# Patient Record
Sex: FEMALE | Race: WHITE | URBAN - METROPOLITAN AREA
[De-identification: names, ages, dates, MRNs, and addresses within clinical notes are randomized per-mention and may not be internally consistent; named-entity substitution may affect disease eponyms.]

---

## 2018-01-17 ENCOUNTER — OUTPATIENT (OUTPATIENT)
Dept: OUTPATIENT SERVICES | Facility: HOSPITAL | Age: 58
LOS: 1 days | Discharge: HOME | End: 2018-01-17

## 2018-01-17 DIAGNOSIS — Z12.31 ENCOUNTER FOR SCREENING MAMMOGRAM FOR MALIGNANT NEOPLASM OF BREAST: ICD-10-CM

## 2018-01-23 ENCOUNTER — OUTPATIENT (OUTPATIENT)
Dept: OUTPATIENT SERVICES | Facility: HOSPITAL | Age: 58
LOS: 1 days | Discharge: HOME | End: 2018-01-23

## 2018-01-23 DIAGNOSIS — R92.8 OTHER ABNORMAL AND INCONCLUSIVE FINDINGS ON DIAGNOSTIC IMAGING OF BREAST: ICD-10-CM

## 2019-01-22 ENCOUNTER — OUTPATIENT (OUTPATIENT)
Dept: OUTPATIENT SERVICES | Facility: HOSPITAL | Age: 59
LOS: 1 days | Discharge: HOME | End: 2019-01-22

## 2019-01-22 DIAGNOSIS — Z12.31 ENCOUNTER FOR SCREENING MAMMOGRAM FOR MALIGNANT NEOPLASM OF BREAST: ICD-10-CM

## 2019-01-23 DIAGNOSIS — Z13.820 ENCOUNTER FOR SCREENING FOR OSTEOPOROSIS: ICD-10-CM

## 2019-01-23 DIAGNOSIS — Z78.0 ASYMPTOMATIC MENOPAUSAL STATE: ICD-10-CM

## 2019-01-23 DIAGNOSIS — K51.90 ULCERATIVE COLITIS, UNSPECIFIED, WITHOUT COMPLICATIONS: ICD-10-CM

## 2019-01-23 DIAGNOSIS — M89.9 DISORDER OF BONE, UNSPECIFIED: ICD-10-CM

## 2020-01-24 ENCOUNTER — OUTPATIENT (OUTPATIENT)
Dept: OUTPATIENT SERVICES | Facility: HOSPITAL | Age: 60
LOS: 1 days | Discharge: HOME | End: 2020-01-24
Payer: MEDICARE

## 2020-01-24 DIAGNOSIS — Z12.31 ENCOUNTER FOR SCREENING MAMMOGRAM FOR MALIGNANT NEOPLASM OF BREAST: ICD-10-CM

## 2020-01-24 PROCEDURE — 77063 BREAST TOMOSYNTHESIS BI: CPT | Mod: 26

## 2020-01-24 PROCEDURE — 77067 SCR MAMMO BI INCL CAD: CPT | Mod: 26

## 2020-02-13 ENCOUNTER — OUTPATIENT (OUTPATIENT)
Dept: OUTPATIENT SERVICES | Facility: HOSPITAL | Age: 60
LOS: 1 days | Discharge: HOME | End: 2020-02-13
Payer: MEDICARE

## 2020-02-13 DIAGNOSIS — R92.8 OTHER ABNORMAL AND INCONCLUSIVE FINDINGS ON DIAGNOSTIC IMAGING OF BREAST: ICD-10-CM

## 2020-02-13 PROCEDURE — G0279: CPT | Mod: 26,RT

## 2020-02-13 PROCEDURE — 77065 DX MAMMO INCL CAD UNI: CPT | Mod: 26,RT

## 2020-02-13 PROCEDURE — 76642 ULTRASOUND BREAST LIMITED: CPT | Mod: 26,RT

## 2020-03-06 ENCOUNTER — OUTPATIENT (OUTPATIENT)
Dept: OUTPATIENT SERVICES | Facility: HOSPITAL | Age: 60
LOS: 1 days | Discharge: HOME | End: 2020-03-06
Payer: MEDICARE

## 2020-03-06 DIAGNOSIS — R92.8 OTHER ABNORMAL AND INCONCLUSIVE FINDINGS ON DIAGNOSTIC IMAGING OF BREAST: ICD-10-CM

## 2020-03-06 PROCEDURE — 76642 ULTRASOUND BREAST LIMITED: CPT | Mod: 26,RT

## 2020-04-20 PROBLEM — Z00.00 ENCOUNTER FOR PREVENTIVE HEALTH EXAMINATION: Status: ACTIVE | Noted: 2020-04-20

## 2020-09-18 ENCOUNTER — OUTPATIENT (OUTPATIENT)
Dept: OUTPATIENT SERVICES | Facility: HOSPITAL | Age: 60
LOS: 1 days | Discharge: HOME | End: 2020-09-18
Payer: MEDICARE

## 2020-09-18 DIAGNOSIS — R92.8 OTHER ABNORMAL AND INCONCLUSIVE FINDINGS ON DIAGNOSTIC IMAGING OF BREAST: ICD-10-CM

## 2020-09-18 PROCEDURE — 76642 ULTRASOUND BREAST LIMITED: CPT | Mod: 26,RT

## 2020-09-18 PROCEDURE — G0279: CPT | Mod: 26

## 2020-09-18 PROCEDURE — 77065 DX MAMMO INCL CAD UNI: CPT | Mod: 26,RT

## 2020-10-01 ENCOUNTER — RESULT REVIEW (OUTPATIENT)
Age: 60
End: 2020-10-01

## 2020-10-01 ENCOUNTER — OUTPATIENT (OUTPATIENT)
Dept: OUTPATIENT SERVICES | Facility: HOSPITAL | Age: 60
LOS: 1 days | Discharge: HOME | End: 2020-10-01
Payer: MEDICARE

## 2020-10-01 PROCEDURE — 19081 BX BREAST 1ST LESION STRTCTC: CPT | Mod: RT

## 2020-10-01 PROCEDURE — 88305 TISSUE EXAM BY PATHOLOGIST: CPT | Mod: 26

## 2020-10-02 LAB — SURGICAL PATHOLOGY STUDY: SIGNIFICANT CHANGE UP

## 2020-10-07 DIAGNOSIS — D24.1 BENIGN NEOPLASM OF RIGHT BREAST: ICD-10-CM

## 2020-10-07 DIAGNOSIS — R92.1 MAMMOGRAPHIC CALCIFICATION FOUND ON DIAGNOSTIC IMAGING OF BREAST: ICD-10-CM

## 2020-11-16 ENCOUNTER — APPOINTMENT (OUTPATIENT)
Dept: BREAST CENTER | Facility: CLINIC | Age: 60
End: 2020-11-16
Payer: MEDICARE

## 2020-11-16 VITALS
DIASTOLIC BLOOD PRESSURE: 82 MMHG | SYSTOLIC BLOOD PRESSURE: 124 MMHG | HEIGHT: 60 IN | WEIGHT: 162 LBS | TEMPERATURE: 97.3 F | BODY MASS INDEX: 31.8 KG/M2

## 2020-11-16 DIAGNOSIS — Z87.09 PERSONAL HISTORY OF OTHER DISEASES OF THE RESPIRATORY SYSTEM: ICD-10-CM

## 2020-11-16 PROCEDURE — 99072 ADDL SUPL MATRL&STAF TM PHE: CPT

## 2020-11-16 PROCEDURE — 99203 OFFICE O/P NEW LOW 30 MIN: CPT

## 2020-11-17 PROBLEM — Z87.09 HISTORY OF ASTHMA: Status: RESOLVED | Noted: 2020-11-17 | Resolved: 2020-11-17

## 2020-11-17 NOTE — HISTORY OF PRESENT ILLNESS
[FreeTextEntry1] : Imtiaz is a 60 F with a Right breast intraductal papilloma. \par \par Her work up was as follows: \par 2020 -- b/l screening mammogram \par -heterogenously dense breasts\par -calcifications in retroareolar R breast \par -no suspicious mass, microcalcifications or architectural distortion inL breast \par BIRADS 0 \par \par 2020 -- R dx mammogram \par -heterogenously dense breasts\par -oval circumscribed mass with calcifications in retroareolar R breast, measures 0.4 cm \par R US \par -@9N3, round circumscribed mass, measuring 3 x 2 x 2 mm\par BIRADS 4 \par \par 3/6/2020 -- repeat R dx mammogram and US \par -@9N3, fluctuating cyst, slightly decreased in size measuring 2 x 2 x 2 mm \par BIRADS 3 \par \par 2020 -- R dx mammogram and US \par -heterogenously dense breasts\par -change in calcifications in retroareolar R breast --> BIOPSY \par R US \par -@9N3, previously visualized mass is no longer seen \par BIRADS 4\par \par 10/1/2020 -- R stereotactic guided biopsy (tophat)\par -intraductal papilloma \par \par She has no breast related complaints at this time.  She denies any breast pain, has not palpated any new palpable masses in either breast and denies any nipple discharge or retraction.  \par \par HISTORICAL RISK FACTORS: \par -no prior breast biopsies or surgeries \par -no family history of breast or ovarian cancer \par -, age at first live birth was 28 \par -prior OCP use \par -no gyn surgeries\par

## 2020-11-17 NOTE — PAST MEDICAL HISTORY
[Menarche Age ____] : age at menarche was [unfilled] [Menopause Age____] : age at menopause was [unfilled] [Total Preg ___] : G[unfilled] [Live Births ___] : P[unfilled]  [Age At Live Birth ___] : Age at live birth: [unfilled] [History of Hormone Replacement Treatment] : has no history of hormone replacement treatment [FreeTextEntry5] : denies [FreeTextEntry6] : denies [FreeTextEntry7] : yes in past x 1 year  [FreeTextEntry8] : denies

## 2020-11-17 NOTE — PHYSICAL EXAM
[Normocephalic] : normocephalic [Atraumatic] : atraumatic [EOMI] : extra ocular movement intact [No Supraclavicular Adenopathy] : no supraclavicular adenopathy [No Cervical Adenopathy] : no cervical adenopathy [No dominant masses] : no dominant masses in right breast  [No dominant masses] : no dominant masses left breast [No Nipple Retraction] : no left nipple retraction [No Nipple Discharge] : no left nipple discharge [No Axillary Lymphadenopathy] : no left axillary lymphadenopathy [Soft] : abdomen soft [Not Tender] : non-tender [No Edema] : no edema [No Rashes] : no rashes [No Ulceration] : no ulceration [de-identified] : likely hematoma from her recent biopsy, otherwise no other suspicious abnormalities palpated  [de-identified] : no suspicious abnormalities palpated

## 2020-11-17 NOTE — DATA REVIEWED
[FreeTextEntry1] : EXAM: MG MAMMO SCREEN W FRANSISCO BI#\par \par \par PROCEDURE DATE: 01/24/2020\par \par \par \par INTERPRETATION: HISTORY:\par Bilateral MG MAMMO SCREEN W FRANSISCO BI# was performed. Patient is 59 years old\par and is seen for screening. The patient has no personal history of cancer.\par The patient has no family history of breast cancer.\par \par RISK ASSESSMENT:\par NCI Lifetime Risk: 7.6\par Tyrer-Cuzick Lifetime Risk: 11.6\par \par CLINICAL BREAST EXAM:\par The patient reports her last clinical breast exam was performed within the\par past month.\par \par COMPARISON STUDIES:\par The present examination has been compared to prior imaging studies performed\par at  on 01/17/2018, 01/23/2018 and\par 01/22/2019.\par \par MAMMOGRAM FINDINGS:\par Mammography was performed including the following views: bilateral\par craniocaudal with tomosynthesis, bilateral mediolateral oblique with\par tomosynthesis. The examination includes digital synthetic 2D and digital\par tomosynthesis 3D images. Additional imaging analysis was performed using CAD\par (computer-aided detection) software.\par \par The breasts are heterogeneously dense, which may obscure small masses.\par \par There are calcifications seen in the retroareolar of the right breast.\par \par No suspicious mass, grouping of calcifications, or other abnormality is\par identified in the left breast.\par \par IMPRESSION:\par Calcifications in the right breast require additional evaluation.\par \par RECOMMENDATION:\par Patient will be recalled for additional views.\par \par ASSESSMENT:\par BI-RADS Category 0: Incomplete: Needs Additional Imaging Evaluation\par \par The patient will be notified of these results by telephone, and will also be\par mailed a written summary in layman's terms.\par \par \par \par \par \par \par \par \par MICHAEL COOPER M.D., ATTENDING RADIOLOGIST\par This document has been electronically signed. Jan 25 2020 11:21AM\par \par EXAM: MG MAMMO DIAG W FRANSISCO RT#\par \par \par PROCEDURE DATE: 02/13/2020\par \par \par \par INTERPRETATION: Clinical History / Reason for exam: Mass with calcification\par in the retroareolar region of the right breast.\par \par The patient reports her last clinical breast examination was performed one\par month ago.\par \par Family history: No family history of breast cancer.\par \par Comparisons: Mammograms dating back to 2016.\par \par Views obtained: right full field digital 2D and digital tomosynthesis images.\par \par Computer-aided detection was utilized in the interpretation of this\par examination.\par \par Breast composition: The breasts are heterogeneously dense, which may obscure\par small masses.\par \par Findings:\par \par Mammogram:\par There is a an oval circumscribed mass with calcifications noted in the\par retroareolar region of the right breast measuring 0.4 cm.\par \par Ultrasound:\par \par Targeted unilateral right breast ultrasound was performed.\par \par At the area of mammographic concern in the retroareolar region of the right\par breast 9:00 N3 there is a round circumscribed mass with calcifications\par measuring 0.3 x 0.2 x 0.2 cm. Ultrasound-guided biopsy recommended.\par \par Impression:\par 1. A round circumscribed mass with calcifications in retroareolar region of\par the right breast 9:00. Ultrasound-guided biopsy recommended.\par \par Recommendation: Ultrasound guided biopsy.\par \par BI-RADS Category 4: Suspicious\par \par The above findings and recommendations were discussed with the patient at\par the time of the examination.\par \par The above findings and recommendations were discussed with the doctor's\par office ([place]) on 2/14/2020 at 12:49 PM by Dr. Cooper via telephone.\par \par \par \par \par \par \par MICHAEL COOPER M.D., ATTENDING RADIOLOGIST\par This document has been electronically signed. Feb 14 2020 3:45PM\par EXAM: US BREAST LIMITED RT\par \par \par PROCEDURE DATE: 03/06/2020\par \par \par \par INTERPRETATION: Clinical History / Reason for exam: Right breast mass,\par initially scheduled for ultrasound-guided biopsy.\par \par The patient reports her last clinical breast examination was performed\par uncertain.\par \par Family history: No personal history of breast cancer.\par \par Breast composition: The breasts are heterogeneously dense, which may obscure\par small masses.\par \par Comparisons: Breast ultrasound dating back to 2/13/2020.\par \par Findings:\par \par Ultrasound:\par \par Targeted unilateral right breast ultrasound was performed.\par \par The right breast mass initially scheduled to be biopsied has minimally\par reduced in size, measuring 2.0 x 2.0 x 2.0 cm likely on the basis of\par fluctuating cyst, for which patient elected Short interval follow-up in 6\par months.\par \par Impression:\par Minimal interval decrease in the size of oval circumscribed mass right\par breast likely fluctuating cyst, short interval follow-up with ultrasound\par recommended.\par \par Recommendation: Follow-up breast ultrasound in 6 months.\par \par BI-RADS category 3: Probably Benign\par \par \par \par \par The above findings and recommendations were discussed with the patient at\par the time of the examination.\par \par \par \par \par \par \par MICHAEL COOPER M.D., ATTENDING RADIOLOGIST\par This document has been electronically signed. Mar 9 2020 12:06PM\par \par EXAM: MG US BREAST LIMITED RT\par EXAM: MG MAMMO DIAG W FRANSISCO RT#\par \par \par PROCEDURE DATE: 09/18/2020\par \par \par \par INTERPRETATION: Clinical History / Reason for exam: Follow-up right breast mass .\par \par The patient reports her last clinical breast examination was performed about twelve months ago.\par \par Family history: There is no family history of breast cancer.\par \par Comparisons: Mammograms dating back 2018. Breast ultrasound dating back to 2020.\par \par Views obtained: right full field digital 2D and digital tomosynthesis images as well as spot views.\par \par Computer-aided detection was utilized in the interpretation of this examination.\par \par Breast composition: The breasts are heterogeneously dense, which may obscure small masses.\par \par Findings:\par \par Mammogram:\par \par There is morphological change in the calcifications noted in the retroareolar region of the right breast. Stereotactic biopsy recommended. No suspicious mass or architectural distortion seen in the right breast.\par \par Ultrasound:\par \par Targeted unilateral right breast ultrasound was performed.\par \par The prior noted oval circumscribed mass at 9:00 N3 could not visualized in current study likely on the basis of fluctuating cyst. However, there are grouped calcifications noted in the mammogram as described above for which stereotactic biopsy is recommended.\par \par Impression: Morphological change in the calcifications noted in the retroareolar region of the right breast. Stereotactic biopsy recommended.\par \par Recommendation: Stereotactic guided biopsy.\par \par BI-RADS Category 4: Suspicious\par \par The above findings and recommendations were discussed with the patient on 9/21/2020 at 9:[address] Dr[address] Cooper via telephone with read back.\par \par The above findings and recommendations were discussed with Dr. Coates on 9/21/2020 at 11:40 AM by Dr. Cooper via telephone\par \par \par \par \par \par MICHAEL COOPER M.D., ATTENDING RADIOLOGIST\par This document has been electronically signed. Sep 22 2020 10:59AM\par \par EXAM: MG STEREO BX 1ST RT SISC\par \par *** ADDENDUM 10/02/2020 ***\par \par FINAL DIAGNOSIS\par BREAST, RIGHT RETROAREOLAR CALCIFICATIONS, STEREOTACTIC GUIDED\par VACUUM ASSISTED NEEDLE CORE BIOPSIES:\par - PARTIALLY SCLEROSING INTRADUCTAL PAPILLOMA ASSOCIATED WITH\par STROMAL CALCIFICATION.\par - RADIOGRAPHIC/PATHOLOGIC CORRELATION WITH THE SPECIMEN'S\par DIGITAL IMAGE VIEWED ON PACS IS PERFORMED.\par \par VERIFIED BY: JIMMIE CAMPOS M.D.\par \par There is radiologic pathologic correlation. Surgical management is suggested.\par \par The patient is notified of the result and recommendations on 10/2/2020 at 4:50 PM. She is instructed to follow up with her physician and see a breast surgeon.\par \par \par Dr Nile Coates was also notified of the results and recommendations on 10/5/2020 at 9:30 AM.\par \par *** END OF ADDENDUM 10/02/2020 ***\par \par \par \par PROCEDURE DATE: 10/01/2020\par \par \par \par INTERPRETATION: Clinical History / Reason for exam: Patient for right breast stereotactic guided biopsy, suggested on 9/18/2020.\par \par Previous films and reports were reviewed. The procedure, its risks, benefits, and alternatives were explained to the patient, who expressed understanding and gave informed written and verbal consent. A time-out, which included the patient's full name, date of birth, description of the expected procedure and procedure site, was performed immediately before the procedure.\par \par A lateral approach was selected for the procedure. Using the usual sterile technique, 1% lidocaine as local anesthetic and deeper anesthetic containing epinephrine and stereotactic guidance, the previously described calcifications in the right retroareolar region were biopsied using a 9 gauge vacuum-assisted device. A single pass was made and 4 samples were collected. Specimen radiography demonstrated the calcifications to be contained within the specimen, confirming accurate targeting. A (cartmi top-hat) localizing clip was then placed into the biopsy cavity. Hemostasis was obtained and a sterile dressing was applied. The specimens were sorted. Those containing calcifications were placed in a separate container. All were submitted for pathology.\par \par A unilateral right mammogram performed in the CC and lateral projections. The top hat metallic marker is displaced inferior medial to the biopsy site, however, residual microcalcifications are present which are less in number compared to the recent prebiopsy study.\par \par There were no immediate complications. The patient tolerated the procedure well and left the department in good condition. Written discharge instructions were discussed and provided to the patient.\par \par IMPRESSION:\par \par Successful stereotactic guided biopsy of the right breast retroareolar calcifications. Some residual microcalcifications are present at the biopsy site. The metallic marker is displaced inferior medial to the expected location.\par \par \par Pathology: Pending, to be reported as an addendum.\par ***Please see the addendum at the top of this report. It may contain additional important information or changes.****\par \par \par \par \par HANNAH FERNANDEZ M.D., ATTENDING RADIOLOGIST\par This document has been electronically signed. Oct 1 2020 2:54PM\par Addend: HANNAH FERNANDEZ M.D., ATTENDING RADIOLOGIST\julissa This addendum was electronically signed on: Oct 5 2020 10:59AM.\par

## 2020-11-17 NOTE — ASSESSMENT
[FreeTextEntry1] : Imtiaz is a 60 F with a screen detected right breast intraductal papilloma. \par \par ON exam, I was not able to palpate any suspicious abnormalities within either breast.  She did have a likely hematoma from her recent biopsy in her right retroareolar area. \par \par Her most recent imaging was a R dx mammogram and US on 9/18/2020 which revealed a change in her previously visualized calcifications in her retroareolar area, which was her biopsy proven intraductal papilloma.  Her previously visualized mass @9N3 is no longer seen. \par \par Intraductal papillomas without atypia are considered fibroproliferative lesions without atypia.  Patients with these lesions were found to have a slightly increased relative risk of breast cancer compared to the reference population.  However the lesions themselves do not have any malignant potential. \par \par Although newer studies regarding the upgrade rate (to cancer) ranges from 0-14% on surgical excision, with pathologic/radiologic concordance, older studies found a higher upgrade rate.  I have recommended surgical excision for this reason.  Because it is not readily palpable, I will have her undergo a preoperative radiofrequency tag localization.  \par \par The risks and benefits of the procedure were explained to the patient, including bleeding, infection, seroma/hematoma formation, and possible re-operation if the surgical excision yields an upgrade to cancer with positive margins. Informed consent was obtained today.\par \par We discussed dense breasts.  Increasing breast density has been found to increase ones risk of breast cancer, but at this time, there is no clear indication for additional imaging in this setting, as both US and MRI have not been found to improve survival.  One can consider bilateral screening US.  However, out of 1000 women screened, the use of routine US will only identify an additional 3-5 cancers.  The use of US was found to increase the likelihood of undergoing more imaging and more biopsies.  She does have dense breasts.  We have decided to proceed with screening bilateral breast US at this time.  This will be scheduled with her next screening mammogram.\par \par She is otherwise at an average risk for breast cancer and should continue with annual screening mammograms/US, next screening mammogram will be due on 1/24/2021. \par \par All of her questions were answered.  She knows to call with any further questions or concerns. \par \par PLAN: \par -OR: RIGHT BREAST WIDE LOCAL EXCISION WITH RF LOCALIZATION \par -DIAGNOSIS: RIGHT BREAST INTRADUCTAL PAPILLOMA \par -if surgery is scheduled for after 1/24/2021, she will need a b/l dx mammogram on 1/24/2021, prior to her surgery\par -f/up after

## 2020-11-17 NOTE — REVIEW OF SYSTEMS
[As Noted in HPI] : as noted in HPI [Negative] : Heme/Lymph [Fever] : no fever [Chills] : no chills [Abn Vaginal Bleeding] : no unexplained vaginal bleeding [Skin Lesions] : no skin lesions [Skin Wound] : no skin wound [Breast Pain] : no breast pain [Breast Lump] : no breast lump [Hot Flashes] : no hot flashes

## 2020-11-23 ENCOUNTER — NON-APPOINTMENT (OUTPATIENT)
Age: 60
End: 2020-11-23

## 2021-01-04 ENCOUNTER — APPOINTMENT (OUTPATIENT)
Dept: NEUROLOGY | Facility: CLINIC | Age: 61
End: 2021-01-04
Payer: MEDICARE

## 2021-01-04 VITALS
OXYGEN SATURATION: 97 % | DIASTOLIC BLOOD PRESSURE: 81 MMHG | HEART RATE: 88 BPM | SYSTOLIC BLOOD PRESSURE: 127 MMHG | WEIGHT: 163 LBS | HEIGHT: 60 IN | BODY MASS INDEX: 32 KG/M2 | TEMPERATURE: 97.4 F

## 2021-01-04 DIAGNOSIS — G89.29 LOW BACK PAIN: ICD-10-CM

## 2021-01-04 DIAGNOSIS — E11.9 TYPE 2 DIABETES MELLITUS W/OUT COMPLICATIONS: ICD-10-CM

## 2021-01-04 DIAGNOSIS — E78.5 HYPERLIPIDEMIA, UNSPECIFIED: ICD-10-CM

## 2021-01-04 DIAGNOSIS — R06.89 OTHER ABNORMALITIES OF BREATHING: ICD-10-CM

## 2021-01-04 DIAGNOSIS — K51.90 ULCERATIVE COLITIS, UNSPECIFIED, W/OUT COMPLICATIONS: ICD-10-CM

## 2021-01-04 DIAGNOSIS — M54.5 LOW BACK PAIN: ICD-10-CM

## 2021-01-04 DIAGNOSIS — R20.2 PARESTHESIA OF SKIN: ICD-10-CM

## 2021-01-04 PROCEDURE — 99203 OFFICE O/P NEW LOW 30 MIN: CPT

## 2021-01-04 PROCEDURE — 99072 ADDL SUPL MATRL&STAF TM PHE: CPT

## 2021-01-04 RX ORDER — ZOLPIDEM TARTRATE 10 MG/1
10 TABLET ORAL
Qty: 30 | Refills: 0 | Status: ACTIVE | COMMUNITY
Start: 2020-11-05

## 2021-01-04 RX ORDER — METFORMIN ER 500 MG 500 MG/1
500 TABLET ORAL TWICE DAILY
Refills: 0 | Status: ACTIVE | COMMUNITY
Start: 2020-11-03

## 2021-01-04 RX ORDER — LOSARTAN POTASSIUM 25 MG/1
25 TABLET, FILM COATED ORAL
Qty: 90 | Refills: 0 | Status: ACTIVE | COMMUNITY
Start: 2020-11-21

## 2021-01-04 RX ORDER — LEVOTHYROXINE SODIUM 0.05 MG/1
50 TABLET ORAL
Qty: 90 | Refills: 0 | Status: ACTIVE | COMMUNITY
Start: 2020-10-20

## 2021-01-04 RX ORDER — FAMOTIDINE 40 MG/1
40 TABLET, FILM COATED ORAL
Qty: 180 | Refills: 0 | Status: ACTIVE | COMMUNITY
Start: 2020-12-09

## 2021-01-04 RX ORDER — GABAPENTIN 300 MG/1
300 CAPSULE ORAL
Qty: 30 | Refills: 0 | Status: COMPLETED | COMMUNITY
Start: 2020-05-12

## 2021-01-04 RX ORDER — ATORVASTATIN CALCIUM 40 MG/1
40 TABLET, FILM COATED ORAL
Qty: 90 | Refills: 0 | Status: ACTIVE | COMMUNITY
Start: 2020-11-04

## 2021-01-04 NOTE — ASSESSMENT
[FreeTextEntry1] : 1.  Patient may be developing very mild polyneuropathy though w/o significant findings on exam. She should continue tight regulation of blood sugar.  \par 2.  LBP chronic but improving.\par \par Plan:\par 1.  Outpatient physical therapy.  I also provided patient with handouts for LBP.\par 2.  Continue tight regulation of blood sugars an dtreatment of hypothyroidism.\par 3.  Option for alpha-lipoic acid if paresthesias worsen.\par 4.  Consider MRI of Lumbar spine if LBP worsens.

## 2021-01-04 NOTE — HISTORY OF PRESENT ILLNESS
[FreeTextEntry1] : Pt is 61 yo RH female here today for 2 issues:  First in April went to reach for something with right hand and experienced excrutiating pain in lower back when bent only slightly to get something off of table.  Called PCP 2 days later saying pain was unbearable and developed at that time tingling down both thighs side and front pins and needles and down legs into feet.  Mostly on sides of LE's.  Told it sounds like a pinched nerve and prescribed gabapentin 300 mg qhs.  She took that until August.  States she told PCP she felt good and came off it.  After she stopped it she still feels the tingling in LE's.  No b/b issues.  Feels a tiny bit of pain in lower back, tiny hint of pain in lower back.  Initially wasn't able to walk, sit or stand.  Took about 2-4 weeks to settle down.  She took gabapentin for 4 months.  States her balance not affected and symptoms don't keep her awake at night.  No trouble getting out of chair or climbing stairs.\par \par Second concern is left hand shakes, started in between all of this described above.  Her metformin was increased.  Has had DM, steroid induced.  She was hospitalized for trouble breathing.  States she has in 9-11 and in hospital for 10 days. 10 1/2 years ago.  \par No family history of neurologic problems.\par States doesn't know if shakey hand if a result of her back or from metformin being increased or something else. EMS/Mother

## 2021-01-04 NOTE — REVIEW OF SYSTEMS
[Numbness] : numbness [Tingling] : tingling [Sleep Disturbances] : sleep disturbances [Loss Of Hearing] : hearing loss [Wheezing] : wheezing [Heartburn] : heartburn [Hot Flashes] : hot flashes [Fever] : no fever [Chills] : no chills [Feeling Poorly] : not feeling poorly [Feeling Tired] : not feeling tired [Recent Weight Gain (___ Lbs)] : no recent weight gain [Recent Weight Loss (___ Lbs)] : no recent weight loss [Confused or Disoriented] : no confusion [Memory Lapses or Loss] : no memory loss [Decr. Concentrating Ability] : no decrease in concentrating ability [Facial Weakness] : no facial weakness [Arm Weakness] : no arm weakness [Hand Weakness] : no hand weakness [Leg Weakness] : no leg weakness [Poor Coordination] : good coordination [Difficulty Writing] : no difficulty writing [Difficulties in Speech] : no speech difficulties [Seizures] : no convulsions [Dizziness] : no dizziness [Fainting] : no fainting [Lightheadedness] : no lightheadedness [Vertigo] : no vertigo [Cluster Headache] : no cluster headache [Migraine Headache] : no migraine headache [Tension Headache] : no tension-type headache [Difficulty Walking] : no difficulty walking [Inability to Walk] : able to walk [Ataxia] : no ataxia [Frequent Falls] : not falling [Suicidal] : not suicidal [Anxiety] : no anxiety [Depression] : no depression [Eye Pain] : no eye pain [Eyesight Problems] : no eyesight problems [Earache] : no earache [Heart Rate Is Slow] : the heart rate was not slow [Heart Rate Is Fast] : the heart rate was not fast [Chest Pain] : no chest pain [Palpitations] : no palpitations [Shortness Of Breath] : no shortness of breath [Cough] : no cough [Abdominal Pain] : no abdominal pain [Vomiting] : no vomiting [Constipation] : no constipation [Diarrhea] : no diarrhea [Melena] : no melena [Dysuria] : no dysuria [Incontinence] : no incontinence [Joint Pain] : no joint pain [Joint Swelling] : no joint swelling [Joint Stiffness] : no joint stiffness [Skin Lesions] : no skin lesions [Skin Wound] : no skin wound [Muscle Weakness] : no muscle weakness [Easy Bleeding] : no tendency for easy bleeding [Easy Bruising] : no tendency for easy bruising [de-identified] : once in a while sleeping trouble [FreeTextEntry3] : tiny cataract in rig eye [FreeTextEntry4] : left ear, 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

## 2021-01-05 ENCOUNTER — OUTPATIENT (OUTPATIENT)
Dept: OUTPATIENT SERVICES | Facility: HOSPITAL | Age: 61
LOS: 1 days | Discharge: HOME | End: 2021-01-05
Payer: MEDICARE

## 2021-01-05 VITALS
OXYGEN SATURATION: 99 % | RESPIRATION RATE: 13 BRPM | HEART RATE: 82 BPM | HEIGHT: 60 IN | SYSTOLIC BLOOD PRESSURE: 142 MMHG | DIASTOLIC BLOOD PRESSURE: 74 MMHG | WEIGHT: 165.35 LBS | TEMPERATURE: 97 F

## 2021-01-05 DIAGNOSIS — Z98.890 OTHER SPECIFIED POSTPROCEDURAL STATES: Chronic | ICD-10-CM

## 2021-01-05 DIAGNOSIS — D24.1 BENIGN NEOPLASM OF RIGHT BREAST: ICD-10-CM

## 2021-01-05 DIAGNOSIS — Z01.818 ENCOUNTER FOR OTHER PREPROCEDURAL EXAMINATION: ICD-10-CM

## 2021-01-05 DIAGNOSIS — Z90.49 ACQUIRED ABSENCE OF OTHER SPECIFIED PARTS OF DIGESTIVE TRACT: Chronic | ICD-10-CM

## 2021-01-05 DIAGNOSIS — Z90.89 ACQUIRED ABSENCE OF OTHER ORGANS: Chronic | ICD-10-CM

## 2021-01-05 LAB
A1C WITH ESTIMATED AVERAGE GLUCOSE RESULT: 7.2 % — HIGH (ref 4–5.6)
ALBUMIN SERPL ELPH-MCNC: 4.7 G/DL — SIGNIFICANT CHANGE UP (ref 3.5–5.2)
ALP SERPL-CCNC: 69 U/L — SIGNIFICANT CHANGE UP (ref 30–115)
ALT FLD-CCNC: 20 U/L — SIGNIFICANT CHANGE UP (ref 0–41)
ANION GAP SERPL CALC-SCNC: 13 MMOL/L — SIGNIFICANT CHANGE UP (ref 7–14)
APTT BLD: 28.3 SEC — SIGNIFICANT CHANGE UP (ref 27–39.2)
AST SERPL-CCNC: 18 U/L — SIGNIFICANT CHANGE UP (ref 0–41)
BASOPHILS # BLD AUTO: 0.07 K/UL — SIGNIFICANT CHANGE UP (ref 0–0.2)
BASOPHILS NFR BLD AUTO: 0.9 % — SIGNIFICANT CHANGE UP (ref 0–1)
BILIRUB SERPL-MCNC: <0.2 MG/DL — SIGNIFICANT CHANGE UP (ref 0.2–1.2)
BUN SERPL-MCNC: 21 MG/DL — HIGH (ref 10–20)
CALCIUM SERPL-MCNC: 9.5 MG/DL — SIGNIFICANT CHANGE UP (ref 8.5–10.1)
CHLORIDE SERPL-SCNC: 101 MMOL/L — SIGNIFICANT CHANGE UP (ref 98–110)
CO2 SERPL-SCNC: 27 MMOL/L — SIGNIFICANT CHANGE UP (ref 17–32)
CREAT SERPL-MCNC: 0.7 MG/DL — SIGNIFICANT CHANGE UP (ref 0.7–1.5)
EOSINOPHIL # BLD AUTO: 0.2 K/UL — SIGNIFICANT CHANGE UP (ref 0–0.7)
EOSINOPHIL NFR BLD AUTO: 2.6 % — SIGNIFICANT CHANGE UP (ref 0–8)
ESTIMATED AVERAGE GLUCOSE: 160 MG/DL — HIGH (ref 68–114)
GLUCOSE SERPL-MCNC: 125 MG/DL — HIGH (ref 70–99)
HCT VFR BLD CALC: 38.1 % — SIGNIFICANT CHANGE UP (ref 37–47)
HGB BLD-MCNC: 12.1 G/DL — SIGNIFICANT CHANGE UP (ref 12–16)
IMM GRANULOCYTES NFR BLD AUTO: 0.1 % — SIGNIFICANT CHANGE UP (ref 0.1–0.3)
INR BLD: 1.03 RATIO — SIGNIFICANT CHANGE UP (ref 0.65–1.3)
LYMPHOCYTES # BLD AUTO: 2.61 K/UL — SIGNIFICANT CHANGE UP (ref 1.2–3.4)
LYMPHOCYTES # BLD AUTO: 34 % — SIGNIFICANT CHANGE UP (ref 20.5–51.1)
MCHC RBC-ENTMCNC: 28.1 PG — SIGNIFICANT CHANGE UP (ref 27–31)
MCHC RBC-ENTMCNC: 31.8 G/DL — LOW (ref 32–37)
MCV RBC AUTO: 88.6 FL — SIGNIFICANT CHANGE UP (ref 81–99)
MONOCYTES # BLD AUTO: 0.53 K/UL — SIGNIFICANT CHANGE UP (ref 0.1–0.6)
MONOCYTES NFR BLD AUTO: 6.9 % — SIGNIFICANT CHANGE UP (ref 1.7–9.3)
NEUTROPHILS # BLD AUTO: 4.25 K/UL — SIGNIFICANT CHANGE UP (ref 1.4–6.5)
NEUTROPHILS NFR BLD AUTO: 55.5 % — SIGNIFICANT CHANGE UP (ref 42.2–75.2)
NRBC # BLD: 0 /100 WBCS — SIGNIFICANT CHANGE UP (ref 0–0)
PLATELET # BLD AUTO: 332 K/UL — SIGNIFICANT CHANGE UP (ref 130–400)
POTASSIUM SERPL-MCNC: 4.2 MMOL/L — SIGNIFICANT CHANGE UP (ref 3.5–5)
POTASSIUM SERPL-SCNC: 4.2 MMOL/L — SIGNIFICANT CHANGE UP (ref 3.5–5)
PROT SERPL-MCNC: 7.3 G/DL — SIGNIFICANT CHANGE UP (ref 6–8)
PROTHROM AB SERPL-ACNC: 11.8 SEC — SIGNIFICANT CHANGE UP (ref 9.95–12.87)
RBC # BLD: 4.3 M/UL — SIGNIFICANT CHANGE UP (ref 4.2–5.4)
RBC # FLD: 13.2 % — SIGNIFICANT CHANGE UP (ref 11.5–14.5)
SODIUM SERPL-SCNC: 141 MMOL/L — SIGNIFICANT CHANGE UP (ref 135–146)
WBC # BLD: 7.67 K/UL — SIGNIFICANT CHANGE UP (ref 4.8–10.8)
WBC # FLD AUTO: 7.67 K/UL — SIGNIFICANT CHANGE UP (ref 4.8–10.8)

## 2021-01-05 PROCEDURE — 93010 ELECTROCARDIOGRAM REPORT: CPT

## 2021-01-05 NOTE — H&P PST ADULT - HISTORY OF PRESENT ILLNESS
60y Female presents today for presurgical testing for right breast wide local excision with radiofrequency localizer. Patient states that an abnormal finding was discovered during a routine mammogram. A subsequent mammogram in 9/2020 confirmed the abnormal finding. She denies any nipple discharge, pain, or change in appearance in breast.  Patient denies any CP, palpitations, SOB, cough, or dysuria. No recent URI or UTI.  Stated exercise tolerance is FOS 2           NEY screen reviewed    Patient denies any recent personal exposure to COVID19. Denies any sick contacts. Patient denies any travel within the past 30 days. Patient was instructed to quarantine until after procedure    Encounter for other preprocedural examination  Benign neoplasm of right breast  ^D24.1, 47678                                                                  1/22/21 AMB [facility]    Anesthesia Alert  NO--Difficult Airway  NO--History of neck surgery or radiation  NO--Limited ROM of neck  NO--History of Malignant hyperthermia  NO--No personal or family history of Pseudocholinesterase deficiency.  NO--Prior Anesthesia Complication  NO--Latex Allergy  NO--Loose teeth  NO--History of Rheumatoid Arthritis  NO--NEY  NO--Other_____    Patient states that this is their complete medical history and list of medications

## 2021-01-05 NOTE — H&P PST ADULT - NSICDXPASTSURGICALHX_GEN_ALL_CORE_FT
PAST SURGICAL HISTORY:  H/O dilation and curettage     History of appendectomy     History of tonsillectomy

## 2021-01-05 NOTE — H&P PST ADULT - NSANTHOSAYNRD_GEN_A_CORE
No. NEY screening performed.  STOP BANG Legend: 0-2 = LOW Risk; 3-4 = INTERMEDIATE Risk; 5-8 = HIGH Risk

## 2021-01-05 NOTE — H&P PST ADULT - ABILITY TO HEAR (WITH HEARING AID OR HEARING APPLIANCE IF NORMALLY USED):
Deaf Left ear/Mildly to Moderately Impaired: difficulty hearing in some environments or speaker may need to increase volume or speak distinctly

## 2021-01-14 PROBLEM — K21.9 GASTRO-ESOPHAGEAL REFLUX DISEASE WITHOUT ESOPHAGITIS: Chronic | Status: ACTIVE | Noted: 2021-01-05

## 2021-01-14 PROBLEM — K51.90 ULCERATIVE COLITIS, UNSPECIFIED, WITHOUT COMPLICATIONS: Chronic | Status: ACTIVE | Noted: 2021-01-05

## 2021-01-14 PROBLEM — E11.9 TYPE 2 DIABETES MELLITUS WITHOUT COMPLICATIONS: Chronic | Status: ACTIVE | Noted: 2021-01-05

## 2021-01-14 PROBLEM — E78.00 PURE HYPERCHOLESTEROLEMIA, UNSPECIFIED: Chronic | Status: ACTIVE | Noted: 2021-01-05

## 2021-01-14 PROBLEM — E03.9 HYPOTHYROIDISM, UNSPECIFIED: Chronic | Status: ACTIVE | Noted: 2021-01-05

## 2021-01-19 ENCOUNTER — OUTPATIENT (OUTPATIENT)
Dept: OUTPATIENT SERVICES | Facility: HOSPITAL | Age: 61
LOS: 1 days | Discharge: HOME | End: 2021-01-19

## 2021-01-19 ENCOUNTER — OUTPATIENT (OUTPATIENT)
Dept: OUTPATIENT SERVICES | Facility: HOSPITAL | Age: 61
LOS: 1 days | Discharge: HOME | End: 2021-01-19
Payer: MEDICARE

## 2021-01-19 ENCOUNTER — LABORATORY RESULT (OUTPATIENT)
Age: 61
End: 2021-01-19

## 2021-01-19 ENCOUNTER — RESULT REVIEW (OUTPATIENT)
Age: 61
End: 2021-01-19

## 2021-01-19 DIAGNOSIS — Z98.890 OTHER SPECIFIED POSTPROCEDURAL STATES: Chronic | ICD-10-CM

## 2021-01-19 DIAGNOSIS — Z90.89 ACQUIRED ABSENCE OF OTHER ORGANS: Chronic | ICD-10-CM

## 2021-01-19 DIAGNOSIS — Z90.49 ACQUIRED ABSENCE OF OTHER SPECIFIED PARTS OF DIGESTIVE TRACT: Chronic | ICD-10-CM

## 2021-01-19 DIAGNOSIS — Z11.59 ENCOUNTER FOR SCREENING FOR OTHER VIRAL DISEASES: ICD-10-CM

## 2021-01-19 PROCEDURE — 19281 PERQ DEVICE BREAST 1ST IMAG: CPT | Mod: RT

## 2021-01-22 ENCOUNTER — APPOINTMENT (OUTPATIENT)
Dept: BREAST CENTER | Facility: AMBULATORY SURGERY CENTER | Age: 61
End: 2021-01-22
Payer: MEDICARE

## 2021-01-22 ENCOUNTER — RESULT REVIEW (OUTPATIENT)
Age: 61
End: 2021-01-22

## 2021-01-22 ENCOUNTER — OUTPATIENT (OUTPATIENT)
Dept: OUTPATIENT SERVICES | Facility: HOSPITAL | Age: 61
LOS: 1 days | Discharge: HOME | End: 2021-01-22
Payer: MEDICARE

## 2021-01-22 VITALS
RESPIRATION RATE: 14 BRPM | DIASTOLIC BLOOD PRESSURE: 73 MMHG | OXYGEN SATURATION: 96 % | SYSTOLIC BLOOD PRESSURE: 127 MMHG | HEART RATE: 89 BPM

## 2021-01-22 VITALS
TEMPERATURE: 99 F | HEIGHT: 60 IN | DIASTOLIC BLOOD PRESSURE: 80 MMHG | WEIGHT: 165.35 LBS | HEART RATE: 89 BPM | SYSTOLIC BLOOD PRESSURE: 144 MMHG | RESPIRATION RATE: 18 BRPM | OXYGEN SATURATION: 99 %

## 2021-01-22 DIAGNOSIS — Z90.89 ACQUIRED ABSENCE OF OTHER ORGANS: Chronic | ICD-10-CM

## 2021-01-22 DIAGNOSIS — Z98.890 OTHER SPECIFIED POSTPROCEDURAL STATES: Chronic | ICD-10-CM

## 2021-01-22 DIAGNOSIS — Z90.49 ACQUIRED ABSENCE OF OTHER SPECIFIED PARTS OF DIGESTIVE TRACT: Chronic | ICD-10-CM

## 2021-01-22 PROCEDURE — 19125 EXCISION BREAST LESION: CPT | Mod: RT

## 2021-01-22 PROCEDURE — 88341 IMHCHEM/IMCYTCHM EA ADD ANTB: CPT | Mod: 26

## 2021-01-22 PROCEDURE — 88305 TISSUE EXAM BY PATHOLOGIST: CPT | Mod: 26

## 2021-01-22 PROCEDURE — 88342 IMHCHEM/IMCYTCHM 1ST ANTB: CPT | Mod: 26

## 2021-01-22 RX ORDER — HYDROMORPHONE HYDROCHLORIDE 2 MG/ML
0.5 INJECTION INTRAMUSCULAR; INTRAVENOUS; SUBCUTANEOUS
Refills: 0 | Status: DISCONTINUED | OUTPATIENT
Start: 2021-01-22 | End: 2021-01-22

## 2021-01-22 RX ORDER — OXYCODONE AND ACETAMINOPHEN 5; 325 MG/1; MG/1
1 TABLET ORAL EVERY 4 HOURS
Refills: 0 | Status: DISCONTINUED | OUTPATIENT
Start: 2021-01-22 | End: 2021-01-22

## 2021-01-22 RX ORDER — SODIUM CHLORIDE 9 MG/ML
1000 INJECTION, SOLUTION INTRAVENOUS
Refills: 0 | Status: DISCONTINUED | OUTPATIENT
Start: 2021-01-22 | End: 2021-02-05

## 2021-01-22 RX ORDER — IBUPROFEN 200 MG
1 TABLET ORAL
Qty: 21 | Refills: 0
Start: 2021-01-22 | End: 2021-01-28

## 2021-01-22 RX ORDER — ONDANSETRON 8 MG/1
4 TABLET, FILM COATED ORAL ONCE
Refills: 0 | Status: DISCONTINUED | OUTPATIENT
Start: 2021-01-22 | End: 2021-02-05

## 2021-01-22 RX ADMIN — SODIUM CHLORIDE 100 MILLILITER(S): 9 INJECTION, SOLUTION INTRAVENOUS at 11:46

## 2021-01-22 NOTE — ASU DISCHARGE PLAN (ADULT/PEDIATRIC) - CALL YOUR DOCTOR IF YOU HAVE ANY OF THE FOLLOWING:
Problem: Wound:  Goal: Will show signs of wound healing; wound closure and no evidence of infection  Description: Will show signs of wound healing; wound closure and no evidence of infection  Outcome: Ongoing Bleeding that does not stop/Wound/Surgical Site with redness, or foul smelling discharge or pus/Numbness, tingling, color or temperature change to extremity

## 2021-01-22 NOTE — ASU DISCHARGE PLAN (ADULT/PEDIATRIC) - ASU DC SPECIAL INSTRUCTIONSFT
Wide local excision     What to expect    1. You will have dermabond covering your incision(s). This is skin glue and creates an artificial scar over the wound. You may have 1-2 incisions depending on the location of your tumor.    2. A small amount of bruising is normal after surgery.      Wound Care    1. You may shower after 24-48 hrs after surgery.    2. When showering, do not try to scrub the incisions. Do not soak the incision (baths/swimming/hot tubs) for 2 weeks.    3. The dermabond will start to come off after about 2 weeks. Do not try to pick off the edges that have loosened.    4. You should wear a sports bra or more supportive bra day and night for 1-2 weeks after surgery. This will help with your post-operative pain.    Restrictions    1. It is always beneficial to ambulate regularly after surgery    2. Avoid heavy lifting and excessive use of the affected arm (such as weight lifting) for the first two weeks    3. Avoid bras/clothing that directly put pressure on the incision    4. Most home medications can be restarted the day after surgery (24 hours post-op). Specific instructions for blood thinners/aspirin will be given to patient per the medication reconciliation form      Pain control    1. You will be given a prescription for ibuprofen 600 mg, 1 tablet every 8 hrs as needed for pain. If you still require more medications, you can alternate between Tylenol and Motrin.    2. Please take the pain medication with food to decrease GI upset    3. If requiring stronger medications, please call the office at [phone] to speak with a practitioner as this may be a sign of prolonged healing/seroma/hematoma/infection/necrosis    Diet    No Restrictions    When to call us:    1. if you develop fevers or chills    2. if the incisions become red, tender and warm to touch    3. if you notice purulent drainage from either incision    4. if you notice that your chest wall is becoming "puffy" or filled with fluid after surgery    Follow up care    1. follow up appointment is usually 1-2 weeks after surgery    2. You will receive final pathology and further instructions during the first follow up visit.

## 2021-01-22 NOTE — CHART NOTE - NSCHARTNOTEFT_GEN_A_CORE
PACU ANESTHESIA ADMISSION NOTE      Procedure: Right breast lumpectomy      Post op diagnosis:  Intraductal papilloma of right breast        ____  Intubated  TV:______       Rate: ______      FiO2: ______    __x__  Patent Airway    __x__  Full return of protective reflexes    __x__  Full recovery from anesthesia / back to baseline     Vitals:   See Anesthesia record  T- 97.5 P- 81 R- 19 B/P- 124/65 SpO2- 95% on RA, 99% on 2L NC    Mental Status:  __x__ Awake   _____ Alert   _____ Drowsy   _____ Sedated    Nausea/Vomiting:  __x__ NO  ______Yes,   See Post - Op Orders          Pain Scale (0-10):  ___0__    Treatment: ____ None    ____ See Post - Op/PCA Orders    Post - Operative Fluids:   ____ Oral   __x__ See Post - Op Orders    Plan: Discharge:   __x__Home       _____Floor     _____Critical Care    _____  Other:_________________    Comments: No anesthesia complications/issues noted. Discharge to home when criteria met.

## 2021-01-22 NOTE — ASU DISCHARGE PLAN (ADULT/PEDIATRIC) - CARE PROVIDER_API CALL
Irina Rodriguez (MD)  Surgery  [address] [facility], 2nd [place] [address]  Phone: [phone]  Fax: [phone]  Follow Up Time:

## 2021-01-28 ENCOUNTER — NON-APPOINTMENT (OUTPATIENT)
Age: 61
End: 2021-01-28

## 2021-01-28 LAB — SURGICAL PATHOLOGY STUDY: SIGNIFICANT CHANGE UP

## 2021-02-01 DIAGNOSIS — E03.9 HYPOTHYROIDISM, UNSPECIFIED: ICD-10-CM

## 2021-02-01 DIAGNOSIS — R92.0 MAMMOGRAPHIC MICROCALCIFICATION FOUND ON DIAGNOSTIC IMAGING OF BREAST: ICD-10-CM

## 2021-02-01 DIAGNOSIS — K21.9 GASTRO-ESOPHAGEAL REFLUX DISEASE WITHOUT ESOPHAGITIS: ICD-10-CM

## 2021-02-01 DIAGNOSIS — Z79.82 LONG TERM (CURRENT) USE OF ASPIRIN: ICD-10-CM

## 2021-02-01 DIAGNOSIS — Z79.84 LONG TERM (CURRENT) USE OF ORAL HYPOGLYCEMIC DRUGS: ICD-10-CM

## 2021-02-01 DIAGNOSIS — Z17.0 ESTROGEN RECEPTOR POSITIVE STATUS [ER+]: ICD-10-CM

## 2021-02-01 DIAGNOSIS — N60.91 UNSPECIFIED BENIGN MAMMARY DYSPLASIA OF RIGHT BREAST: ICD-10-CM

## 2021-02-01 DIAGNOSIS — D24.1 BENIGN NEOPLASM OF RIGHT BREAST: ICD-10-CM

## 2021-02-01 DIAGNOSIS — N63.10 UNSPECIFIED LUMP IN THE RIGHT BREAST, UNSPECIFIED QUADRANT: ICD-10-CM

## 2021-02-01 DIAGNOSIS — E11.9 TYPE 2 DIABETES MELLITUS WITHOUT COMPLICATIONS: ICD-10-CM

## 2021-02-01 DIAGNOSIS — E78.00 PURE HYPERCHOLESTEROLEMIA, UNSPECIFIED: ICD-10-CM

## 2021-02-01 DIAGNOSIS — N64.2 ATROPHY OF BREAST: ICD-10-CM

## 2021-02-08 ENCOUNTER — RESULT REVIEW (OUTPATIENT)
Age: 61
End: 2021-02-08

## 2021-02-08 ENCOUNTER — OUTPATIENT (OUTPATIENT)
Dept: OUTPATIENT SERVICES | Facility: HOSPITAL | Age: 61
LOS: 1 days | Discharge: HOME | End: 2021-02-08
Payer: MEDICARE

## 2021-02-08 ENCOUNTER — APPOINTMENT (OUTPATIENT)
Dept: BREAST CENTER | Facility: CLINIC | Age: 61
End: 2021-02-08
Payer: MEDICARE

## 2021-02-08 VITALS
TEMPERATURE: 98.2 F | BODY MASS INDEX: 32 KG/M2 | WEIGHT: 163 LBS | HEIGHT: 60 IN | DIASTOLIC BLOOD PRESSURE: 82 MMHG | SYSTOLIC BLOOD PRESSURE: 130 MMHG

## 2021-02-08 DIAGNOSIS — Z90.49 ACQUIRED ABSENCE OF OTHER SPECIFIED PARTS OF DIGESTIVE TRACT: Chronic | ICD-10-CM

## 2021-02-08 DIAGNOSIS — Z90.89 ACQUIRED ABSENCE OF OTHER ORGANS: Chronic | ICD-10-CM

## 2021-02-08 DIAGNOSIS — Z98.890 OTHER SPECIFIED POSTPROCEDURAL STATES: Chronic | ICD-10-CM

## 2021-02-08 DIAGNOSIS — Z12.31 ENCOUNTER FOR SCREENING MAMMOGRAM FOR MALIGNANT NEOPLASM OF BREAST: ICD-10-CM

## 2021-02-08 PROCEDURE — 77067 SCR MAMMO BI INCL CAD: CPT | Mod: 26,52,LT

## 2021-02-08 PROCEDURE — 99024 POSTOP FOLLOW-UP VISIT: CPT

## 2021-02-08 NOTE — HISTORY OF PRESENT ILLNESS
[FreeTextEntry1] : Imtiaz is a 60 F with a Right breast intraductal papilloma, s/p R WLE on 2021. \par \par 2021 -- R WLE \par -sclerosing papilloma \par -atypical ductal hyperplasia \par \par Her work up was as follows: \par 2020 -- b/l screening mammogram \par -heterogenously dense breasts\par -calcifications in retroareolar R breast \par -no suspicious mass, microcalcifications or architectural distortion inL breast \par BIRADS 0 \par \par 2020 -- R dx mammogram \par -heterogenously dense breasts\par -oval circumscribed mass with calcifications in retroareolar R breast, measures 0.4 cm \par R US \par -@9N3, round circumscribed mass, measuring 3 x 2 x 2 mm\par BIRADS 4 \par \par 3/6/2020 -- repeat R dx mammogram and US \par -@9N3, fluctuating cyst, slightly decreased in size measuring 2 x 2 x 2 mm \par BIRADS 3 \par \par 2020 -- R dx mammogram and US \par -heterogenously dense breasts\par -change in calcifications in retroareolar R breast --> BIOPSY \par R US \par -@9N3, previously visualized mass is no longer seen \par BIRADS 4\par \par 10/1/2020 -- R stereotactic guided biopsy (tophat)\par -intraductal papilloma \par \par She has no breast related complaints at this time.  She denies any breast pain, has not palpated any new palpable masses in either breast and denies any nipple discharge or retraction.  \par \par HISTORICAL RISK FACTORS: \par -no prior breast biopsies or surgeries \par -no family history of breast or ovarian cancer \par -, age at first live birth was 28 \par -prior OCP use \par -no gyn surgeries\par \par INTERVAL HISTORY: \par Imtiaz returns for her FIRST POST OP VISIT, s/p R WLE on 2021. \par \par Her final pathology revealed an atypical papilloma with atypical ductal hyperplasia.  \par Her risk assessment is as follows: \par RISK ASSESSMENT: \par Tanja\par 5yr -- 3.3%\par lifetime -- 16.2%\par \par TC v6 \par 5yr -- 5.3%\par lifetime -- 20.6%\par \par She is healing well from her recent surgery.  She denies any redness, hardness, drainage, fevers or chills.  She is getting occasional sharp shooting pains in her retreoaroelar area but otherwise denies any pain.

## 2021-02-08 NOTE — ASSESSMENT
[FreeTextEntry1] : Imtiaz is a 60 F with a Right breast intraductal papilloma, s/p R WLE on 1/22/2021. \par \par 1/22/2021 -- R WLE \par -sclerosing papilloma \par -atypical ductal hyperplasia \par \par ON exam, she is healing well from her recent surgery.  There was no signs of infection, seroma or hematoma formation. \par \par Her final pathology revealed a papilloma with atypical ductal hyperplasia, but no evidence of cancer.  No further surgical intervention is indicated at this time. \par \par She is overdue for a L screening mammogram.  This will be scheduled for her today. If unrevealing, she can follow up in 6 months after a R dx mammogram due on 7/22/2021.  \par \par AS REVIEW: \par Her most recent imaging was a R dx mammogram and US on 9/18/2020 which revealed a change in her previously visualized calcifications in her retroareolar area, which was her biopsy proven intraductal papilloma.  Her previously visualized mass @9N3 is no longer seen. \par \par Intraductal papillomas without atypia are considered fibroproliferative lesions without atypia.  Patients with these lesions were found to have a slightly increased relative risk of breast cancer compared to the reference population.  However the lesions themselves do not have any malignant potential. \par \par We discussed dense breasts.  Increasing breast density has been found to increase ones risk of breast cancer, but at this time, there is no clear indication for additional imaging in this setting, as both US and MRI have not been found to improve survival.  One can consider bilateral screening US.  However, out of 1000 women screened, the use of routine US will only identify an additional 3-5 cancers.  The use of US was found to increase the likelihood of undergoing more imaging and more biopsies.  She does have dense breasts.  We have decided to proceed with screening bilateral breast US at this time.  This will be scheduled with her next screening mammogram.\par \par We discussed atypical ductal hyperplasia.  These are considered benign high risk lesions.  Additionally, the presence of ADH has been associated with an increased lifetime risk of breast cancer by about 4 fold.\par \par In light of her new diagnosis of atypical ductal hyperplasia, her risk assessment is as follows: \par RISK ASSESSMENT: \par Tanja\par 5yr -- 3.3%\par lifetime -- 16.2%\par \par TC v6 \par 5yr -- 5.3%\par lifetime -- 20.6%\par \par This puts her in the high risk category for breast cancer because she has a lifetime risk of >20%.  She qualifies for annual screening MRIs which would be done in an alternating fashion with her screening mammograms such that an imaging study and clinical breast exam would be performed every 6 months.  The use of MRIs have not been shown to prolong survival, however out of 1000 women screened, an additional 14-15 cancers will be identified.  The use of MRIs, has, however, been shown to increase the number of procedures and additional imaging because although it is a very sensitive test, it is not as specific.  This was discussed with the patient and she would like to think about proceeding with screening MRIs.  \par \par In addition, because her 5yr risk exceeds 1.7%, she also qualifies for chemopreventative medications.  For premenopausal women, we can offer tamoxifen 20 mg daily for 5 years and for postmenopausal women, we can offer either tamoxifen or raloxifene x 5 years.  This medication has been found to reduce the risk of breast cancer by about 50%.  The risks associated with these medications include thromboembolic disease, uterine cancer, and cataracts, as well as some side effects including hot flashes, vasomotor symptoms, weight gain or hair thinning/loss.  This was briefly discussed with the patient, however she would like to think about this and will let me know if she would like more information regarding this medication.\par \par \par All of her questions were answered.  She knows to call with any further questions or concerns. \par \par PLAN: \par -L screening mammogram now  \par -R dx mammogram on 7/22/2021 \par -f/up after \par -will let me know about breast MRIs and chemoppx

## 2021-02-08 NOTE — PHYSICAL EXAM
[Normocephalic] : normocephalic [Atraumatic] : atraumatic [EOMI] : extra ocular movement intact [No Supraclavicular Adenopathy] : no supraclavicular adenopathy [No Cervical Adenopathy] : no cervical adenopathy [No dominant masses] : no dominant masses in right breast  [No dominant masses] : no dominant masses left breast [No Nipple Retraction] : no left nipple retraction [No Nipple Discharge] : no left nipple discharge [No Axillary Lymphadenopathy] : no left axillary lymphadenopathy [Soft] : abdomen soft [Not Tender] : non-tender [No Edema] : no edema [No Rashes] : no rashes [No Ulceration] : no ulceration [de-identified] : surgical incision is healing well with dermabond still in place, was removed in the office today; no signs of infection, seroma or hematoma formation  [de-identified] : no suspicious abnormalities palpated

## 2021-02-08 NOTE — DATA REVIEWED
[FreeTextEntry1] : Armond Accession Number : [id number]\par \par KEVON TERRELL                     2\par \par \par \par Surgical Final Report\par \par \par \par \par Final Diagnosis\par Breast, right retroareolar mass, radiofrequency seed localized\par lumpectomy:\par - Sclerosing intraductal papilloma with stromal calcification\par containing both florid and focally atypical ductal hyperplasia\par (ADH), micropapillary type (atypical papilloma).\par - The epithelial atypia is located 1.0 mm from the nearest inked\par and designated lateral surgical margin.\par - Atrophic fatty breast tissue with proliferative type\par fibrocystic changes associated with microcalcifications.\par - Healing prior biopsy site changes.\par \par Comment: The diagnosis is supported by immunostains negative for\par CK 5/6 and diffusely strongly positive for ER in the foci of\par ductal epithelial atypia (ADH).\par \par Note:  All controls show appropriate reactivity.\par \par This test was developed and its performance characteristics\par determined by [facility].  It has not been\par cleared or approved by the U.[place]  Food and Drug Administration.\par The FDA does not require this test to go through premarket FDA\par review. This test is used for clinical purposes.  It should not\par be regarded as investigational or for research. This assay has\par not been validated for decalcified tissues.  Results should be\par interpreted with caution given the likelihood of false negativity\par on decalcified specimens.  This laboratory is regulated under the\par Clinical Laboratory Improvement Amendments of 1988 (CLIA) as\par qualified to perform high complexity clinical laboratory testing.\par \par The interpretation of this test was performed at [facility], [address] [place] [address].\par \par Verified by: Merlin De Los Santos M.D.\par (Electronic Signature)\par Reported on: 01/28/21 15:53 EST, [address] [place] [address]\par Phone: [phone]   Fax: [phone]\par

## 2021-04-06 ENCOUNTER — TRANSCRIPTION ENCOUNTER (OUTPATIENT)
Age: 61
End: 2021-04-06

## 2021-07-23 ENCOUNTER — OUTPATIENT (OUTPATIENT)
Dept: OUTPATIENT SERVICES | Facility: HOSPITAL | Age: 61
LOS: 1 days | Discharge: HOME | End: 2021-07-23
Payer: MEDICARE

## 2021-07-23 ENCOUNTER — RESULT REVIEW (OUTPATIENT)
Age: 61
End: 2021-07-23

## 2021-07-23 DIAGNOSIS — Z98.890 OTHER SPECIFIED POSTPROCEDURAL STATES: Chronic | ICD-10-CM

## 2021-07-23 DIAGNOSIS — Z90.49 ACQUIRED ABSENCE OF OTHER SPECIFIED PARTS OF DIGESTIVE TRACT: Chronic | ICD-10-CM

## 2021-07-23 DIAGNOSIS — Z90.89 ACQUIRED ABSENCE OF OTHER ORGANS: Chronic | ICD-10-CM

## 2021-07-23 DIAGNOSIS — R92.8 OTHER ABNORMAL AND INCONCLUSIVE FINDINGS ON DIAGNOSTIC IMAGING OF BREAST: ICD-10-CM

## 2021-07-23 PROCEDURE — 76642 ULTRASOUND BREAST LIMITED: CPT | Mod: 26,RT

## 2021-07-23 PROCEDURE — G0279: CPT | Mod: 26

## 2021-07-23 PROCEDURE — 77065 DX MAMMO INCL CAD UNI: CPT | Mod: 26,RT

## 2021-07-26 ENCOUNTER — NON-APPOINTMENT (OUTPATIENT)
Age: 61
End: 2021-07-26

## 2021-07-28 ENCOUNTER — APPOINTMENT (OUTPATIENT)
Dept: BREAST CENTER | Facility: CLINIC | Age: 61
End: 2021-07-28
Payer: MEDICARE

## 2021-07-28 VITALS
HEIGHT: 60 IN | TEMPERATURE: 98.7 F | SYSTOLIC BLOOD PRESSURE: 126 MMHG | DIASTOLIC BLOOD PRESSURE: 80 MMHG | BODY MASS INDEX: 32.79 KG/M2 | WEIGHT: 167 LBS

## 2021-07-28 DIAGNOSIS — R92.2 INCONCLUSIVE MAMMOGRAM: ICD-10-CM

## 2021-07-28 DIAGNOSIS — N60.91 UNSPECIFIED BENIGN MAMMARY DYSPLASIA OF RIGHT BREAST: ICD-10-CM

## 2021-07-28 PROCEDURE — 99212 OFFICE O/P EST SF 10 MIN: CPT

## 2021-07-29 NOTE — ASSESSMENT
[FreeTextEntry1] : Imtiaz is a 61 F with a Right breast intraductal papilloma, s/p R WLE on 1/22/2021. \par \par 1/22/2021 -- R WLE \par -sclerosing papilloma \par -atypical ductal hyperplasia \par \par ON exam, I was not able to palpate any suspicious abnormalities within either breast. \par \par Her most recent imaging on 07/23/21 which revealed, postsurgical architectural distortion in the right breast, and on R breast US, @12:00 N5, she has an oval circumscribed mass measuring 0.4 x 0.4 x 0.3 cm favored to be simple cyst, deemed BI-RADS Category 2: Benign. \par \par She will be due for a b/l screening mammogram and US on 1/23/2022.  This will be scheduled for her today.  I will have her follow up after for a CBE. \par \par AS REVIEW: \par Her final pathology revealed a papilloma with atypical ductal hyperplasia, but no evidence of cancer.  No further surgical intervention is indicated at this time.  \par \par AS REVIEW: \par Her most recent imaging was a R dx mammogram and US on 9/18/2020 which revealed a change in her previously visualized calcifications in her retroareolar area, which was her biopsy proven intraductal papilloma.  Her previously visualized mass @9N3 is no longer seen. \par \par Intraductal papillomas without atypia are considered fibroproliferative lesions without atypia.  Patients with these lesions were found to have a slightly increased relative risk of breast cancer compared to the reference population.  However the lesions themselves do not have any malignant potential. \par \par We discussed dense breasts.  Increasing breast density has been found to increase ones risk of breast cancer, but at this time, there is no clear indication for additional imaging in this setting, as both US and MRI have not been found to improve survival.  One can consider bilateral screening US.  However, out of 1000 women screened, the use of routine US will only identify an additional 3-5 cancers.  The use of US was found to increase the likelihood of undergoing more imaging and more biopsies.  She does have dense breasts.  We have decided to proceed with screening bilateral breast US at this time.  This will be scheduled with her next screening mammogram.\par \par We discussed atypical ductal hyperplasia.  These are considered benign high risk lesions.  Additionally, the presence of ADH has been associated with an increased lifetime risk of breast cancer by about 4 fold.\par \par In light of her new diagnosis of atypical ductal hyperplasia, her risk assessment is as follows: \par RISK ASSESSMENT: \par Tanja\par 5yr -- 3.3%\par lifetime -- 16.2%\par \par TC v6 \par 5yr -- 5.3%\par lifetime -- 20.6%\par \par This puts her in the high risk category for breast cancer because she has a lifetime risk of >20%.  She qualifies for annual screening MRIs which would be done in an alternating fashion with her screening mammograms such that an imaging study and clinical breast exam would be performed every 6 months.  The use of MRIs have not been shown to prolong survival, however out of 1000 women screened, an additional 14-15 cancers will be identified.  The use of MRIs, has, however, been shown to increase the number of procedures and additional imaging because although it is a very sensitive test, it is not as specific.  This was discussed with the patient and she would like to think about proceeding with screening MRIs.  \par \par In addition, because her 5yr risk exceeds 1.7%, she also qualifies for chemopreventative medications.  For premenopausal women, we can offer tamoxifen 20 mg daily for 5 years and for postmenopausal women, we can offer either tamoxifen or raloxifene x 5 years.  This medication has been found to reduce the risk of breast cancer by about 50%.  The risks associated with these medications include thromboembolic disease, uterine cancer, and cataracts, as well as some side effects including hot flashes, vasomotor symptoms, weight gain or hair thinning/loss.  This was briefly discussed with the patient, however she would like to think about this and will let me know if she would like more information regarding this medication.\par \par \par All of her questions were answered.  She knows to call with any further questions or concerns. \par \par PLAN: \par -b/l screening mammogram and US on 1/23/2022 \par -f/up after \par -will let me know about breast MRIs and chemoppx

## 2021-07-29 NOTE — HISTORY OF PRESENT ILLNESS
[FreeTextEntry1] : Imtiaz is a 61 F with a Right breast intraductal papilloma, s/p R WLE on 2021. \par \par 2021 -- R WLE \par -sclerosing papilloma \par -atypical ductal hyperplasia \par \par Her work up was as follows: \par 2020 -- b/l screening mammogram \par -heterogenously dense breasts\par -calcifications in retroareolar R breast \par -no suspicious mass, microcalcifications or architectural distortion inL breast \par BIRADS 0 \par \par 2020 -- R dx mammogram \par -heterogenously dense breasts\par -oval circumscribed mass with calcifications in retroareolar R breast, measures 0.4 cm \par R US \par -@9N3, round circumscribed mass, measuring 3 x 2 x 2 mm\par BIRADS 4 \par \par 3/6/2020 -- repeat R dx mammogram and US \par -@9N3, fluctuating cyst, slightly decreased in size measuring 2 x 2 x 2 mm \par BIRADS 3 \par \par 2020 -- R dx mammogram and US \par -heterogenously dense breasts\par -change in calcifications in retroareolar R breast --> BIOPSY \par R US \par -@9N3, previously visualized mass is no longer seen \par BIRADS 4\par \par 10/1/2020 -- R stereotactic guided biopsy (tophat)\par -intraductal papilloma \par \par She has no breast related complaints at this time.  She denies any breast pain, has not palpated any new palpable masses in either breast and denies any nipple discharge or retraction.  \par \par HISTORICAL RISK FACTORS: \par -no prior breast biopsies or surgeries \par -no family history of breast or ovarian cancer \par -, age at first live birth was 28 \par -prior OCP use \par -no gyn surgeries\par \par INTERVAL HISTORY: \par Imtiaz returns for her follow up visit, s/p R WLE on 2021. \par \par Her final pathology revealed an atypical papilloma with atypical ductal hyperplasia.  \par Her risk assessment is as follows: \par RISK ASSESSMENT: \par Tanja\par 5yr -- 3.3%\par lifetime -- 16.2%\par \par TC v6 \par 5yr -- 5.3%\par lifetime -- 20.6%\par \par INTERVAL HISTORY:\par 2021 \par Imtiaz returns for a 6 month follow up for benign intermediate to high risk disease 2/2 R intraductal papilloma and atypical ductal hyperplasia. \par \par She has no breast related complaints at this time.  She denies any breast pain, has not palpated any new palpable masses in either breast and denies any nipple discharge or retraction.  \par \par Her most recent imaging on 21 which revealed, postsurgical architectural distortion in the right breast, and on R breast US, @12:00 N5, she has an oval circumscribed mass measuring 0.4 x 0.4 x 0.3 cm favored to be simple cyst, deemed BI-RADS Category 2: Benign. \par

## 2021-07-29 NOTE — DATA REVIEWED
[FreeTextEntry1] : EXAM:  MG US BREAST LIMITED RT\par EXAM:  MG MAMMO DIAG W FRANSISCO RT#\par 07/23/2021\par \par \par \par INTERPRETATION:  Clinical History / Reason for exam: Follow-up excisional biopsy of intraductal papilloma in the right breast.\par \par The patient reports last clinical breast examination was performed about six months ago.\par \par Family history of breast cancer: There is no family history of breast cancer.\par \par Comparisons: Mammograms dating back to 2018. Breast ultrasound dating back to 2020.\par \par Views obtained: right full field digital 2D and digital tomosynthesis images as well as spot views.\par \par Computer-aided detection was utilized in the interpretation of this examination.\par \par Breast composition: The breasts are heterogeneously dense, which may obscure small masses.\par \par Findings:\par \par Mammogram:\par \par No suspicious mass, microcalcifications or areas of architectural distortion seen in the right breast. Postsurgical architectural distortion is noted in the right breast. Previous biopsy marker is noted in the medial right breast.\par \par Ultrasound:\par \par Targeted unilateral right breast ultrasound was performed.\par \par At 12:00 N5 there is an oval circumscribed mass measuring 0.4 x 0.4 x 0.3 cm favored to be simple cyst.\par \par Impression: No mammographic or sonographic evidence of malignancy.\par \par Recommendation: Unless otherwise indicated by clinical findings, annual screening mammography recommended.\par \par \par BI-RADS Category 2: Benign\par \par

## 2021-07-29 NOTE — PHYSICAL EXAM
[Normocephalic] : normocephalic [Atraumatic] : atraumatic [EOMI] : extra ocular movement intact [No Supraclavicular Adenopathy] : no supraclavicular adenopathy [No Cervical Adenopathy] : no cervical adenopathy [No dominant masses] : no dominant masses in right breast  [No dominant masses] : no dominant masses left breast [No Nipple Retraction] : no left nipple retraction [No Nipple Discharge] : no left nipple discharge [No Axillary Lymphadenopathy] : no left axillary lymphadenopathy [Soft] : abdomen soft [Not Tender] : non-tender [No Edema] : no edema [No Rashes] : no rashes [No Ulceration] : no ulceration [de-identified] : surgical incision is well healed, no other suspicious abnormalities palpated  [de-identified] : no suspicious abnormalities palpated

## 2021-08-26 ENCOUNTER — NON-APPOINTMENT (OUTPATIENT)
Age: 61
End: 2021-08-26

## 2021-11-04 DIAGNOSIS — N64.52 NIPPLE DISCHARGE: ICD-10-CM

## 2021-11-16 ENCOUNTER — RESULT REVIEW (OUTPATIENT)
Age: 61
End: 2021-11-16

## 2021-11-16 ENCOUNTER — OUTPATIENT (OUTPATIENT)
Dept: OUTPATIENT SERVICES | Facility: HOSPITAL | Age: 61
LOS: 1 days | Discharge: HOME | End: 2021-11-16
Payer: MEDICARE

## 2021-11-16 DIAGNOSIS — Z98.890 OTHER SPECIFIED POSTPROCEDURAL STATES: Chronic | ICD-10-CM

## 2021-11-16 DIAGNOSIS — Z90.49 ACQUIRED ABSENCE OF OTHER SPECIFIED PARTS OF DIGESTIVE TRACT: Chronic | ICD-10-CM

## 2021-11-16 DIAGNOSIS — N64.52 NIPPLE DISCHARGE: ICD-10-CM

## 2021-11-16 DIAGNOSIS — Z90.89 ACQUIRED ABSENCE OF OTHER ORGANS: Chronic | ICD-10-CM

## 2021-11-16 PROCEDURE — G0279: CPT | Mod: 26

## 2021-11-16 PROCEDURE — 77065 DX MAMMO INCL CAD UNI: CPT | Mod: 26,RT

## 2021-11-16 PROCEDURE — 76642 ULTRASOUND BREAST LIMITED: CPT | Mod: 26,RT

## 2021-11-17 VITALS
HEIGHT: 60 IN | WEIGHT: 167 LBS | TEMPERATURE: 98.2 F | SYSTOLIC BLOOD PRESSURE: 150 MMHG | DIASTOLIC BLOOD PRESSURE: 79 MMHG | BODY MASS INDEX: 32.79 KG/M2

## 2021-12-17 ENCOUNTER — OUTPATIENT (OUTPATIENT)
Dept: OUTPATIENT SERVICES | Facility: HOSPITAL | Age: 61
LOS: 1 days | Discharge: HOME | End: 2021-12-17
Payer: MEDICARE

## 2021-12-17 ENCOUNTER — RESULT REVIEW (OUTPATIENT)
Age: 61
End: 2021-12-17

## 2021-12-17 DIAGNOSIS — Z90.89 ACQUIRED ABSENCE OF OTHER ORGANS: Chronic | ICD-10-CM

## 2021-12-17 DIAGNOSIS — Z98.890 OTHER SPECIFIED POSTPROCEDURAL STATES: Chronic | ICD-10-CM

## 2021-12-17 DIAGNOSIS — N64.52 NIPPLE DISCHARGE: ICD-10-CM

## 2021-12-17 DIAGNOSIS — Z90.49 ACQUIRED ABSENCE OF OTHER SPECIFIED PARTS OF DIGESTIVE TRACT: Chronic | ICD-10-CM

## 2021-12-17 PROCEDURE — 77049 MRI BREAST C-+ W/CAD BI: CPT | Mod: 26

## 2021-12-21 ENCOUNTER — NON-APPOINTMENT (OUTPATIENT)
Age: 61
End: 2021-12-21

## 2022-02-07 ENCOUNTER — NON-APPOINTMENT (OUTPATIENT)
Age: 62
End: 2022-02-07

## 2022-02-09 ENCOUNTER — NON-APPOINTMENT (OUTPATIENT)
Age: 62
End: 2022-02-09

## 2022-02-15 ENCOUNTER — APPOINTMENT (OUTPATIENT)
Dept: BREAST CENTER | Facility: CLINIC | Age: 62
End: 2022-02-15

## 2022-02-25 ENCOUNTER — RESULT REVIEW (OUTPATIENT)
Age: 62
End: 2022-02-25

## 2022-02-25 ENCOUNTER — OUTPATIENT (OUTPATIENT)
Dept: OUTPATIENT SERVICES | Facility: HOSPITAL | Age: 62
LOS: 1 days | Discharge: HOME | End: 2022-02-25
Payer: MEDICARE

## 2022-02-25 DIAGNOSIS — Z90.89 ACQUIRED ABSENCE OF OTHER ORGANS: Chronic | ICD-10-CM

## 2022-02-25 DIAGNOSIS — Z90.49 ACQUIRED ABSENCE OF OTHER SPECIFIED PARTS OF DIGESTIVE TRACT: Chronic | ICD-10-CM

## 2022-02-25 DIAGNOSIS — R92.8 OTHER ABNORMAL AND INCONCLUSIVE FINDINGS ON DIAGNOSTIC IMAGING OF BREAST: ICD-10-CM

## 2022-02-25 DIAGNOSIS — Z98.890 OTHER SPECIFIED POSTPROCEDURAL STATES: Chronic | ICD-10-CM

## 2022-02-25 PROCEDURE — 77066 DX MAMMO INCL CAD BI: CPT | Mod: 26

## 2022-02-25 PROCEDURE — 19085 BX BREAST 1ST LESION MR IMAG: CPT | Mod: RT

## 2022-02-25 PROCEDURE — G0279: CPT | Mod: 26

## 2022-02-25 PROCEDURE — 88342 IMHCHEM/IMCYTCHM 1ST ANTB: CPT | Mod: 26,59

## 2022-02-25 PROCEDURE — 88341 IMHCHEM/IMCYTCHM EA ADD ANTB: CPT | Mod: 26,59

## 2022-02-25 PROCEDURE — 88360 TUMOR IMMUNOHISTOCHEM/MANUAL: CPT | Mod: 26

## 2022-02-25 PROCEDURE — 88305 TISSUE EXAM BY PATHOLOGIST: CPT | Mod: 26

## 2022-02-25 PROCEDURE — 19086 BX BREAST ADD LESION MR IMAG: CPT | Mod: RT

## 2022-02-28 ENCOUNTER — NON-APPOINTMENT (OUTPATIENT)
Age: 62
End: 2022-02-28

## 2022-02-28 LAB — SURGICAL PATHOLOGY STUDY: SIGNIFICANT CHANGE UP

## 2022-03-02 ENCOUNTER — APPOINTMENT (OUTPATIENT)
Dept: BREAST CENTER | Facility: CLINIC | Age: 62
End: 2022-03-02
Payer: MEDICARE

## 2022-03-02 VITALS
SYSTOLIC BLOOD PRESSURE: 152 MMHG | HEIGHT: 60 IN | TEMPERATURE: 97.2 F | BODY MASS INDEX: 32.98 KG/M2 | WEIGHT: 168 LBS | DIASTOLIC BLOOD PRESSURE: 98 MMHG

## 2022-03-02 PROCEDURE — 99214 OFFICE O/P EST MOD 30 MIN: CPT

## 2022-03-02 NOTE — ASSESSMENT
[FreeTextEntry1] : Imtiaz is a 61 F with a Right breast intraductal papilloma, s/p R WLE on 1/22/2021, now with a MR detected RIGHT breast invasive mammary carcinoma, [id number], ER/SD pos, her 2 PENDING, anatomic stage IIB, AJCC 8th edition. \par \par 1/22/2021 -- R WLE \par -sclerosing papilloma \par -atypical ductal hyperplasia \par \par ON exam, in her right breast, her surgical incision is well healed, in the superior breast, she has two areas of resolving hematomas/ecchymoses with a non-distinct density in between these areas; no overlying skin changes, no other suspicious abnormalities were palpated; no lymphadenopathy.  NO suspicious abnormalities were palpated within the left breast. \par  \par Her most recent imaging was a breast MRI on 12/17/2021 which revealed in her right breast, extensive nonmass enhancement in R superior breast with areas of clumped irregular enhancement, spans 5.4 x 5.3 x 7.8 cm --> medial and lateral aspects were biopsied and found to be invasive mammary carcinoma.  No lymphadenopathy was appreciated in the right side. \par \par We have discussed her new diagnosis of breast cancer.  She is currently a stage IIB breast cancer, based off AJCC 8th edition.  We discussed her surgical and other treatment options at this time. \par \par In regards to her RIGHT sided breast cancer, she is NOT a great candidate for breast conservation surgery as her area of disease spans 7.8 cm.  Her lesion is located in the superior breast  based off the MRI so she is a candidate for a nipple sparing mastectomy.  However, there is no difference in survival between a lumpectomy + radiation therapy and a mastectomy.  However the rate of local recurrence is slightly higher with the lumpectomy. The rate of recurrence with a mastectomy is not zero, however, and is likely closer to 4-9%.  \par \par Regardless of if she chooses a mastectomy or a lumpectomy, she would require an evaluation of her axillary lymph nodes via a sentinel lymph node biopsy.  This is done by injecting the perioareolar breast tissue with dye prior to the surgery and removing 1-5 lymph nodes that are the first to drain the breast.  \par \par At this time, I have recommended proceeding with a R mastectomy and SLN Bx given the extent of disease. However if she was very motivated to undergo breast conservation surgery, we could consider performing a wide local excision with bracketted RF tags x 2 followed by an oncoplastic reduction, although we did discuss that if her margins were positive we would need to proceed with a mastectomy at that time.  In addition, given that her breast cancer was mammographically and sonographically occult, it would be more difficult to assess for recurrences in the future.  \par \par We did briefly discuss the different radiation options.  If she got a mastectomy, she would likely only need radiation therapy if she had a lot of positive margins or positive lymph nodes. If she undergoes a lumpectomy, she is a candidate for whole breast irradiation.  \par \par In regards to systemic therapy, we briefly discussed both chemotherapy and endocrine therapy. If the tumor is larger than 1 cm and her 2 negative, we would likely need to send an additional study on her tumor sample, called an oncotype DX to make the determination regarding if chemotherapy would help her.  At the completion of all these treatments, she should get endocrine therapy, at current time she would need an AI, for a total of at least 5 years.  \par \par If her HER2 was positive, I have also given her the option of neoadjuvant chemotherapy with Her 2 directed therapy as a means to shrink her tumor and possible avoid having to perform a mastectomy.  She is interested in this option if she is Her 2 positive.  We will call her back when the Her 2 is finalized. \par \par Per ASBrS consensus guidelines, any patient with a diagnosis of breast cancer should be offered panel genetic testing as 10% of these patients were found to have a mutation.  THis was offered to her and she would like to proceed with panel genetic testing.  Her blood was drawn today. \par \par In regards to reconstruction, if she decides to proceed with a mastectomy,  she has the option for undergoing immediate breast reconstruction.  For this reason, I will have her see plastic surgery for further discussion of her reconstruction options.  \par \par All of her questions were answered.  She knows to call with any further questions or concerns. \par  \par AS REVIEW: \par Her most recent imaging on 07/23/21 which revealed, postsurgical architectural distortion in the right breast, and on R breast US, @12:00 N5, she has an oval circumscribed mass measuring 0.4 x 0.4 x 0.3 cm favored to be simple cyst, deemed BI-RADS Category 2: Benign. \par \par Her final pathology revealed a papilloma with atypical ductal hyperplasia, but no evidence of cancer.  No further surgical intervention is indicated at this time.  \par \par AS REVIEW: \par Her most recent imaging was a R dx mammogram and US on 9/18/2020 which revealed a change in her previously visualized calcifications in her retroareolar area, which was her biopsy proven intraductal papilloma.  Her previously visualized mass @9N3 is no longer seen. \par \par Intraductal papillomas without atypia are considered fibroproliferative lesions without atypia.  Patients with these lesions were found to have a slightly increased relative risk of breast cancer compared to the reference population.  However the lesions themselves do not have any malignant potential. \par \par We discussed dense breasts.  Increasing breast density has been found to increase ones risk of breast cancer, but at this time, there is no clear indication for additional imaging in this setting, as both US and MRI have not been found to improve survival.  One can consider bilateral screening US.  However, out of 1000 women screened, the use of routine US will only identify an additional 3-5 cancers.  The use of US was found to increase the likelihood of undergoing more imaging and more biopsies.  She does have dense breasts.  We have decided to proceed with screening bilateral breast US at this time.  This will be scheduled with her next screening mammogram.\par \par We discussed atypical ductal hyperplasia.  These are considered benign high risk lesions.  Additionally, the presence of ADH has been associated with an increased lifetime risk of breast cancer by about 4 fold.\par \par In light of her new diagnosis of atypical ductal hyperplasia, her risk assessment is as follows: \par RISK ASSESSMENT: \par Tanja\par 5yr -- 3.3%\par lifetime -- 16.2%\par \par TC v6 \par 5yr -- 5.3%\par lifetime -- 20.6%\par \par This puts her in the high risk category for breast cancer because she has a lifetime risk of >20%.  She qualifies for annual screening MRIs which would be done in an alternating fashion with her screening mammograms such that an imaging study and clinical breast exam would be performed every 6 months.  The use of MRIs have not been shown to prolong survival, however out of 1000 women screened, an additional 14-15 cancers will be identified.  The use of MRIs, has, however, been shown to increase the number of procedures and additional imaging because although it is a very sensitive test, it is not as specific.  This was discussed with the patient and she would like to think about proceeding with screening MRIs.  \par \par PLAN: \par -SOZO\par -INVITAE panel genetic testing -- blood drawn \par -awaiting Her 2 results: If her HER2 is negative, we will refer her to plastic surgery and schedule her for the following surgery: RIGHT MASTECTOMY, SENTINEL NODE MAPPING AND BIOPSY, IMMEDIATE RECONSTRUCTION, PEC BLOCK.  If her HER2 is positive, she will be referred to medical oncology for further discussion of adjuvant chemotherapy. \par -f/up after

## 2022-03-02 NOTE — DATA REVIEWED
[FreeTextEntry1] : EXAM:  MG US BREAST LIMITED RT\par EXAM:  MG MAMMO DIAG W FRANSISCO RT#\par \par \par PROCEDURE DATE:  11/16/2021\par \par \par \par INTERPRETATION:  Clinical History / Reason for exam: Bloody right nipple discharge.\par \par The patient is status post papilloma excision in January, 2021. She now presents with new spontaneous right bloody nipple discharge\par \par Clinical breast exam: The patient reports her last clinical breast examination was performed within the past year.\par \par Comparisons:7/23/2021, 9/18/2020, 2/23/2020, 1/24/2020, 1/22/2019.\par \par Views obtained:Routine, spot compression and spot compression magnification views of the right breast.\par \par Computer-aided detection was utilized in the interpretation of this examination.\par \par Breast composition:The breasts are heterogeneously dense, which may obscure small masses.\par \par Mammographic findings: There is architectural distortion in the retroareolar region from recent benign excision. There are scattered coarse benign appearing calcifications. No suspicious mass, microcalcifications or areas of architectural distortion is seen.\par \par Targeted right breast ultrasound: The right retroperiareolar region was examined widely. No solid or cystic lesion is seen. No intraductal lesion is identified.\par \par Impression:No mammographic or sonographic evidence of malignancy in the right breast..\par \par Recommendation: Breast MRI is recommended for further evaluation of bloody nipple discharge.\par \par BI-RADS Category 2: Benign\par \par Findings and recommendations were discussed with the patient at the termination of the examination. The above findings and recommendations were discussed with Amarilys from Dr. Rodriguez's office on 11/16/2021 at 4:30 PM with read back.\par \par --- End of Report ---\par \par \par \par \par EFFIE TIWARI MD; Attending Radiologist\par This document has been electronically signed. Nov 16 2021  4:29PM\par \par ACC: [id number]     EXAM:  MR BREAST WAW IC BI\par \par PROCEDURE DATE:  12/17/2021\par \par \par \par INTERPRETATION:  CLINICAL HISTORY: Right breast bloody nipple discharge. Recent Covid vaccine booster in the left upper extremity on 11/7/2021.\par \par TECHNIQUE: Breast MRI is performed at 1.5 T with the patient prone and the breasts in a dedicated breast coil. Following a 3 plane localizer, sagittal T1 weighted, fat-saturated T1 weighted and fat saturated T2-weighted sequence; dynamic contrast enhanced sagittal images; and delayed post-contrast axial fat-saturated T1 weighted images were obtained. 7.5 mL of Gadavist intravenous contrast were injected and 0 mL was discarded. Subtraction and MIP images were reviewed.\par \par COMPARISON: Mammogram/ultrasound of 11/16/2021.\par \par FINDINGS:\par \par Amount of fibroglandular tissue: Heterogeneous fibroglandular tissue.\par \par Background parenchymal enhancement: Mild, Symmetric.\par \par RIGHT BREAST:\par Extensive regional nonmass enhancement in the right superior breast with areas of clumped irregular enhancement. This spans a distance of approximately 5.4 x 5.3 x 7.8 cm (TRV x CC x AP). MRI guided biopsies of the more irregular enhancement (series 7/167) which is relatively medial as well as a lateral area of clumped nonmass enhancement (series 700/178) are recommended. Enhancement also noted to extend to the nipple (series 700/168). No right axillary adenopathy.\par \par LEFT BREAST:\par Scattered foci of nonspecific enhancement in the left breast. Enlarged left axillary lymph nodes consistent with recent vaccine history. Low-lying left axillary lymph node also noted.\par \par \par IMPRESSION:\par \par 1. Large irregular nonmass enhancement in the right superior breast as above. MRI guided biopsy of 2 areas is recommended.\par 2. No MRI evidence of malignancy in the left breast.\par \par Recommendation: MRI guided biopsy.\par \par BI-RADS Category 4: Suspicious\par \par Findings and recommendations were discussed with Dr. Rodriguez on 12/21/2021.\par \par --- End of Report ---\par \par \par \par \par \par DANIEL GARG MD; Attending Radiologist\par This document has been electronically signed. Dec 21 2021  8:32AM\par \par ACC: [id number]     EXAM:  MR BX BRST ADD RT SISC\par ACC: [id number]     EXAM:  MG MAMMO DIAG W FRANSISCO BI#\par ACC: [id number]     EXAM:  MR BX BRST 1ST RT SISC\par \par *** ADDENDUM***\par \par Pathology:\par \par Site 1, right lateral breast:\par -Invasive well differentiated mammary carcinoma with both tubular and lobular features (tubulo-lobular carcinoma) associated with microcalcifications and signet ring cell formation.\par -Duct carcinoma in situ, micropapillary and solid types with comedo necrosis and microcalcifications, intermediate nuclear grade extending to involve a sclerosing intraductal papilloma with focal coarse stromal calcifications (atypical papilloma).\par -Focal atypical lobular hyperplasia and proliferative type fibrocystic changes associated with microcalcifications.\par -Numerous foci of micropapillary atypical cell clusters within lymphovascular spaces. Lymphovascular invasion cannot be excluded.\par \par This is malignant histology.\par \par Site 2, right medial breast:\par -Invasive well differentiated mammary carcinoma with both tubular and lobular features (tubulo-lobular carcinoma) associated with microcalcifications and signet ring cell formation.\par -Duct carcinoma in situ, micropapillary and solid types with comedo necrosis and microcalcifications, intermediate nuclear grade extending to involve a sclerosing intraductal papilloma with focal coarse stromal calcifications (atypical papilloma).\par -Focal atypical lobular hyperplasia and proliferative type fibrocystic changes associated with microcalcifications.\par -Numerous foci of micropapillary atypical cell clusters within lymphovascular spaces. Lymphovascular invasion cannot be excluded.\par \par This is malignant histology.\par \par Surgical/oncologic management is recommended for both areas. Findings and recommendations were discussed with the patient and Dr. Rodriguez's office on 2/28/2022.\par \par --- End of Report ---\par \par *** END OF ADDENDUM***\par \par \par PROCEDURE DATE:  02/25/2022\par \par \par \par INTERPRETATION:  CLINICAL INDICATION: Right breast nonmass enhancement.\par \par The procedure, its risks, benefits, and alternatives were  explained to the patient, who expressed understanding and gave informed written and verbal consent, including consent for intravenous gadolinium.  A time-out, which included the patient's full name, date of birth, description of the expected procedure and procedure site, was performed immediately before the procedure.\par \par TECHNIQUE/FINDINGS: MR imaging of the right breast was performed using a 1.5 valencia GE magnet and dedicated 8 channel breast coil. 7.5 cc of of Gadavist intravenous contrast were administered. IV access was obtained on site. The examination was performed with a biopsy grid in place. Sequences include sagittal dynamic fat suppressed pre and post contrast gradient echo imaging.\par \par Site 1, lateral:\par Using the usual sterile technique with 1% lidocaine as local anesthesia, a small dermatotomy was made in the skin. Under MR guidance, using a 9 gauge vacuum-assisted device, the previously described area in the right breast, relatively lateral, was sampled, with 12 core biopsies obtained. Post biopsy imaging in the sagittal plane demonstrated a air medial to the biopsy site, therefore needle was retracted approximately 5 mm laterally and 6 additional samples were obtained. A Hologic Hourglass biopsy clip was placed in the biopsy cavity.\par \par Site 2, medial:\par Using the usual sterile technique with 1% lidocaine as local anesthesia, a small dermatotomy was made in the skin. Under MR guidance, using a 9 gauge vacuum-assisted device, the previously described area in the right breast, relatively medial, was sampled, with 12 core biopsies obtained. Post biopsy imaging in the sagittal plane demonstrated a hematoma at the biopsy site. A Hologic Cork biopsy clip was placed in the biopsy cavity.\par \par Hemostasis was maintained with manual pressure. A sterile dressing was applied.\par \par A bilateral mammogram was performed. Postbiopsy mammogram was obtained of the right breast. In addition, the patient was due for her annual screening mammogram of the left breast.\par \par VIEWS: 2D/tomosynthesis CC and ML views of the right breast. 2D/tomosynthesis cc/MLO views of the left breast.\par BREAST COMPOSITION: The breasts are heterogeneously dense, which may obscure small masses.\par FINDINGS: The Hologic hourglass shaped biopsy clip from site 1 placed during the MRI guided biopsy is in the anticipated location in the right lateral breast. The cork shaped clip from site 2 was extruded from the breast after the biopsy. If surgically warranted, the medial biopsy site may be localized utilizing the other clips as landmarks on both the CC and ML views.\par \par Stable calcifications in the left lateral breast are unchanged from 2018 consistent with a benign etiology. Stable nodular asymmetries in the left breast are also unchanged from 2018. No suspicious masses, calcifications, or areas of architectural distortion are seen in the left breast. There has been no significant interval change from the prior study.\par \par Left breast: BI-RADS Category 2: Benign\par Right breast: FINAL ASSESSMENT Category: Post Procedure Mammogram for Marker Placement\par \par The patient tolerated the procedure well and was discharged home in stable condition, with written discharge instructions.\par \par \par IMPRESSION:\par \par 1.Successful MRI guided biopsy of the right breast.\par \par 2. No mammographic evidence of malignancy in the left breast.\par \par Pathology: Pending, to be reported as an addendum.\par \par --- End of Report ---\par \par ***Please see the addendum at the top of this report. It may contain additional important information or changes.****\par \par \par \par DANIEL GARG MD; Attending Radiologist\par This document has been electronically signed. Feb 25 2022  3:21PM\par Addend:DANIEL GARG MD; Attending Radiologist\par This addendum was electronically signed on: Feb 28 2022  4:38PM

## 2022-03-02 NOTE — PHYSICAL EXAM
[Normocephalic] : normocephalic [Atraumatic] : atraumatic [EOMI] : extra ocular movement intact [No Supraclavicular Adenopathy] : no supraclavicular adenopathy [No Cervical Adenopathy] : no cervical adenopathy [No dominant masses] : no dominant masses in right breast  [No dominant masses] : no dominant masses left breast [No Nipple Retraction] : no left nipple retraction [No Nipple Discharge] : no left nipple discharge [No Axillary Lymphadenopathy] : no left axillary lymphadenopathy [Soft] : abdomen soft [Not Tender] : non-tender [No Edema] : no edema [No Rashes] : no rashes [No Ulceration] : no ulceration [de-identified] : surgical incision is well healed, in the superior breast, she has two areas of resolving hematomas/ecchymoses with a non-distinct density in between these areas; no overlying skin changes, no other suspicious abnormalities were palpated; no lymphadenopathy  [de-identified] : no suspicious abnormalities palpated

## 2022-03-02 NOTE — HISTORY OF PRESENT ILLNESS
[FreeTextEntry1] : Imtiaz is a 61 F with a Right breast intraductal papilloma, s/p R WLE on 2021, now with a MR detected RIGHT breast invasive mammary carcinoma, [id number], ER/MA pos, her 2 PENDING, anatomic stage IIB, AJCC 8th edition. \par \par Of note, Imtiaz is a 911 survivor. \par \par 2021 -- R WLE \par -sclerosing papilloma \par -atypical ductal hyperplasia \par \par Her work up was as follows: \par 2020 -- b/l screening mammogram \par -heterogenously dense breasts\par -calcifications in retroareolar R breast \par -no suspicious mass, microcalcifications or architectural distortion in L breast \par BIRADS 0 \par \par 2020 -- R dx mammogram \par -heterogenously dense breasts\par -oval circumscribed mass with calcifications in retroareolar R breast, measures 0.4 cm \par R US \par -@9N3, round circumscribed mass, measuring 3 x 2 x 2 mm\par BIRADS 4 \par \par 3/6/2020 -- repeat R dx mammogram and US \par -@9N3, fluctuating cyst, slightly decreased in size measuring 2 x 2 x 2 mm \par BIRADS 3 \par \par 2020 -- R dx mammogram and US \par -heterogenously dense breasts\par -change in calcifications in retroareolar R breast --> BIOPSY \par R US \par -@9N3, previously visualized mass is no longer seen \par BIRADS 4\par \par 10/1/2020 -- R stereotactic guided biopsy (tophat)\par -intraductal papilloma \par \par She has no breast related complaints at this time.  She denies any breast pain, has not palpated any new palpable masses in either breast and denies any nipple discharge or retraction.  \par \par HISTORICAL RISK FACTORS: \par -no prior breast biopsies or surgeries \par -no family history of breast or ovarian cancer \par -, age at first live birth was 28 \par -prior OCP use \par -no gyn surgeries\par \par INTERVAL HISTORY: \par Imtiaz returns for her follow up visit, s/p R WLE on 2021. \par \par Her final pathology revealed an atypical papilloma with atypical ductal hyperplasia.  \par Her risk assessment is as follows: \par RISK ASSESSMENT: \par Tanja\par 5yr -- 3.3%\par lifetime -- 16.2%\par \par TC v6 \par 5yr -- 5.3%\par lifetime -- 20.6%\par \par INTERVAL HISTORY:\par 2021 \julissa Kitchen returns for a 6 month follow up for benign intermediate to high risk disease 2/2 R intraductal papilloma and atypical ductal hyperplasia. \par \par She has no breast related complaints at this time.  She denies any breast pain, has not palpated any new palpable masses in either breast and denies any nipple discharge or retraction.  \par \par Her most recent imaging on 21 which revealed, postsurgical architectural distortion in the right breast, and on R breast US, @12:00 N5, she has an oval circumscribed mass measuring 0.4 x 0.4 x 0.3 cm favored to be simple cyst, deemed BI-RADS Category 2: Benign. \par \par INTERVAL HISTORY: \par 3/2/2022\julissa Kitchen returns for a short term follow up, now with a new diagnosis of a MR detected RIGHT breast invasive mammary carcinoma, [id number], ER/MA pos, her 2 PENDING, anatomic stage IIB, AJCC 8th edition. \par \par She called the office with two episodes of RIGHT bloody nipple discharge.  The first time was at the end of August, no testing was performed at this time as she had normal imaging one month prior.  She had a second episode of right bloody nipple discharge, but a much larger amount, and at this time she was sent for the following diagnostic imaging.  \par \par She has not had any further episodes of bloody nipple discharge and has not palpated any abnormal masses within either breast and otherwise healed well from her surgery. \par \par 2021 -- R dx mammogram and US \par -heterogenously dense breasts\par -architectural distortion in the RA region from recent surgery.  \par -scattered coarse benign appearing calcifications \par -no suspicious mass, calcs, or areas of architectural distortion \par R US \par -no solid or cystic lesion seen \par BIRADS 2 -- rec BREAST MRI \par \par 2021 -- MR breast \par RIGHT: \par -extensive nonmass enhancement in R superior breast with areas of clumped irregular enhancement, spans 5.4 x 5.3 x 7.8 cm --> BIOPSY medial and lateral aspects \par -no lymphadenopathy \par LEFT: \par -scattered foci of nonspecific enhancement\par -enlarged L axillary LN consistent with recent vaccine history \par BIRADS 4\par \par 2022 -- R MR guided biopsy x 2 \par 1. R MR Bx, lateral aspect of enhancement, hourglass clip \par -invasive mammary carcinoma with both tubular and lobular features \par -DCIS, intermediate nuclear grade \par -focal ALH \par -ER/MA pos (Michael 8), Her 2 PENDING\par -Ki 67: 3-5%\par \par 2. R MR Bx, medial aspect of enhancement, minicork clip was placed but was extruded after the biopsy \par -invasive mammary carcinoma with both tubular and lobular features \par -DCIS, intermediate nuclear grade \par -focal ALH \par -ER/MA pos (Michael 8/8), Her 2 PENDING\par -Ki 67: 7-10%\par \par 2022 -- L screening mammogram \par -heterogenously dense breasts\par -stable calcifications in L lateral breast, unchanged from 2018, c/w benign etiology \par -stable nodular asymmetries in L, unchanged from 2018 \par -no suspicious mass, calcs, architectural distortion in L \par BIRADS 2

## 2022-03-02 NOTE — PAST MEDICAL HISTORY
[Menarche Age ____] : age at menarche was [unfilled] [Menopause Age____] : age at menopause was [unfilled] [Total Preg ___] : G[unfilled] [Live Births ___] : P[unfilled]  [Age At Live Birth ___] : Age at live birth: [unfilled] [FreeTextEntry5] : denies [History of Hormone Replacement Treatment] : has no history of hormone replacement treatment [FreeTextEntry6] : denies [FreeTextEntry7] : yes in past x 1 year  [FreeTextEntry8] : denies

## 2022-03-02 NOTE — REVIEW OF SYSTEMS
[As Noted in HPI] : as noted in HPI [Negative] : Heme/Lymph [Fever] : no fever [Chills] : no chills [Abn Vaginal Bleeding] : no unexplained vaginal bleeding [Skin Lesions] : no skin lesions [Skin Wound] : no skin wound [Breast Lump] : no breast lump [Breast Pain] : no breast pain [Hot Flashes] : no hot flashes

## 2022-03-03 ENCOUNTER — NON-APPOINTMENT (OUTPATIENT)
Age: 62
End: 2022-03-03

## 2022-03-08 ENCOUNTER — NON-APPOINTMENT (OUTPATIENT)
Age: 62
End: 2022-03-08

## 2022-03-09 ENCOUNTER — NON-APPOINTMENT (OUTPATIENT)
Age: 62
End: 2022-03-09

## 2022-03-10 ENCOUNTER — NON-APPOINTMENT (OUTPATIENT)
Age: 62
End: 2022-03-10

## 2022-03-17 ENCOUNTER — NON-APPOINTMENT (OUTPATIENT)
Age: 62
End: 2022-03-17

## 2022-03-23 ENCOUNTER — APPOINTMENT (OUTPATIENT)
Dept: PLASTIC SURGERY | Facility: CLINIC | Age: 62
End: 2022-03-23
Payer: MEDICARE

## 2022-03-23 VITALS
DIASTOLIC BLOOD PRESSURE: 88 MMHG | BODY MASS INDEX: 32.98 KG/M2 | SYSTOLIC BLOOD PRESSURE: 164 MMHG | WEIGHT: 168 LBS | TEMPERATURE: 97.2 F | HEIGHT: 60 IN

## 2022-03-23 PROCEDURE — 99204 OFFICE O/P NEW MOD 45 MIN: CPT

## 2022-03-24 ENCOUNTER — NON-APPOINTMENT (OUTPATIENT)
Age: 62
End: 2022-03-24

## 2022-03-24 NOTE — ASSESSMENT
[FreeTextEntry1] : 62 y/o F with h/o right breast intraductal papilloma s/p R WLE on 1/22/21, now with recent MR detected Stage IIB RIGHT breast invasive mammary carcinoma ([id number], ER+/AK+/Her2+, 7.8 cm superior pole), recommended to undergo skin-sparing mastectomy, which patient has confirmed is her desired oncologic plan.\par \par She is a good candidate for right breast immediate reconstruction ([facility]) with direct-to-silicone implant and Alloderm via SSM approach, possible tissue expander, and concurrent left breast conservative reduction/mastopexy for symmetry (~50-100g).\par \par The patient was counseled about reconstructive options following mastectomy, including autologous, prosthetic, and the options of foregoing reconstruction. Additionally, she was explained the options regarding the timing of reconstruction, including immediate reconstruction at the time of mastectomy or delayed reconstruction at a later date. \par \par The patient was extensively counseled on the operation and the perioperative recoveries of both autologous and prosthetic reconstructions. The patient understands the likely position of scars and the use of surgical drains. The patient understands the risks with abdominally based microsurgical reconstruction including partial or total flap loss, revisit to the operating room in the berto-operative period, wound dehiscence, mastectomy skin flap necrosis, fat necrosis, abdominal wall morbidity (laxity, bulge, hernia), asymmetry, paresthesia, seroma, hematoma, and infection. She also understands the risks with implant-based reconstruction include but not limited to hematoma, seroma, infection, implant malposition, extrusion, deflation, capsular contracture, mastectomy skin flap necrosis, wound dehiscence, asymmetry, paresthesia, small risk of breast-implant associated lymphoma, likelihood of requiring additional procedures related to the implant over the course of her lifetime, possible need for additional surgery including revision and/or autologous tissue reconstruction (e.g. pedicled latissimus dorsi flap, TDAP flap, etc). She was also informed on the off-label use of biologic mesh, its benefits, risks and alternatives. She was given the opportunity to ask questions; and all were answered to her satisfaction at this time.\par \par Regarding the mastopexy, the risks include but are not limited to bleeding, infection, seroma, hematoma, wound separation or poor wound healing, tissue ischemia/necrosis of skin flap or NAC, scarring in particular hypertrophic or keloid scarring, breast asymmetry, need for additional surgery, loss of NAC sensation, no improvement of neck pain, and dissatisfaction with outcome.\par \par Reviewed increased risk of wound healing complications with history of UC and use of mesalamine.  Hold mesalamine berto-op unless otherwise contraindicated.\par \par I reviewed with the patient the location of incisional scars, possible use of surgical drain, need to wear surgical support bra post-operatively, and no post-operative heavy lifting.\par \par She is a good candidate for prosthetic breast reconstruction. After a long discussion she has decided to proceed with the recommended surgery. She understands that if a tissue expander is placed, that she will undergo serial expansion followed by staged expander exchange to silicone implant.\par \par The patient understands all of these risks and she provides informed consent. [employer] [place] [employer] [place] patient checklist reviewed with patient in detail. All questions were answered to patient and daughter's apparent satisfaction.\par \par Photos were taken with patient permission.\par \par Follow up in 1 week for FDA checklist review and implant review (volume)\par \par Her surgery date with be coordinated by Breast Surgery and Plastic Surgery.\par \par Due to COVID-19, pre-visit patient instructions were explained to the patient and their symptoms were checked upon arrival. Masks were used by the healthcare provider and staff and the examination room was cleaned after the patient visit concluded

## 2022-03-24 NOTE — PHYSICAL EXAM
[de-identified] : well-appearing, NAD [de-identified] : EOMI [de-identified] : nonlabored breathing\par no gross truncal asymmetry [de-identified] : Bilateral breasts mild volume asymmetry (L>R) and Grade III ptosis, superior>inferior pole deflation BL\par Bilateral pre-axillary folds, R>L\par Left breast: no palpable masses, nipple retraction or discharge.\par Right breast: superior pole 8 cm nondiscrete mass; no discrete palpable masses, nipple retraction or discharge. Well healed scar superolateral periareolar region\par Axilla: no clinically palpable lymph adenopathy bilateral\par Chest: no trunk deformities\par Palpable LD muscle with moderate minimal soft tissue bulk, bilateral\par \par Breast measurements (standing; (cm)):\par Regnault ptosis grade: III\par R SN-Nipple: 31\par R Nipple-IMF: 13\par R Base width: 14\par R Nipple - midsternum: 9\par R NAC diameter: 7.5\par \par IMF position asymmetrical, left 1 cm lower than right breast\par \par L SN-Nipple: 31\par L Nipple-IMF: 13\par L Base width: 14\par L Nipple - midsternum: 10\par L NAC diameter: 7 [de-identified] : well-healed vertical infraumbilical scar s/p appy and , protuberant abdomen (visceral > truncal wall adiposity) [de-identified] : FROM

## 2022-03-24 NOTE — HISTORY OF PRESENT ILLNESS
[FreeTextEntry1] : 60 yo  F /WT survivor with PMHx with NIDDM (on metformin), HLD, UC (on mesalamine), hypothyroidism and right breast intraductal papilloma s/p R WLE on 21, now with recent MR detected Stage IIB RIGHT breast invasive mammary carcinoma ([id number], ER+/OH+/Her2+, 7.8 cm superior pole).  Per Breast Surgeon (Dr. Irina Rodriguez), she is not a great candidate for BCS for 7.8 cm cancer and she was offered Right mastectomy. Pt desires mastectomy. She is now referred by her Breast Surgeon - Dr. Irina Rodriguez for discussion of reconstruction options.\par \par No prior breastfeeding history\par Current bra size: 42C\par Desired reconstructive volume: would like to be smaller and lifted\par C/o heavy breasts a/w shoulder pain and brassiere grooving\par \par Non-smoker\par No personal or family h/o DVT and MRSA \par Ht 5'0" Wt 168 lb BMI 32.8%\par Last Hb a1c 7.4% 2021\par Here today with her daughter, who she lives with\par Daughter is an RN

## 2022-03-25 ENCOUNTER — NON-APPOINTMENT (OUTPATIENT)
Age: 62
End: 2022-03-25

## 2022-03-28 ENCOUNTER — NON-APPOINTMENT (OUTPATIENT)
Age: 62
End: 2022-03-28

## 2022-03-29 ENCOUNTER — NON-APPOINTMENT (OUTPATIENT)
Age: 62
End: 2022-03-29

## 2022-03-30 ENCOUNTER — NON-APPOINTMENT (OUTPATIENT)
Age: 62
End: 2022-03-30

## 2022-03-31 ENCOUNTER — RESULT REVIEW (OUTPATIENT)
Age: 62
End: 2022-03-31

## 2022-03-31 ENCOUNTER — OUTPATIENT (OUTPATIENT)
Dept: OUTPATIENT SERVICES | Facility: HOSPITAL | Age: 62
LOS: 1 days | Discharge: HOME | End: 2022-03-31
Payer: MEDICARE

## 2022-03-31 VITALS
DIASTOLIC BLOOD PRESSURE: 67 MMHG | HEART RATE: 91 BPM | WEIGHT: 167.99 LBS | RESPIRATION RATE: 18 BRPM | HEIGHT: 60 IN | OXYGEN SATURATION: 96 % | TEMPERATURE: 99 F | SYSTOLIC BLOOD PRESSURE: 149 MMHG

## 2022-03-31 DIAGNOSIS — Z98.890 OTHER SPECIFIED POSTPROCEDURAL STATES: Chronic | ICD-10-CM

## 2022-03-31 DIAGNOSIS — Z01.818 ENCOUNTER FOR OTHER PREPROCEDURAL EXAMINATION: ICD-10-CM

## 2022-03-31 DIAGNOSIS — Z98.891 HISTORY OF UTERINE SCAR FROM PREVIOUS SURGERY: Chronic | ICD-10-CM

## 2022-03-31 DIAGNOSIS — C50.811 MALIGNANT NEOPLASM OF OVERLAPPING SITES OF RIGHT FEMALE BREAST: ICD-10-CM

## 2022-03-31 DIAGNOSIS — Z90.89 ACQUIRED ABSENCE OF OTHER ORGANS: Chronic | ICD-10-CM

## 2022-03-31 DIAGNOSIS — Z90.49 ACQUIRED ABSENCE OF OTHER SPECIFIED PARTS OF DIGESTIVE TRACT: Chronic | ICD-10-CM

## 2022-03-31 DIAGNOSIS — D36.9 BENIGN NEOPLASM, UNSPECIFIED SITE: Chronic | ICD-10-CM

## 2022-03-31 LAB
A1C WITH ESTIMATED AVERAGE GLUCOSE RESULT: 8 % — HIGH (ref 4–5.6)
ALBUMIN SERPL ELPH-MCNC: 4.7 G/DL — SIGNIFICANT CHANGE UP (ref 3.5–5.2)
ALP SERPL-CCNC: 82 U/L — SIGNIFICANT CHANGE UP (ref 30–115)
ALT FLD-CCNC: 19 U/L — SIGNIFICANT CHANGE UP (ref 0–41)
ANION GAP SERPL CALC-SCNC: 18 MMOL/L — HIGH (ref 7–14)
APPEARANCE UR: CLEAR — SIGNIFICANT CHANGE UP
APTT BLD: 29 SEC — SIGNIFICANT CHANGE UP (ref 27–39.2)
AST SERPL-CCNC: 18 U/L — SIGNIFICANT CHANGE UP (ref 0–41)
BASOPHILS # BLD AUTO: 0.06 K/UL — SIGNIFICANT CHANGE UP (ref 0–0.2)
BASOPHILS NFR BLD AUTO: 0.7 % — SIGNIFICANT CHANGE UP (ref 0–1)
BILIRUB SERPL-MCNC: 0.3 MG/DL — SIGNIFICANT CHANGE UP (ref 0.2–1.2)
BILIRUB UR-MCNC: NEGATIVE — SIGNIFICANT CHANGE UP
BUN SERPL-MCNC: 15 MG/DL — SIGNIFICANT CHANGE UP (ref 10–20)
CALCIUM SERPL-MCNC: 10 MG/DL — SIGNIFICANT CHANGE UP (ref 8.5–10.1)
CHLORIDE SERPL-SCNC: 99 MMOL/L — SIGNIFICANT CHANGE UP (ref 98–110)
CO2 SERPL-SCNC: 23 MMOL/L — SIGNIFICANT CHANGE UP (ref 17–32)
COLOR SPEC: YELLOW — SIGNIFICANT CHANGE UP
CREAT SERPL-MCNC: 0.7 MG/DL — SIGNIFICANT CHANGE UP (ref 0.7–1.5)
DIFF PNL FLD: NEGATIVE — SIGNIFICANT CHANGE UP
EGFR: 98 ML/MIN/1.73M2 — SIGNIFICANT CHANGE UP
EOSINOPHIL # BLD AUTO: 0.2 K/UL — SIGNIFICANT CHANGE UP (ref 0–0.7)
EOSINOPHIL NFR BLD AUTO: 2.2 % — SIGNIFICANT CHANGE UP (ref 0–8)
ESTIMATED AVERAGE GLUCOSE: 183 MG/DL — HIGH (ref 68–114)
GLUCOSE SERPL-MCNC: 121 MG/DL — HIGH (ref 70–99)
GLUCOSE UR QL: NEGATIVE — SIGNIFICANT CHANGE UP
HCT VFR BLD CALC: 39.1 % — SIGNIFICANT CHANGE UP (ref 37–47)
HGB BLD-MCNC: 12.6 G/DL — SIGNIFICANT CHANGE UP (ref 12–16)
IMM GRANULOCYTES NFR BLD AUTO: 0.3 % — SIGNIFICANT CHANGE UP (ref 0.1–0.3)
INR BLD: 1.03 RATIO — SIGNIFICANT CHANGE UP (ref 0.65–1.3)
KETONES UR-MCNC: NEGATIVE — SIGNIFICANT CHANGE UP
LEUKOCYTE ESTERASE UR-ACNC: NEGATIVE — SIGNIFICANT CHANGE UP
LYMPHOCYTES # BLD AUTO: 3.16 K/UL — SIGNIFICANT CHANGE UP (ref 1.2–3.4)
LYMPHOCYTES # BLD AUTO: 35.2 % — SIGNIFICANT CHANGE UP (ref 20.5–51.1)
MCHC RBC-ENTMCNC: 28.1 PG — SIGNIFICANT CHANGE UP (ref 27–31)
MCHC RBC-ENTMCNC: 32.2 G/DL — SIGNIFICANT CHANGE UP (ref 32–37)
MCV RBC AUTO: 87.3 FL — SIGNIFICANT CHANGE UP (ref 81–99)
MONOCYTES # BLD AUTO: 0.64 K/UL — HIGH (ref 0.1–0.6)
MONOCYTES NFR BLD AUTO: 7.1 % — SIGNIFICANT CHANGE UP (ref 1.7–9.3)
NEUTROPHILS # BLD AUTO: 4.88 K/UL — SIGNIFICANT CHANGE UP (ref 1.4–6.5)
NEUTROPHILS NFR BLD AUTO: 54.5 % — SIGNIFICANT CHANGE UP (ref 42.2–75.2)
NITRITE UR-MCNC: NEGATIVE — SIGNIFICANT CHANGE UP
NRBC # BLD: 0 /100 WBCS — SIGNIFICANT CHANGE UP (ref 0–0)
PH UR: 7 — SIGNIFICANT CHANGE UP (ref 5–8)
PLATELET # BLD AUTO: 373 K/UL — SIGNIFICANT CHANGE UP (ref 130–400)
POTASSIUM SERPL-MCNC: 4.7 MMOL/L — SIGNIFICANT CHANGE UP (ref 3.5–5)
POTASSIUM SERPL-SCNC: 4.7 MMOL/L — SIGNIFICANT CHANGE UP (ref 3.5–5)
PROT SERPL-MCNC: 7.4 G/DL — SIGNIFICANT CHANGE UP (ref 6–8)
PROT UR-MCNC: SIGNIFICANT CHANGE UP
PROTHROM AB SERPL-ACNC: 11.8 SEC — SIGNIFICANT CHANGE UP (ref 9.95–12.87)
RBC # BLD: 4.48 M/UL — SIGNIFICANT CHANGE UP (ref 4.2–5.4)
RBC # FLD: 12.4 % — SIGNIFICANT CHANGE UP (ref 11.5–14.5)
SODIUM SERPL-SCNC: 140 MMOL/L — SIGNIFICANT CHANGE UP (ref 135–146)
SP GR SPEC: 1.02 — SIGNIFICANT CHANGE UP (ref 1.01–1.03)
UROBILINOGEN FLD QL: SIGNIFICANT CHANGE UP
WBC # BLD: 8.97 K/UL — SIGNIFICANT CHANGE UP (ref 4.8–10.8)
WBC # FLD AUTO: 8.97 K/UL — SIGNIFICANT CHANGE UP (ref 4.8–10.8)

## 2022-03-31 PROCEDURE — 93010 ELECTROCARDIOGRAM REPORT: CPT

## 2022-03-31 PROCEDURE — 71046 X-RAY EXAM CHEST 2 VIEWS: CPT | Mod: 26

## 2022-03-31 RX ORDER — FAMOTIDINE 10 MG/ML
1 INJECTION INTRAVENOUS
Qty: 0 | Refills: 0 | DISCHARGE

## 2022-03-31 RX ORDER — MESALAMINE 400 MG
1 TABLET, DELAYED RELEASE (ENTERIC COATED) ORAL
Qty: 0 | Refills: 0 | DISCHARGE

## 2022-03-31 NOTE — H&P PST ADULT - HISTORY OF PRESENT ILLNESS
CURRENTLY  DENIES ANY CP, SOB, PALPITATIONS, COUGH OR DYSURIA  EXERCISE TOLERANCE 3 FOS WITHOUT shortness of breath    AS PER PATIENT  this is his/her complete medical history including medications - PRESCRIPTIONS  OVER THE COUNTER MEDS    pt denies any covid s/s, or tested positive in the past.  Received covid vaccine  pt advised self quarantine till day of procedure    Anesthesia Alert  YES--Difficult Airway CLASS IV  NO--History of neck surgery or radiation  NO--Limited ROM of neck  NO--History of Malignant hyperthermia  NO--No personal or family history of Pseudocholinesterase deficiency.  NO--Prior Anesthesia Complication  NO--Latex Allergy  NO--Loose teeth  NO--History of Rheumatoid Arthritis  NO--NEY  NO--Bleeding risk  NO--Other_____    Patient verbalized understanding of instructions and was given the opportunity to ask questions and have them answered.

## 2022-03-31 NOTE — H&P PST ADULT - NSICDXPASTMEDICALHX_GEN_ALL_CORE_FT
PAST MEDICAL HISTORY:  Acid reflux     Breast cancer RIGHT    Diabetes     High cholesterol     Hypothyroid     Ulcerative colitis

## 2022-03-31 NOTE — H&P PST ADULT - NSICDXPASTSURGICALHX_GEN_ALL_CORE_FT
PAST SURGICAL HISTORY:  H/O breast biopsy     H/O  section     H/O dilation and curettage     History of appendectomy     History of tonsillectomy     Intraductal papilloma

## 2022-03-31 NOTE — H&P PST ADULT - REASON FOR ADMISSION
60 Y/O F SCHEDULED FOR PAST FOR : RIGHT BREAST SKIN SPARING MASTECTOMY, SENTINEL NODE MAPPING AND BIOPSY, POSSIBLE AXILLARY NODE DISSECTION WITH DR DANIELS AND RIGHT BREAST RECONSTRUCTION ([facility]) WITH DIRECT TO SILICONE IMPLANT, POSSIBLE TISSUE EXPANDER AND ALLODERM, LEFT MASTOPEXY/REDUCTION FOR SYMMETRY WITH DR TALAMANTES ON 4/13/22 UNDER [facility]. PT REPORTS DIAGNOSIS OF RIGHT BREAST CANCER.

## 2022-04-01 ENCOUNTER — APPOINTMENT (OUTPATIENT)
Dept: PLASTIC SURGERY | Facility: CLINIC | Age: 62
End: 2022-04-01
Payer: MEDICARE

## 2022-04-01 PROCEDURE — 99213 OFFICE O/P EST LOW 20 MIN: CPT

## 2022-04-01 NOTE — PHYSICAL EXAM
[de-identified] : well-appearing, NAD [de-identified] : EOMI [de-identified] : nonlabored breathing\par no gross truncal asymmetry [de-identified] : Bilateral breasts mild volume asymmetry (L>R) and Grade III ptosis, superior>inferior pole deflation BL\par Bilateral pre-axillary folds, R>L\par Left breast: no palpable masses, nipple retraction or discharge.\par Right breast: superior pole 8 cm nondiscrete mass; no discrete palpable masses, nipple retraction or discharge. Well healed scar superolateral periareolar region\par Axilla: no clinically palpable lymph adenopathy bilateral\par Chest: no trunk deformities\par Palpable LD muscle with moderate minimal soft tissue bulk, bilateral\par \par Breast measurements (standing; (cm)):\par Regnault ptosis grade: III\par R SN-Nipple: 31\par R Nipple-IMF: 13\par R Base width: 14\par R Nipple - midsternum: 9\par R NAC diameter: 7.5\par \par IMF position asymmetrical, left 1 cm lower than right breast\par \par L SN-Nipple: 31\par L Nipple-IMF: 13\par L Base width: 14\par L Nipple - midsternum: 10\par L NAC diameter: 7 [de-identified] : well-healed vertical infraumbilical scar s/p appy and , protuberant abdomen (visceral > truncal wall adiposity) [de-identified] : FROM

## 2022-04-01 NOTE — ASSESSMENT
[FreeTextEntry1] : 60 y/o F with h/o right breast intraductal papilloma s/p R WLE on 1/22/21, now with recent MR detected Stage IIB RIGHT breast invasive mammary carcinoma ([id number], ER+/MI+/Her2+, 7.8 cm superior pole), recommended to undergo skin-sparing mastectomy, which patient has confirmed is her desired oncologic plan.\par \par She is a good candidate for right breast immediate reconstruction ([facility]) with direct-to-silicone implant and Alloderm via SSM approach, possible tissue expander, and concurrent left breast conservative reduction/mastopexy for symmetry (~100-200g). \par \par Patient and daughter understood that intraoperative factors to determine DTI volume.  will consider 400-590 ml silicone implant. \par \par The patient was counseled about reconstructive options following mastectomy, including autologous, prosthetic, and the options of foregoing reconstruction. Additionally, she was explained the options regarding the timing of reconstruction, including immediate reconstruction at the time of mastectomy or delayed reconstruction at a later date. \par \par The patient was extensively counseled on the operation and the perioperative recoveries of both autologous and prosthetic reconstructions. The patient understands the likely position of scars and the use of surgical drains. The patient understands the risks with abdominally based microsurgical reconstruction including partial or total flap loss, revisit to the operating room in the berto-operative period, wound dehiscence, mastectomy skin flap necrosis, fat necrosis, abdominal wall morbidity (laxity, bulge, hernia), asymmetry, paresthesia, seroma, hematoma, and infection. She also understands the risks with implant-based reconstruction include but not limited to hematoma, seroma, infection, implant malposition, extrusion, deflation, capsular contracture, mastectomy skin flap necrosis, wound dehiscence, asymmetry, paresthesia, small risk of breast-implant associated lymphoma, likelihood of requiring additional procedures related to the implant over the course of her lifetime, possible need for additional surgery including revision and/or autologous tissue reconstruction (e.g. pedicled latissimus dorsi flap, TDAP flap, etc). She was also informed on the off-label use of biologic mesh, its benefits, risks and alternatives. She was given the opportunity to ask questions; and all were answered to her satisfaction at this time.\par \par Regarding the mastopexy, the risks include but are not limited to bleeding, infection, seroma, hematoma, wound separation or poor wound healing, tissue ischemia/necrosis of skin flap or NAC, scarring in particular hypertrophic or keloid scarring, breast asymmetry, need for additional surgery, loss of NAC sensation, no improvement of neck pain, and dissatisfaction with outcome.\par \par Reviewed increased risk of wound healing complications with history of UC and use of mesalamine.  Hold mesalamine berto-op unless otherwise contraindicated.  Currently off mesalamine.\par \par I reviewed with the patient the location of incisional scars, possible use of surgical drain, need to wear surgical support bra post-operatively, and no post-operative heavy lifting.\par \par She is a good candidate for prosthetic breast reconstruction. After a long discussion she has decided to proceed with the recommended surgery. She understands that if a tissue expander is placed, that she will undergo serial expansion followed by staged expander exchange to silicone implant.\par \par The patient understands all of these risks and she provides informed consent. [facility] patient checklist reviewed with patient in detail. All questions were answered to patient and daughter's apparent satisfaction.  Patient initials and signatures obtained as indicated and countersigned by MD.  Checklist copy provided to patient is on file.\par \par Photos were taken with patient permission at last visit.\par \par Follow up in 1 week for FDA checklist review and implant review (volume)\par \par Her surgery date with be coordinated by Breast Surgery and Plastic Surgery.\par \par Due to COVID-19, pre-visit patient instructions were explained to the patient and their symptoms were checked upon arrival. Masks were used by the healthcare provider and staff and the examination room was cleaned after the patient visit concluded

## 2022-04-04 ENCOUNTER — NON-APPOINTMENT (OUTPATIENT)
Age: 62
End: 2022-04-04

## 2022-04-06 DIAGNOSIS — G89.18 OTHER ACUTE POSTPROCEDURAL PAIN: ICD-10-CM

## 2022-04-06 DIAGNOSIS — M79.2 NEURALGIA AND NEURITIS, UNSPECIFIED: ICD-10-CM

## 2022-04-06 PROBLEM — C50.919 MALIGNANT NEOPLASM OF UNSPECIFIED SITE OF UNSPECIFIED FEMALE BREAST: Chronic | Status: ACTIVE | Noted: 2022-03-31

## 2022-04-07 ENCOUNTER — NON-APPOINTMENT (OUTPATIENT)
Age: 62
End: 2022-04-07

## 2022-04-10 ENCOUNTER — LABORATORY RESULT (OUTPATIENT)
Age: 62
End: 2022-04-10

## 2022-04-12 ENCOUNTER — RESULT REVIEW (OUTPATIENT)
Age: 62
End: 2022-04-12

## 2022-04-12 ENCOUNTER — OUTPATIENT (OUTPATIENT)
Dept: OUTPATIENT SERVICES | Facility: HOSPITAL | Age: 62
LOS: 1 days | Discharge: HOME | End: 2022-04-12
Payer: MEDICARE

## 2022-04-12 DIAGNOSIS — Z98.891 HISTORY OF UTERINE SCAR FROM PREVIOUS SURGERY: Chronic | ICD-10-CM

## 2022-04-12 DIAGNOSIS — Z90.89 ACQUIRED ABSENCE OF OTHER ORGANS: Chronic | ICD-10-CM

## 2022-04-12 DIAGNOSIS — Z90.49 ACQUIRED ABSENCE OF OTHER SPECIFIED PARTS OF DIGESTIVE TRACT: Chronic | ICD-10-CM

## 2022-04-12 DIAGNOSIS — Z98.890 OTHER SPECIFIED POSTPROCEDURAL STATES: Chronic | ICD-10-CM

## 2022-04-12 DIAGNOSIS — D36.9 BENIGN NEOPLASM, UNSPECIFIED SITE: Chronic | ICD-10-CM

## 2022-04-12 DIAGNOSIS — Z85.3 PERSONAL HISTORY OF MALIGNANT NEOPLASM OF BREAST: ICD-10-CM

## 2022-04-12 PROCEDURE — 78195 LYMPH SYSTEM IMAGING: CPT | Mod: 26

## 2022-04-12 NOTE — ASU PATIENT PROFILE, ADULT - FALL HARM RISK - UNIVERSAL INTERVENTIONS
Bed in lowest position, wheels locked, appropriate side rails in place/Call bell, personal items and telephone in reach/Instruct patient to call for assistance before getting out of bed or chair/Non-slip footwear when patient is out of bed/[place] to call system/Physically safe environment - no spills, clutter or unnecessary equipment/Purposeful Proactive Rounding/Room/bathroom lighting operational, light cord in reach

## 2022-04-13 ENCOUNTER — OUTPATIENT (OUTPATIENT)
Dept: INPATIENT UNIT | Facility: HOSPITAL | Age: 62
LOS: 1 days | Discharge: HOME | End: 2022-04-13
Payer: MEDICARE

## 2022-04-13 ENCOUNTER — APPOINTMENT (OUTPATIENT)
Dept: PLASTIC SURGERY | Facility: HOSPITAL | Age: 62
End: 2022-04-13
Payer: MEDICARE

## 2022-04-13 ENCOUNTER — TRANSCRIPTION ENCOUNTER (OUTPATIENT)
Age: 62
End: 2022-04-13

## 2022-04-13 ENCOUNTER — RESULT REVIEW (OUTPATIENT)
Age: 62
End: 2022-04-13

## 2022-04-13 ENCOUNTER — APPOINTMENT (OUTPATIENT)
Dept: BREAST CENTER | Facility: HOSPITAL | Age: 62
End: 2022-04-13
Payer: MEDICARE

## 2022-04-13 VITALS
RESPIRATION RATE: 18 BRPM | HEART RATE: 94 BPM | DIASTOLIC BLOOD PRESSURE: 66 MMHG | SYSTOLIC BLOOD PRESSURE: 128 MMHG | OXYGEN SATURATION: 95 %

## 2022-04-13 VITALS
DIASTOLIC BLOOD PRESSURE: 74 MMHG | SYSTOLIC BLOOD PRESSURE: 139 MMHG | HEART RATE: 82 BPM | WEIGHT: 167.99 LBS | TEMPERATURE: 99 F | OXYGEN SATURATION: 96 %

## 2022-04-13 DIAGNOSIS — Z90.49 ACQUIRED ABSENCE OF OTHER SPECIFIED PARTS OF DIGESTIVE TRACT: Chronic | ICD-10-CM

## 2022-04-13 DIAGNOSIS — Z98.891 HISTORY OF UTERINE SCAR FROM PREVIOUS SURGERY: Chronic | ICD-10-CM

## 2022-04-13 DIAGNOSIS — Z98.890 OTHER SPECIFIED POSTPROCEDURAL STATES: Chronic | ICD-10-CM

## 2022-04-13 DIAGNOSIS — D36.9 BENIGN NEOPLASM, UNSPECIFIED SITE: Chronic | ICD-10-CM

## 2022-04-13 DIAGNOSIS — Z90.89 ACQUIRED ABSENCE OF OTHER ORGANS: Chronic | ICD-10-CM

## 2022-04-13 LAB
ABO RH CONFIRMATION: SIGNIFICANT CHANGE UP
BLD GP AB SCN SERPL QL: SIGNIFICANT CHANGE UP
GLUCOSE BLDC GLUCOMTR-MCNC: 106 MG/DL — HIGH (ref 70–99)

## 2022-04-13 PROCEDURE — 88342 IMHCHEM/IMCYTCHM 1ST ANTB: CPT | Mod: 26

## 2022-04-13 PROCEDURE — 88341 IMHCHEM/IMCYTCHM EA ADD ANTB: CPT | Mod: 26

## 2022-04-13 PROCEDURE — 19340 INSJ BREAST IMPLT SM D MAST: CPT | Mod: RT

## 2022-04-13 PROCEDURE — 19316 MASTOPEXY: CPT | Mod: LT,59

## 2022-04-13 PROCEDURE — 71045 X-RAY EXAM CHEST 1 VIEW: CPT | Mod: 26

## 2022-04-13 PROCEDURE — 19303 MAST SIMPLE COMPLETE: CPT | Mod: RT

## 2022-04-13 PROCEDURE — 15777 ACELLULAR DERM MATRIX IMPLT: CPT | Mod: RT

## 2022-04-13 PROCEDURE — 88302 TISSUE EXAM BY PATHOLOGIST: CPT | Mod: 26

## 2022-04-13 PROCEDURE — 38900 IO MAP OF SENT LYMPH NODE: CPT | Mod: RT

## 2022-04-13 PROCEDURE — 88305 TISSUE EXAM BY PATHOLOGIST: CPT | Mod: 26

## 2022-04-13 PROCEDURE — 88307 TISSUE EXAM BY PATHOLOGIST: CPT | Mod: 26

## 2022-04-13 PROCEDURE — 38525 BIOPSY/REMOVAL LYMPH NODES: CPT | Mod: RT

## 2022-04-13 RX ORDER — HEPARIN SODIUM 5000 [USP'U]/ML
5000 INJECTION INTRAVENOUS; SUBCUTANEOUS ONCE
Refills: 0 | Status: COMPLETED | OUTPATIENT
Start: 2022-04-13 | End: 2022-04-13

## 2022-04-13 RX ORDER — TRAMADOL HYDROCHLORIDE 50 MG/1
1 TABLET ORAL
Qty: 20 | Refills: 0
Start: 2022-04-13 | End: 2022-04-17

## 2022-04-13 RX ORDER — ONDANSETRON 8 MG/1
1 TABLET, FILM COATED ORAL
Qty: 21 | Refills: 0
Start: 2022-04-13 | End: 2022-04-19

## 2022-04-13 RX ORDER — PANTOPRAZOLE SODIUM 20 MG/1
1 TABLET, DELAYED RELEASE ORAL
Qty: 0 | Refills: 0 | DISCHARGE

## 2022-04-13 RX ORDER — SODIUM CHLORIDE 9 MG/ML
1000 INJECTION, SOLUTION INTRAVENOUS
Refills: 0 | Status: DISCONTINUED | OUTPATIENT
Start: 2022-04-13 | End: 2022-04-27

## 2022-04-13 RX ORDER — ONDANSETRON 8 MG/1
4 TABLET, FILM COATED ORAL ONCE
Refills: 0 | Status: DISCONTINUED | OUTPATIENT
Start: 2022-04-13 | End: 2022-04-27

## 2022-04-13 RX ORDER — MORPHINE SULFATE 50 MG/1
4 CAPSULE, EXTENDED RELEASE ORAL
Refills: 0 | Status: DISCONTINUED | OUTPATIENT
Start: 2022-04-13 | End: 2022-04-13

## 2022-04-13 RX ADMIN — MORPHINE SULFATE 4 MILLIGRAM(S): 50 CAPSULE, EXTENDED RELEASE ORAL at 12:53

## 2022-04-13 RX ADMIN — HEPARIN SODIUM 5000 UNIT(S): 5000 INJECTION INTRAVENOUS; SUBCUTANEOUS at 07:20

## 2022-04-13 NOTE — ASU DISCHARGE PLAN (ADULT/PEDIATRIC) - NS MD DC FALL RISK RISK
For information on Fall & Injury Prevention, visit: [email or web address] OR  [email or web address] OR  https://www.cdc.gov/steadi/patient.html

## 2022-04-13 NOTE — DISCHARGE NOTE NURSING/CASE MANAGEMENT/SOCIAL WORK - PATIENT PORTAL LINK FT
You can access the FollowMyHealth Patient Portal offered by [facility] by registering at the following website: [email or web address]. By joining [facility]’s FollowMyHealth portal, you will also be able to view your health information using other applications (apps) compatible with our system. Birth Control Pills Pregnancy And Lactation Text: This medication should be avoided if pregnant and for the first 30 days post-partum.

## 2022-04-13 NOTE — BRIEF OPERATIVE NOTE - NSICDXBRIEFPOSTOP_GEN_ALL_CORE_FT
POST-OP DIAGNOSIS:  S/P right mastectomy 13-Apr-2022 12:39:33  Anastacia Holbrook   POST-OP DIAGNOSIS:  Breast cancer, right 13-Apr-2022 10:19:41  Irina Rodriguez  Macromastia 13-Apr-2022 13:06:17  Shari Brambila  S/MARGE right mastectomy 13-Apr-2022 12:39:33  Anastacia Holbrook

## 2022-04-13 NOTE — BRIEF OPERATIVE NOTE - SPECIMENS
right breast; right sentinel node #1 (hot and blue, 1414 counts) right breast; right sentinel node #1 (hot and blue, 1414 counts);  right axillary lymph node

## 2022-04-13 NOTE — BRIEF OPERATIVE NOTE - NSICDXBRIEFPROCEDURE_GEN_ALL_CORE_FT
PROCEDURES:  Breast reduction with reconstruction 13-Apr-2022 12:38:52  Anastacia Holbrook   PROCEDURES:  Breast reduction with reconstruction 13-Apr-2022 12:38:52  Anastacia Holbrook  Right mastectomy with insertion of implant 13-Apr-2022 13:02:51 DTI silicone implant and [facility] procedures Shari Brambila  Application, acellular dermal matrix 13-Apr-2022 13:08:00  Shari Brambila

## 2022-04-13 NOTE — BRIEF OPERATIVE NOTE - NSICDXBRIEFPROCEDURE_GEN_ALL_CORE_FT
PROCEDURES:  Right mastectomy with sentinel lymph node biopsy 13-Apr-2022 10:19:21  Irina Rodriugez

## 2022-04-13 NOTE — ASU DISCHARGE PLAN (ADULT/PEDIATRIC) - CARE PROVIDER_API CALL
Shari Brambila)  Surgery  Plastics  [address], [address]  [place] [address]  Phone: [phone]  Fax: [phone]  Follow Up Time: 1 week    Irina Rodriguez)  Surgery  [facility], [address] [place] [address]  Phone: [phone]  Fax: [phone]  Follow Up Time: 1 week

## 2022-04-13 NOTE — BRIEF OPERATIVE NOTE - NSICDXBRIEFPREOP_GEN_ALL_CORE_FT
PRE-OP DIAGNOSIS:  S/P right mastectomy 13-Apr-2022 12:39:22  Anastacia Holbrook   PRE-OP DIAGNOSIS:  Breast cancer, right 13-Apr-2022 10:19:35  Irina Rodriguez  S/P right mastectomy 13-Apr-2022 12:39:22  Anastacia Holbrook  Macromastia 13-Apr-2022 13:05:40  Shari Brambila

## 2022-04-13 NOTE — ASU DISCHARGE PLAN (ADULT/PEDIATRIC) - PROCEDURE
right breast skin-sparing mastectomy with sentinel lymph node biopsy and right breast reconstruction with direct to silicone implant and alloderm placement, left breast mastopexy and reduction for symmetry

## 2022-04-13 NOTE — BRIEF OPERATIVE NOTE - COMMENTS
her right sentinel lymph node had 3 mm of invasive disease without any extracapsular extension.  These results were discussed with the daughter, Jacinta.  The two options were to proceed with the axillary lymph node dissection + radiation post op vs. omitting the axillary lymph node dissection + radiation post op.  Per the AMAROS trial, there was found to be no difference in overall survival with the addition of the axillary lymph node dissection, but the complication rates were much higher.  For this reason, the decision was NOT to proceed with the axillary lymph node dissection given the low volume of disease found in the lymph node

## 2022-04-13 NOTE — ASU DISCHARGE PLAN (ADULT/PEDIATRIC) - PROVIDER TOKENS
PROVIDER:[TOKEN:[[id number]],FOLLOWUP:[1 week]],PROVIDER:[TOKEN:[[id number]],FOLLOWUP:[1 week]]

## 2022-04-13 NOTE — DISCHARGE NOTE NURSING/CASE MANAGEMENT/SOCIAL WORK - NSDCPEFALRISK_GEN_ALL_CORE
For information on Fall & Injury Prevention, visit: [email or web address] OR  [email or web address] OR  https://www.cdc.gov/steadi/patient.html

## 2022-04-13 NOTE — BRIEF OPERATIVE NOTE - OPERATION/FINDINGS
Right breast reconstruction with direct to silicone implant and alloderm placement. Left breast mastopexy and reduction for symmetry. Right breast reconstruction with direct to silicone implant (605 ml) and alloderm placement. Left breast mastopexy and reduction for symmetry.

## 2022-04-15 ENCOUNTER — NON-APPOINTMENT (OUTPATIENT)
Age: 62
End: 2022-04-15

## 2022-04-18 ENCOUNTER — NON-APPOINTMENT (OUTPATIENT)
Age: 62
End: 2022-04-18

## 2022-04-20 ENCOUNTER — APPOINTMENT (OUTPATIENT)
Dept: PLASTIC SURGERY | Facility: CLINIC | Age: 62
End: 2022-04-20
Payer: MEDICARE

## 2022-04-20 PROCEDURE — 99024 POSTOP FOLLOW-UP VISIT: CPT

## 2022-04-20 NOTE — ASSESSMENT
[FreeTextEntry1] : 63 y/o F with h/o right breast intraductal papilloma s/p R WLE on 1/22/21, now with recent MR detected Stage IIB RIGHT breast invasive mammary carcinoma ([id number], ER+/CO+/Her2+, 7.8 cm superior pole), recommended to undergo skin-sparing mastectomy, which patient has confirmed is her desired oncologic plan.\par \par Now POD#7 s/p right breast immediate reconstruction ([facility]) with direct-to-silicone implant (605cc) and Alloderm via SSM approach and concurrent left breast conservative reduction/mastopexy for symmetry. Doing well. \par \par - posterior drain removed, \par - continue anterior drain monitoring\par - all dressings changed\par - no shower \par - continue oral antibiotics to completion\par - continue surgical bra \par - post-op instructions discussed and all questions answered\par - f/u 1 week for drain and suture removal \par Seen with Dr. Brambila.  \par \par Due to COVID-19, pre-visit patient instructions were explained to the patient and their symptoms were checked upon arrival. Masks were used by the healthcare provider and staff and the examination room was cleaned after the patient visit concluded

## 2022-04-20 NOTE — PHYSICAL EXAM
[de-identified] : well-appearing, NAD [de-identified] : nonlabored breathing [de-identified] : Left breast: soft with scattered bruising and swelling, all incisions healing well, c/d/i, no erythema or fluid collection\par Right breast: implant soft, mastectomy flaps well perfused with good cap refill, periincisional bruising and swelling, no fluid collection, drains functional with ss output  [de-identified] : FROM

## 2022-04-20 NOTE — HISTORY OF PRESENT ILLNESS
[FreeTextEntry1] : 60 yo  F /[facility] survivor with PMHx with NIDDM (on metformin), HLD, UC (on mesalamine), hypothyroidism and right breast intraductal papilloma s/p R WLE on 21, now with recent MR detected Stage IIB RIGHT breast invasive mammary carcinoma ([id number], ER+/AR+/Her2+, 7.8 cm superior pole).  Per Breast Surgeon (Dr. Irina Rodriguez), she is not a great candidate for BCS for 7.8 cm cancer and she was offered Right mastectomy. Pt desires mastectomy. She is now referred by her Breast Surgeon - Dr. Irina Rodriguez for discussion of reconstruction options.\par \par No prior breastfeeding history\par Current bra size: 42C\par Desired reconstructive volume: would like to be smaller and lifted\par C/o heavy breasts a/w shoulder pain and brassiere grooving\par \par Non-smoker\par No personal or family h/o DVT and MRSA \par Ht 5'0" Wt 168 lb BMI 32.8%\par Last Hb a1c 7.4% 2021\par Here today with her daughter, who she lives with\par Daughter is an RN\par \par Interval hx (22): Pt presents for follow up for review of FDA checklist and reconstructive volume goals. Would like to be smaller and lifted\par \par Interval hx (22). Patient presents today accompanied by her daughter POD#7 s/p right breast immediate reconstruction ([facility]) with direct-to-silicone implant (605cc) and Alloderm via SSM approach and concurrent left breast conservative reduction/mastopexy for symmetry. Doing well with improving incisional discomfort. Taking all prescribed medications. Denies any f/c or drainage. Drains functional.

## 2022-04-21 ENCOUNTER — NON-APPOINTMENT (OUTPATIENT)
Age: 62
End: 2022-04-21

## 2022-04-21 ENCOUNTER — APPOINTMENT (OUTPATIENT)
Dept: BREAST CENTER | Facility: CLINIC | Age: 62
End: 2022-04-21
Payer: MEDICARE

## 2022-04-21 DIAGNOSIS — Z79.84 LONG TERM (CURRENT) USE OF ORAL HYPOGLYCEMIC DRUGS: ICD-10-CM

## 2022-04-21 DIAGNOSIS — C77.3 SECONDARY AND UNSPECIFIED MALIGNANT NEOPLASM OF AXILLA AND UPPER LIMB LYMPH NODES: ICD-10-CM

## 2022-04-21 DIAGNOSIS — D24.1 BENIGN NEOPLASM OF RIGHT BREAST: ICD-10-CM

## 2022-04-21 DIAGNOSIS — D05.11 INTRADUCTAL CARCINOMA IN SITU OF RIGHT BREAST: ICD-10-CM

## 2022-04-21 DIAGNOSIS — L90.5 SCAR CONDITIONS AND FIBROSIS OF SKIN: ICD-10-CM

## 2022-04-21 DIAGNOSIS — Z90.49 ACQUIRED ABSENCE OF OTHER SPECIFIED PARTS OF DIGESTIVE TRACT: ICD-10-CM

## 2022-04-21 DIAGNOSIS — K21.9 GASTRO-ESOPHAGEAL REFLUX DISEASE WITHOUT ESOPHAGITIS: ICD-10-CM

## 2022-04-21 DIAGNOSIS — Z79.82 LONG TERM (CURRENT) USE OF ASPIRIN: ICD-10-CM

## 2022-04-21 DIAGNOSIS — E11.9 TYPE 2 DIABETES MELLITUS WITHOUT COMPLICATIONS: ICD-10-CM

## 2022-04-21 DIAGNOSIS — Z17.0 ESTROGEN RECEPTOR POSITIVE STATUS [ER+]: ICD-10-CM

## 2022-04-21 DIAGNOSIS — N60.91 UNSPECIFIED BENIGN MAMMARY DYSPLASIA OF RIGHT BREAST: ICD-10-CM

## 2022-04-21 DIAGNOSIS — E78.00 PURE HYPERCHOLESTEROLEMIA, UNSPECIFIED: ICD-10-CM

## 2022-04-21 DIAGNOSIS — E03.9 HYPOTHYROIDISM, UNSPECIFIED: ICD-10-CM

## 2022-04-21 PROCEDURE — 99024 POSTOP FOLLOW-UP VISIT: CPT

## 2022-04-22 NOTE — DATA REVIEWED
[FreeTextEntry1] : [facility] Accession Number : [id number]\par Patient:   KEVON TERRELL\par \par \par Accession:                             [id number]\par \par Collected Date/Time:                   4/13/2022 09:04 EDT\par Received Date/Time:                    4/13/2022 09:22 EDT\par \par Surgical Pathology Report - Auth (Verified)\par \par Specimen(s) Submitted\par 1  Left breast medial\par Time obtained: 9:04 am\par 2  Left breast lateral\par Time obtained: 9:1 am\par 3  Right breast\par Time obtained: 9:31m\par 4  Deep left breast lateral\par Time obtained: 9:33m\par 5  Right sentinel node #1\par Time obtained: 9:51 am9\par 6  Skin right breast\par Time obtained: 10:20 am\par 7  Right lymph node\par Time obtained: 11:08 am\par 8  Additional right tissue\par 9  Additional left tissue\par \par \par Final Diagnosis\par 1.  Breast, left medial tissue and skin, excision:\par - Benign skin and subcutaneous adipose tissue without histopathologic\par abnormality.\par \par 2.  Breast, left lateral tissue and skin, excision:\par - Benign skin and subcutaneous adipose tissue without histopathologic\par abnormality.\par \par 3.  Breast, right, skin sparing simple mastectomy:\par -Healing prior biopsy site changes in the upper outer and upper inner\par quadrants with invasive well differentiated ductal carcinoma, 5.4\par cm (radiologic measurement), and non-extensive ductal carcinoma\par in situ (DCIS), solid and micropapillary types associated with\par microcalcifications, low to intermediate nuclear grade, extending to\par involve an adjacent intraductal papilloma ("atypical papilloma").\par - Numerous foci of lymphovascular invasion are present.\par - Atypical lobular hyperplasia (ALH), radial sclerosing lesion (radial\par scar), hyalinized fibroadenoma, proliferative type fibrocystic changes\par associated with microcalcifications.\par - Benign nipple with focal squamous metaplasia of lactational ducts\par (SMOLD), skin, and deep fascial margin.  Negative for carcinoma.\par - For hormone receptor and oncoprotein expression/gene amplification\par status please see the pathology report from the prior needle core biopsy\par specimen ([id number]).\par - AJCC Eighth Edition Pathologic Stage: pT3, pN1a, pMx.\par \par 4.  Breast, left lateral deep tissue, excision:\par - Benign atrophic breast tissue with proliferative type fibrocystic\par changes associated with microcalcifications.\par \par 5.  Breast, right axillary sentinel lymph node #1, biopsy:\par - Metastatic carcinoma present in one sentinel lymph node (1/1),\par the largest contiguous focus of which measures 5.5 mm (microscopic\par measurement).\par - Metastatic tumor cells are present within lymphovascular spaces of the\par extracapsular soft tissue.\par \par 6.  Breast, right skin, excision:\par - Benign skin without histopathologic abnormality.\par \par 7.  Breast, right non-sentinel axillary lymph node, biopsy:\par - Single cytokeratin positive focus of micro-metastatic carcinoma present\par in one of two axillary lymph nodes (1/2), measuring 0.26 mm (microscopic\par measurement with the use of an ocular micrometer).\par - Both lymph nodes are markedly distorted by electrocautery thermal\par \par artifact.\par - No extracapsular extension is seen.\par \par 8.  Breast, right additional tissue and skin, excision:\par - Benign skin, subcutaneous adipose tissue, and atrophic fatty breast\par tissue; all without histopathologic abnormality.\par \par 9.  Breast, left additional tissue and skin, excision:\par - Benign skin, subcutaneous adipose tissue, and atrophic fatty breast\par tissue; all without histopathologic abnormality.\par Verified by: Merlin De Los Santos M.D.\par (Electronic Signature)\par Reported on: 04/20/22 15:09 EDT, [facility],\par [address] [facility], [place] [address]\par Phone: [phone]   Fax: [phone]\par _________________________________________________________________\par \par \par Comment\par Case reported to Tumor Registry.\par \par Intraoperative Consultation\par The specimen is submitted for INTRAOPERATIVE CONSULTATION and the FROZEN\par SECTION diagnosis is reported at [address] [place] [address] as:\par \par 5. Metastatic carcinoma in one sentinel lymph node (1/1), 3.0 mm\par \par Received at: 10:00 am\par Verbally reported at: 10:44 am by: YULIA De Los Santos MD to: TARA Rodriguez MD\par \par Clinical Information\par Right breast mastectomy, sentinel node mapping and biopsy, possible\par axillary node dissection, right breast reconstruction, left breast\par mastopexy/reduction\par \par \par Perioperative Diagnosis\par Breast CA\par \par Gross Description\par 1.   Specimen received fresh, labeled "left breast medial" and consists\par of irregular, unoriented, white, wrinkled skin, grossly unremarkable,\par weighing 19 g and measuring 8 x 3 x 1.5 cm.  The deep margin is inked\par black.  The specimen is serially sectioned, the cross section is grossly\par unremarkable. Representative sections submitted.   (3 blocks)\par \par 2. Specimen received fresh, labeled "left breast lateral" and consists\par of irregular, unoriented, white, wrinkled skin, grossly unremarkable,\par weighing 47 g and measuring 11 x 4.5 x 2.5 cm.  The deep margin is inked\par black.  The specimen is serially sectioned, the cross section is grossly\par unremarkable. Representative sections submitted.   (3 blocks)\par \par 3. specimen received fresh, labeled "right breast, triple suture superior,\par double suture lateral".  Specimen consists of a mastectomy specimen with\par attached nipple areola complex, weighing 764 g, and measuring 18 x 15\par x 5 cm.  The specimen is oriented as follows: Triple suture superior,\par double suture lateral. The nipple is inverted, but is able to be everted\par using the forceps, measures 1 x 0.8 x 0.8 cm.  The areola measures 4.5\par x 4 cm, grossly unremarkable. The specimen is inked as follows: Upper\par outer quadrant purple, upper inner quadrant green, lower inner quadrant\par orange, lower outer quadrant yellow, and deep margins black.   Specimen\par is serially sectioned from medial to lateral, and radiographed.  A single\par biopsy clip was identified in the radiograph in the left outer quadrant\par (section 5 of the specimen).  The area corresponding to the biopsy\par \par clip shows a tan-white, irregular lesion, measuring 2 x 2 x 1.5 cm,\par grossly abutting the deep margin.  A second lesion, tan-white, irregular,\par measuring 2 x 1.5 x 1.5 cm is identified 6 cm medial to the lesion with\par the biopsy clip.  The second lesion is 1.5 cm from the  closest deep\par margin.  Representative sections are submitted.\par \par Summary of sections:\par 3A-nipple section-1\par 3B- areola immediately adjacent to the nipple-1\par 3C-nipple perpendicularly sectioned-1\par 3D-3L-lesion corresponding to the clip (block 3F-3G contains the clip\par area)-9\par 3M-3U-second lesion-9\par 3V-3W-upper outer quadrant-2\par 3X-3Y-upper inner quadrant-2\par 3C-3 AA-lower inner quadrant-2\par 3AB-3AC-lower outer quadrant-2\par 3AD-3AE-deep margin closest to the second lesion-2\par 3AF-breast tissue between the 2 lesions-1\par \par Total: 32 blocks\par \par 4. Specimen received fresh, labeled "deep left breast lateral".  Specimen\par consists of irregular, unoriented fibrofatty tissue, weighing 127 g,\par measuring 11 x 7 x 4 cm.  The external surface is inked black.  Specimen\par is serially sectioned, to reveal a grossly unremarkable cut surface.\par Representative sections submitted.  (6 blocks)\par \par 5. The specimen is received in fresh for frozen, labeled "right sentinel\par node #1" and consist of a lymph node, measuring 2.2 x 2 x 1.2 cm.     The\par specimen is submitted entirely.\par \par Summary of sections:\par 5FSA-5FSB - one lymph node- 2\par 5A- remainder fatty tissue- 1\par \par Total: 3 blocks\par \par 6. Specimen received fresh, labeled "skin right breast".  Specimen\par consists of a single, white, irregular, and oriented piece of skin,\par measuring 10 x 5 x 0.2 cm.  Grossly no subcutaneous tissue is attached.\par Representative sections submitted. (1 block)\par \par 7. The specimen is received in formalin, labeled "right lymph node" and\par consist of two lymph nodes, measuring 1 x 0.4 x 0.3 cm and 0.7 x 0.3 x\par 0.3 cm. The specimen is submitted entirely. (1 blocks)\par \par 8. Specimen received fresh, labeled "additional right tissue".  Specimen\par consists of irregular, unoriented, white skin with attached subcutaneous\par tissue, weighing 4 g, and measuring 9.5 x 2 x 0.5 cm.  The deep margin is\par inked black.  Specimen is serially sectioned.  The cross section appears\par grossly unremarkable.  Representative sections submitted. (2 blocks)\par \par 9. Specimen received fresh, labeled "additional left tissue".  Specimen\par consists of irregular, unoriented, white skin with attached subcutaneous\par tissue, weighing 5 g, and measuring 2 x 2 x 2 cm.  The deep margin is\par \par inked black.  Specimen is serially sectioned. The cross section appears\par grossly unremarkable.  Representative sections submitted.  (2 blocks)\par \par Specimen was received and underwent gross examination at [facility], [address] [place] [address].\[facility] \par 04/13/2022 13:37:24 EDT bc\[facility]

## 2022-04-22 NOTE — REVIEW OF SYSTEMS
[As Noted in HPI] : as noted in HPI [Negative] : Heme/Lymph [Fever] : no fever [Chills] : no chills [Abn Vaginal Bleeding] : no unexplained vaginal bleeding [Skin Lesions] : no skin lesions [Skin Wound] : skin wound [Breast Pain] : no breast pain [Breast Lump] : no breast lump [Hot Flashes] : no hot flashes

## 2022-04-22 NOTE — PHYSICAL EXAM
[Normocephalic] : normocephalic [Atraumatic] : atraumatic [EOMI] : extra ocular movement intact [No Supraclavicular Adenopathy] : no supraclavicular adenopathy [No Cervical Adenopathy] : no cervical adenopathy [de-identified] : resolving ecchymoses in bilateral breasts, steristrips in place, no evidence of erythema, induration, or infection  [de-identified] : ARNAV drain in place

## 2022-04-22 NOTE — REASON FOR VISIT
[FreeTextEntry1] : hx of right intraductal papilloma, s/p R WLE on 1/22/2021, now with R breast invasive mammary carcinoma, [id number], ER/[place] pos, Her 2 neg, anatomic stage IIIA, prognostic stage IB, s/p R mastectomy, SLN Bx, with immediate reconstruction ([facility]) with direct to silicone implant and concurrent L breast reduction/mastopexy for symmetry on 4/13/2022

## 2022-04-22 NOTE — HISTORY OF PRESENT ILLNESS
[FreeTextEntry1] : Imtiaz is a 61 F with a Right breast intraductal papilloma, s/p R WLE on 2021, now with a MR detected RIGHT breast invasive mammary carcinoma, [id number], ER/GA pos, her 2 neg, anatomic stage IIIA, prognostic stage IB, s/p R mastectomy, SLN Bx, with immediate reconstruction ([facility]) with direct to silicone implant and concurrent L breast reduction/mastopexy for symmetry on 2022, AJCC 8th edition. \par \par 2022 -- R mastectomy, SLN Bx, with immediate reconstruction ([facility]) with direct to silicone implant and concurrent L breast reduction/mastopexy for symmetry\par 1. 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 R SLN Bx \par - SLN positive for mets, measuring 5.5 mm, metastatic tumor cell present within the lymphovascular spaces of the extracapsular soft tissue \par -1/2 non sentinel nodes positive for micrometastatic carcinoma, no CAROLINE \par 3. R additional tissue and skin \par -benign \par 4. 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 \par \par 2021 -- R WLE \par -sclerosing papilloma \par -atypical ductal hyperplasia \par \par Her work up was as follows: \par 2020 -- b/l screening mammogram \par -heterogenously dense breasts\par -calcifications in retroareolar R breast \par -no suspicious mass, microcalcifications or architectural distortion in L breast \par BIRADS 0 \par \par 2020 -- R dx mammogram \par -heterogenously dense breasts\par -oval circumscribed mass with calcifications in retroareolar R breast, measures 0.4 cm \par R US \par -@9N3, round circumscribed mass, measuring 3 x 2 x 2 mm\par BIRADS 4 \par \par 3/6/2020 -- repeat R dx mammogram and US \par -@9N3, fluctuating cyst, slightly decreased in size measuring 2 x 2 x 2 mm \par BIRADS 3 \par \par 2020 -- R dx mammogram and US \par -heterogenously dense breasts\par -change in calcifications in retroareolar R breast --> BIOPSY \par R US \par -@9N3, previously visualized mass is no longer seen \par BIRADS 4\par \par 10/1/2020 -- R stereotactic guided biopsy (tophat)\par -intraductal papilloma \par \par She has no breast related complaints at this time.  She denies any breast pain, has not palpated any new palpable masses in either breast and denies any nipple discharge or retraction.  \par \par HISTORICAL RISK FACTORS: \par -no prior breast biopsies or surgeries \par -no family history of breast or ovarian cancer \par -, age at first live birth was 28 \par -prior OCP use \par -no gyn surgeries\par \par INTERVAL HISTORY: \par Imtiaz returns for her follow up visit, s/p R WLE on 2021. \par \par Her final pathology revealed an atypical papilloma with atypical ductal hyperplasia.  \par Her risk assessment is as follows: \par RISK ASSESSMENT: \par Tanja\par 5yr -- 3.3%\par lifetime -- 16.2%\par \par TC v6 \par 5yr -- 5.3%\par lifetime -- 20.6%\par \par INTERVAL HISTORY:\par 2021 \par Imtiaz returns for a 6 month follow up for benign intermediate to high risk disease 2/2 R intraductal papilloma and atypical ductal hyperplasia. \par \par She has no breast related complaints at this time.  She denies any breast pain, has not palpated any new palpable masses in either breast and denies any nipple discharge or retraction.  \par \par Her most recent imaging on 21 which revealed, postsurgical architectural distortion in the right breast, and on R breast US, @12:00 N5, she has an oval circumscribed mass measuring 0.4 x 0.4 x 0.3 cm favored to be simple cyst, deemed BI-RADS Category 2: Benign. \par \par INTERVAL HISTORY: \par 3/2/2022\par Imtiaz returns for a short term follow up, now with a new diagnosis of a MR detected RIGHT breast invasive mammary carcinoma, [id number], ER/GA pos, her 2 PENDING, anatomic stage IIB, AJCC 8th edition. \par \par She called the office with two episodes of RIGHT bloody nipple discharge.  The first time was at the end of August, no testing was performed at this time as she had normal imaging one month prior.  She had a second episode of right bloody nipple discharge, but a much larger amount, and at this time she was sent for the following diagnostic imaging.  \par \par She has not had any further episodes of bloody nipple discharge and has not palpated any abnormal masses within either breast and otherwise healed well from her surgery. \par \par 2021 -- R dx mammogram and US \par -heterogenously dense breasts\par -architectural distortion in the RA region from recent surgery.  \par -scattered coarse benign appearing calcifications \par -no suspicious mass, calcs, or areas of architectural distortion \par R US \par -no solid or cystic lesion seen \par BIRADS 2 -- rec BREAST MRI \par \par 2021 -- MR breast \par RIGHT: \par -extensive nonmass enhancement in R superior breast with areas of clumped irregular enhancement, spans 5.4 x 5.3 x 7.8 cm --> BIOPSY medial and lateral aspects \par -no lymphadenopathy \par LEFT: \par -scattered foci of nonspecific enhancement\par -enlarged L axillary LN consistent with recent vaccine history \par BIRADS 4\par \par 2022 -- R MR guided biopsy x 2 \par 1. R MR Bx, lateral aspect of enhancement, hourglass clip \par -invasive mammary carcinoma with both tubular and lobular features \par -DCIS, intermediate nuclear grade \par -focal ALH \par -ER/GA pos (Michael ), Her 2 PENDING\par -Ki 67: 3-5%\par \par 2. R MR Bx, medial aspect of enhancement, minicork clip was placed but was extruded after the biopsy \par -invasive mammary carcinoma with both tubular and lobular features \par -DCIS, intermediate nuclear grade \par -focal ALH \par -ER/GA pos (Michael ), Her 2 PENDING\par -Ki 67: 7-10%\par \par 2022 -- L screening mammogram \par -heterogenously dense breasts\par -stable calcifications in L lateral breast, unchanged from 2018, c/w benign etiology \par -stable nodular asymmetries in L, unchanged from 2018 \par -no suspicious mass, calcs, architectural distortion in L \par BIRADS 2 \par \par INTERVAL HISTORY: \par 2022 \par Imtiaz is a 62 F with R breast cancer, s/p R mastectomy, SLN Bx, with immediate reconstruction ([facility]) with direct to silicone implant and concurrent L breast reduction/mastopexy for symmetry on 2022, here for her FIRST POST OP VISIT. \par \par She is feeling well.  Her pain is well controlled and she is healing well from her recent surgery.  She still has 1 ARNAV drain in place which is being managed by Dr. Brambila (plastic surgery). \par \par She denies any fevers or chills and her pain is well controlled. \par \par Her final pathology revealed a 5.4 cm well differentiated IDC, nonextensive DCIS that was low to intermediate grade, although there was numerous foci of LVI, negative surgical margins and 1 SLN with 5.5 mm of metastatic disease, 1/2 non-sentinel axillary lymph nodes with micrometastatic disease, and benign breast tissue in the left breast. \par \par

## 2022-04-25 ENCOUNTER — NON-APPOINTMENT (OUTPATIENT)
Age: 62
End: 2022-04-25

## 2022-04-29 ENCOUNTER — APPOINTMENT (OUTPATIENT)
Dept: PLASTIC SURGERY | Facility: CLINIC | Age: 62
End: 2022-04-29
Payer: MEDICARE

## 2022-04-29 PROCEDURE — 99024 POSTOP FOLLOW-UP VISIT: CPT

## 2022-04-29 NOTE — PHYSICAL EXAM
[de-identified] : well-appearing, NAD [de-identified] : nonlabored breathing [de-identified] : Left breast: soft, all incisions healing well, c/d/i, no erythema or fluid collection\par Right breast: implant soft, mastectomy flaps well perfused with good cap refill, vertical incision steristrip drained blister, T-point T-point evolving tissue ischemia; no fluid collection, drains functional with ss output  [de-identified] : FROM

## 2022-04-29 NOTE — HISTORY OF PRESENT ILLNESS
[FreeTextEntry1] : 62 yo  F /[facility] survivor with PMHx with NIDDM (on metformin), HLD, UC (on mesalamine), hypothyroidism and right breast intraductal papilloma s/p R WLE on 21, now with recent MR detected Stage IIB RIGHT breast invasive mammary carcinoma ([id number], ER+/ME+/Her2+, 7.8 cm superior pole).  Per Breast Surgeon (Dr. Irina Rodriguez), she is not a great candidate for BCS for 7.8 cm cancer and she was offered Right mastectomy. Pt desires mastectomy. She is now referred by her Breast Surgeon - Dr. Irina Rodriguez for discussion of reconstruction options.\par \par No prior breastfeeding history\par Current bra size: 42C\par Desired reconstructive volume: would like to be smaller and lifted\par C/o heavy breasts a/w shoulder pain and brassiere grooving\par \par Non-smoker\par No personal or family h/o DVT and MRSA \par Ht 5'0" Wt 168 lb BMI 32.8%\par Last Hb a1c 7.4% 2021\par Here today with her daughter, who she lives with\par Daughter is an RN\par \par Interval hx (22): Pt presents for follow up for review of FDA checklist and reconstructive volume goals. Would like to be smaller and lifted\par \par Interval hx (22). Patient presents today accompanied by her daughter POD#7 s/p right breast immediate reconstruction ([facility]) with direct-to-silicone implant (605cc) and Alloderm via SSM approach and concurrent left breast conservative reduction/mastopexy for symmetry. Doing well with improving incisional discomfort. Taking all prescribed medications. Denies any f/c or drainage. Drains functional. \par \par Interval hx (22): POD#16 s/p right breast immediate reconstruction ([facility]) with direct-to-silicone implant (605cc) and Alloderm via SSM approach and concurrent left breast conservative reduction/mastopexy for symmetry.   Reports taking tramadol x 2 yesterday for right upper breast. Denies fevers, chills, SOB, CP.  Completed abx on Wed. Right breast drain; 15/13/13/10.

## 2022-04-29 NOTE — ASSESSMENT
[FreeTextEntry1] : 63 y/o F with h/o right breast intraductal papilloma s/p R WLE on 1/22/21, now with recent MR detected Stage IIB RIGHT breast invasive mammary carcinoma ([id number], ER+/HI+/Her2+, 7.8 cm superior pole), recommended to undergo skin-sparing mastectomy, which patient has confirmed is her desired oncologic plan.\par \par Now POD#16 s/p right breast immediate reconstruction ([facility]) with direct-to-silicone implant (605cc) and Alloderm via SSM approach and concurrent left breast conservative reduction/mastopexy for symmetry. Doing well.   No signs of infection\par \par - sutures were removed\par - IMF drain removed, met criteria\par - start BID bacitracin/DSD right breast incision\par - Daily aquaphor and Scarway right breast incision\par - May shower (back)\par - continue surgical bra \par - wall crawl exercises reviewed\par - physical activity restrictions reviewed\par - post-op instructions discussed and all questions answered\par - all questions were answered\par - f/u 1 week for T-point check\par \par \par Due to COVID-19, pre-visit patient instructions were explained to the patient and their symptoms were checked upon arrival. Masks were used by the healthcare provider and staff and the examination room was cleaned after the patient visit concluded

## 2022-05-05 ENCOUNTER — NON-APPOINTMENT (OUTPATIENT)
Age: 62
End: 2022-05-05

## 2022-05-06 ENCOUNTER — APPOINTMENT (OUTPATIENT)
Dept: PLASTIC SURGERY | Facility: CLINIC | Age: 62
End: 2022-05-06
Payer: MEDICARE

## 2022-05-06 DIAGNOSIS — D24.1 BENIGN NEOPLASM OF RIGHT BREAST: ICD-10-CM

## 2022-05-06 PROCEDURE — 99024 POSTOP FOLLOW-UP VISIT: CPT

## 2022-05-06 NOTE — PHYSICAL EXAM
[de-identified] : well-appearing, NAD [de-identified] : Left breast: soft, all incisions healing well, c/d/i, no erythema or fluid collection, minor superficial T-point wound with fibrinous base, now debrided, clean\par Right breast: implant soft, mastectomy flaps well perfused with good cap refill, vertical incision with epidermolysis, T-point evolving tissue ischemia with non viable base, now debrided, no exposed implant;  minor erythema,  no fluid collection [de-identified] : nonlabored breathing [de-identified] : FROM

## 2022-05-06 NOTE — HISTORY OF PRESENT ILLNESS
[FreeTextEntry1] : 60 yo  F /[facility] survivor with PMHx with NIDDM (on metformin), HLD, UC (on mesalamine), hypothyroidism and right breast intraductal papilloma s/p R WLE on 21, now with recent MR detected Stage IIB RIGHT breast invasive mammary carcinoma ([id number], ER+/NM+/Her2+, 7.8 cm superior pole).  Per Breast Surgeon (Dr. Irina Rodriguez), she is not a great candidate for BCS for 7.8 cm cancer and she was offered Right mastectomy. Pt desires mastectomy. She is now referred by her Breast Surgeon - Dr. Irina Rodriguez for discussion of reconstruction options.\par \par No prior breastfeeding history\par Current bra size: 42C\par Desired reconstructive volume: would like to be smaller and lifted\par C/o heavy breasts a/w shoulder pain and brassiere grooving\par \par Non-smoker\par No personal or family h/o DVT and MRSA \par Ht 5'0" Wt 168 lb BMI 32.8%\par Last Hb a1c 7.4% 2021\par Here today with her daughter, who she lives with\par Daughter is an RN\par \par Interval hx (22): Pt presents for follow up for review of FDA checklist and reconstructive volume goals. Would like to be smaller and lifted\par \par Interval hx (22). Patient presents today accompanied by her daughter POD#7 s/p right breast immediate reconstruction ([facility]) with direct-to-silicone implant (605cc) and Alloderm via SSM approach and concurrent left breast conservative reduction/mastopexy for symmetry. Doing well with improving incisional discomfort. Taking all prescribed medications. Denies any f/c or drainage. Drains functional. \par \par Interval hx (22): POD#16 s/p right breast immediate reconstruction ([facility]) with direct-to-silicone implant (605cc) and Alloderm via SSM approach and concurrent left breast conservative reduction/mastopexy for symmetry.   Reports taking tramadol x 2 yesterday for right upper breast. Denies fevers, chills, SOB, CP.  Completed abx on Wed. Right breast drain; 15/13/13/10.\par \par Interval hx (22): Pt presents today POD#23 s/p right breast immediate reconstruction ([place]) with direct-to-silicone implant (605cc) and Alloderm via SSM approach and concurrent left breast conservative reduction/mastopexy for symmetry. Doing well. Denies any f/c or drainage.

## 2022-05-06 NOTE — ASSESSMENT
[FreeTextEntry1] : 63 y/o F with h/o right breast intraductal papilloma s/p R WLE on 1/22/21, now with recent MR detected Stage IIB RIGHT breast invasive mammary carcinoma ([id number], ER+/AR+/Her2+, 7.8 cm superior pole), recommended to undergo skin-sparing mastectomy, which patient has confirmed is her desired oncologic plan.\par \par Now POD#23 s/p right breast immediate reconstruction ([place]) with direct-to-silicone implant (605cc) and Alloderm via SSM approach and concurrent left breast conservative reduction/mastopexy for symmetry. \par Right breast incisional epidermolysis.  No signs of infection.\par \par - LWC with BID bacitracin/DSD right breast incision and T-point wound\par - Rx Doxycycline for implant prophylaxis \par - Daily Aquaphor and Scarway to left breast incision\par - continue surgical bra \par - physical activity restrictions reviewed\par - post-op instructions discussed and all questions answered\par - all questions were answered\par - f/u 1 week for right breast check \par \par Due to COVID-19, pre-visit patient instructions were explained to the patient and their symptoms were checked upon arrival. Masks were used by the healthcare provider and staff and the examination room was cleaned after the patient visit concluded

## 2022-05-10 ENCOUNTER — APPOINTMENT (OUTPATIENT)
Dept: HEMATOLOGY ONCOLOGY | Facility: CLINIC | Age: 62
End: 2022-05-10
Payer: MEDICARE

## 2022-05-10 VITALS
BODY MASS INDEX: 32.2 KG/M2 | SYSTOLIC BLOOD PRESSURE: 128 MMHG | HEART RATE: 131 BPM | DIASTOLIC BLOOD PRESSURE: 80 MMHG | HEIGHT: 60 IN | WEIGHT: 164 LBS | TEMPERATURE: 97.3 F

## 2022-05-10 PROCEDURE — 99205 OFFICE O/P NEW HI 60 MIN: CPT

## 2022-05-10 NOTE — PHYSICAL EXAM
[Fully active, able to carry on all pre-disease performance without restriction] : Status 0 - Fully active, able to carry on all pre-disease performance without restriction [Normal] : affect appropriate [de-identified] : S/P right breast mastectomy, SLNB and left breast reduction.

## 2022-05-10 NOTE — CONSULT LETTER
[Dear  ___] : Dear  [unfilled], [Consult Letter:] : I had the pleasure of evaluating your patient, [unfilled]. [Please see my note below.] : Please see my note below. [Consult Closing:] : Thank you very much for allowing me to participate in the care of this patient.  If you have any questions, please do not hesitate to contact me. [Sincerely,] : Sincerely, [FreeTextEntry3] : Alma Martinez MD [DrCarol  ___] : Dr. TYLER

## 2022-05-10 NOTE — HISTORY OF PRESENT ILLNESS
[de-identified] : 63 yo female was referred by Dr. Rodriguez for consultation of breast cancer. She has h/o right breast intraductal papilloma, s/p R WLE on 1/22/2021. MRI breast on 12/17/21 showed large irregular nonmass enhancement in the right superior breast.  No right axillary adenopathy. MRI guided biopsy of 2 areas was recommended. Scattered foci of nonspecific enhancement in the left breast. Enlarged left axillary lymph nodes consistent with recent vaccine history. Low-lying left axillary lymph node also noted.\par \par \par On 2/25/22, She had MRI guided biopsies x 2:\par 1. Breast, right lateral irregular non-mass like enhancement, MRI guided needle core biopsies:\par - Invasive well differentiated mammary carcinoma with both tubular and lobular features (tubulo-lobular carcinoma), ER positive, 100%, HI positive 85%, Ki 67 3-5%, Her-2 negative.\par - Ductal carcinoma in-situ (DCIS), micropapillary and solid types with comedo necrosis and microcalcifications, intermediate nuclear grade.\par - Focal atypical lobular hyperplasia (ALH) and proliferative type fibrocystic changes associated with microcalcifications.\par \par 2. Breast, right medial irregular non-mass like enhancement, MRI guided needle core biopsies:\par - Invasive well differentiated mammary carcinoma with both tubular and lobular features (tubulo-lobular carcinoma) associated, ER pos 100%, HI pos 90%, Her-2 negative, Ki 67 7-10%.\par - Ductal carcinoma in-situ (DCIS), micropapillary and solid types with comedo necrosis and microcalcifications, intermediate nuclear grade; extending to involve a sclerosing intraductal papilloma ("atypical papilloma").\par - Focal atypical lobular hyperplasia (ALH) and proliferative type fibrocystic changes associated with microcalcifications.\par \par On 4/13/22, she underwent right breast skin sparing simple mastectomy, SLNB and left breast reduction. The pathology revealed 5.4 cm invasive well differentiated ductal, non-extensive ductal carcinoma in situ (DCIS), low to intermediate nuclear grade. Numerous foci of lymphovascular invasion are present. One SLN showed metastatic carcinoma with the largest contiguous focus measuring 5.5 mm. Metastatic tumor cells are present within lymphovascular spaces of the extracapsular soft tissue. One of two right non-sentinel axillary lymph nodes showed micrometastatic carcinoma measuring 0.26 mm. AJCC Eighth Edition Pathologic Stage: pT3, pN1a, pMx.\par Oncotype RS is 20. \par \par She is recovering from surgery and there is small nonhealing area at incision site. She has been followed by Dr. Brambila. She is here with her daughter to discuss systemic adjuvant therapy.

## 2022-05-10 NOTE — ASSESSMENT
[FreeTextEntry1] : 63 yo female has stage IIIA ([id number]) IDC of the right breast, G1, ER/NC positive, Her-2 negative, s/p simple right mastectomy, SLNB, implant reconstruction and left breast reduction.\par Oncotype RS is 20.\par \par Assessment and Plan:\par A detailed discussion was held with the patient and her daughter regarding her diagnosis, staging, prognosis and adjuvant systemic therapy. The pathology and Oncotype Dx assay were reviewed. We discussed adjuvant systemic therapy including adjuvant chemotherapy followed by adjuvant endocrine therapy or adjuvant endocrine therapy alone. Imtiaz has hormone receptor positive and Her-2 negative breast cancer with 5.4 cm tumor, G1 and two positive axillary lymph nodes. Her Oncotype RS is 20, predicting low likelihood benefit from adjuvant chemotherapy. The update from the RxPONDER trial results show that postmenopausal female with pT1-3, HR positive, Her-2 negative breast cancer with RS <26 derived no benefit from the addition of chemotherapy to endocrine therapy. As per NCCN guideline, she may forgo chemotherapy and proceed with adjuvant endocrine therapy. \par \par Imtiaz is comfortable to forgo chemotherapy and she will take adjuvant endocrine therapy. We discussed adjuvant endocrine therapy with an aromatase inhibitor. Letrozole is recommended. The benefits and side effects were reviewed. Letrozole may cause muscular skeletal symptoms, arthralgia, decreased bone density, osteopenia and osteoporosis, small increased risk of cardiovascular events and slightly increase of hyperlipidemia. \par \par We discussed bone health management since Letrozole may reduce bone density. She was given a referral for repeat bone density.  Continue calcium and vitamin D supplement. Encourage health life style and regular physical activities. \par \par In light of T3 tumor and positive axillary lymphs, she will be referred for staging CT c/a/p with contrast and bone scan. She will call me to discuss the results.\par \par All her questions were answered appropriately. She agrees to take the pill. A prescription for Letrozole was sent to her pharmacy. She will start Letrozole in 2 to 3 weeks.\par \par She has an appt to see Dr. Salazar to discuss post mastectomy adjuvant radiotherapy. \par \par RTO for followup in three months.

## 2022-05-12 ENCOUNTER — APPOINTMENT (OUTPATIENT)
Dept: PLASTIC SURGERY | Facility: CLINIC | Age: 62
End: 2022-05-12
Payer: MEDICARE

## 2022-05-12 ENCOUNTER — NON-APPOINTMENT (OUTPATIENT)
Age: 62
End: 2022-05-12

## 2022-05-12 PROCEDURE — 99024 POSTOP FOLLOW-UP VISIT: CPT

## 2022-05-12 NOTE — ASSESSMENT
[FreeTextEntry1] : 61 y/o F with h/o right breast intraductal papilloma s/p R WLE on 1/22/21, now with recent MR detected Stage IIB RIGHT breast invasive mammary carcinoma ([id number], ER+/CT+/Her2+, 7.8 cm superior pole), recommended to undergo skin-sparing mastectomy, which patient has confirmed is her desired oncologic plan.\par \par Now POD#29 s/p right breast immediate reconstruction ([facility]il[facility]) with direct-to-silicone implant (605cc) and Alloderm via SSM approach and concurrent left breast conservative reduction/mastopexy for symmetry. \par Right breast incisional epidermolysis.  No signs of infection. No exposed implant. \par \par - minor debridement performed today\par - LWC with WTD BID right breast incision and T-point wound\par - Rx Augmentin for implant prophylaxis \par - Daily Aquaphor and Scarway to left breast incision\par - continue surgical bra, new one provided \par - physical activity restrictions reviewed\par - post-op instructions discussed and all questions answered\par - all questions were answered\par - f/u 1 week for right breast check \par \par Due to COVID-19, pre-visit patient instructions were explained to the patient and their symptoms were checked upon arrival. Masks were used by the healthcare provider and staff and the examination room was cleaned after the patient visit concluded

## 2022-05-12 NOTE — HISTORY OF PRESENT ILLNESS
[FreeTextEntry1] : 62 yo  F /[facility] survivor with PMHx with NIDDM (on metformin), HLD, UC (on mesalamine), hypothyroidism and right breast intraductal papilloma s/p R WLE on 21, now with recent MR detected Stage IIB RIGHT breast invasive mammary carcinoma ([id number], ER+/NE+/Her2+, 7.8 cm superior pole).  Per Breast Surgeon (Dr. Irina Rodriguez), she is not a great candidate for BCS for 7.8 cm cancer and she was offered Right mastectomy. Pt desires mastectomy. She is now referred by her Breast Surgeon - Dr. Irina Rodriguez for discussion of reconstruction options.\par \par No prior breastfeeding history\par Current bra size: 42C\par Desired reconstructive volume: would like to be smaller and lifted\par C/o heavy breasts a/w shoulder pain and brassiere grooving\par \par Non-smoker\par No personal or family h/o DVT and MRSA \par Ht 5'0" Wt 168 lb BMI 32.8%\par Last Hb a1c 7.4% 2021\par Here today with her daughter, who she lives with\par Daughter is an RN\par \par Interval hx (22): Pt presents for follow up for review of FDA checklist and reconstructive volume goals. Would like to be smaller and lifted\par \par Interval hx (22). Patient presents today accompanied by her daughter POD#7 s/p right breast immediate reconstruction ([facility]) with direct-to-silicone implant (605cc) and Alloderm via SSM approach and concurrent left breast conservative reduction/mastopexy for symmetry. Doing well with improving incisional discomfort. Taking all prescribed medications. Denies any f/c or drainage. Drains functional. \par \par Interval hx (22): POD#16 s/p right breast immediate reconstruction ([facility]) with direct-to-silicone implant (605cc) and Alloderm via SSM approach and concurrent left breast conservative reduction/mastopexy for symmetry.   Reports taking tramadol x 2 yesterday for right upper breast. Denies fevers, chills, SOB, CP.  Completed abx on Wed. Right breast drain; 15/13/13/10.\par \par Interval hx (22): Pt presents today POD#23 s/p right breast immediate reconstruction ([place]) with direct-to-silicone implant (605cc) and Alloderm via SSM approach and concurrent left breast conservative reduction/mastopexy for symmetry. Doing well. Denies any f/c or drainage. \par \par Interval hx (22): Pt presents today POD#29 s/p right breast immediate reconstruction ([facility]) with direct-to-silicone implant (605cc) and Alloderm via SSM approach and concurrent left breast conservative reduction/mastopexy for symmetry. Performing daily LWC. Denies any f/c or drainage. Taking Doxycycline as prescribed.

## 2022-05-12 NOTE — PHYSICAL EXAM
[de-identified] : well-appearing, NAD [de-identified] : nonlabored breathing [de-identified] : Left breast: soft, all incisions healing well, c/d/i, no erythema or fluid collection, minor superficial T-point wound with fibrinous base, now debrided, clean\par Right breast: implant soft, mastectomy flaps well perfused with good cap refill, vertical incision with epidermolysis and nonviable tissue now debrided, T-point evolving tissue ischemia with non viable base, now debrided, no exposed implant;  minor erythema,  no fluid collection [de-identified] : FROM

## 2022-05-17 ENCOUNTER — NON-APPOINTMENT (OUTPATIENT)
Age: 62
End: 2022-05-17

## 2022-05-17 RX ORDER — SOFT LENS ADJUNCTIVE SOLUTIONS
AEROSOL (ML) MISCELLANEOUS
Qty: 1 | Refills: 0 | Status: ACTIVE | COMMUNITY
Start: 2022-05-17 | End: 1900-01-01

## 2022-05-19 ENCOUNTER — NON-APPOINTMENT (OUTPATIENT)
Age: 62
End: 2022-05-19

## 2022-05-20 ENCOUNTER — APPOINTMENT (OUTPATIENT)
Dept: PLASTIC SURGERY | Facility: CLINIC | Age: 62
End: 2022-05-20
Payer: MEDICARE

## 2022-05-20 ENCOUNTER — NON-APPOINTMENT (OUTPATIENT)
Age: 62
End: 2022-05-20

## 2022-05-20 PROCEDURE — 99024 POSTOP FOLLOW-UP VISIT: CPT

## 2022-05-21 NOTE — HISTORY OF PRESENT ILLNESS
[FreeTextEntry1] : 60 yo  F /[facility] survivor with PMHx with NIDDM (on metformin), HLD, UC (on mesalamine), hypothyroidism and right breast intraductal papilloma s/p R WLE on 21, now with recent MR detected Stage IIB RIGHT breast invasive mammary carcinoma ([id number], ER+/IA+/Her2+, 7.8 cm superior pole).  Per Breast Surgeon (Dr. Irina Rodriguez), she is not a great candidate for BCS for 7.8 cm cancer and she was offered Right mastectomy. Pt desires mastectomy. She is now referred by her Breast Surgeon - Dr. Irina Rodriguez for discussion of reconstruction options.\par \par No prior breastfeeding history\par Current bra size: 42C\par Desired reconstructive volume: would like to be smaller and lifted\par C/o heavy breasts a/w shoulder pain and brassiere grooving\par \par Non-smoker\par No personal or family h/o DVT and MRSA \par Ht 5'0" Wt 168 lb BMI 32.8%\par Last Hb a1c 7.4% 2021\par Here today with her daughter, who she lives with\par Daughter is an RN\par \par Interval hx (22): Pt presents for follow up for review of FDA checklist and reconstructive volume goals. Would like to be smaller and lifted\par \par Interval hx (22). Patient presents today accompanied by her daughter POD#7 s/p right breast immediate reconstruction ([facility]) with direct-to-silicone implant (605cc) and Alloderm via SSM approach and concurrent left breast conservative reduction/mastopexy for symmetry. Doing well with improving incisional discomfort. Taking all prescribed medications. Denies any f/c or drainage. Drains functional. \par \par Interval hx (22): POD#16 s/p right breast immediate reconstruction ([facility]ilocks) with direct-to-silicone implant (605cc) and Alloderm via SSM approach and concurrent left breast conservative reduction/mastopexy for symmetry.   Reports taking tramadol x 2 yesterday for right upper breast. Denies fevers, chills, SOB, CP.  Completed abx on Wed. Right breast drain; 15/13/13/10.\par \par Interval hx (22): Pt presents today POD#23 s/p right breast immediate reconstruction ([place]il[facility]) with direct-to-silicone implant (605cc) and Alloderm via SSM approach and concurrent left breast conservative reduction/mastopexy for symmetry. Doing well. Denies any f/c or drainage. \par \par Interval hx (22): Pt presents today POD#29 s/p right breast immediate reconstruction ([place]) with direct-to-silicone implant (605cc) and Alloderm via SSM approach and concurrent left breast conservative reduction/mastopexy for symmetry. Performing daily LWC. Denies any f/c or drainage. Taking Doxycycline as prescribed. \par \par Interval hx (22): 5 weeks s/p  right breast immediate reconstruction ([facility]ilocks) with direct-to-silicone implant (605cc) and Alloderm via SSM approach and concurrent left breast conservative reduction/mastopexy for symmetry.  performing LWC as instructed except applying saline to free gauze..  Denies fevers, chills, swelling.  Here for right breast T-point wound follow up.

## 2022-05-21 NOTE — ASSESSMENT
[FreeTextEntry1] : 61 y/o F with h/o right breast intraductal papilloma s/p R WLE on 1/22/21, now with recent MR detected Stage IIB RIGHT breast invasive mammary carcinoma ([id number], ER+/MO+/Her2+, 7.8 cm superior pole), recommended to undergo skin-sparing mastectomy, which patient has confirmed is her desired oncologic plan.\par \par 5 weeks s/p right breast immediate reconstruction ([place]ilocks) with direct-to-silicone implant (605cc) and Alloderm via SSM approach and concurrent left breast conservative reduction/mastopexy for symmetry. \par Right breast incisional epidermolysis.  No signs of infection. No exposed implant. \par \par - minor debridement of eschar performed today \par - LWC with WTD BID right breast incision and T-point wound; counseled to remove gauze without wetting for mechanical debridement \par - Daily Aquaphor and Scarway to left breast incision\par - continue surgical bra \par - physical activity restrictions reviewed\par - post-op instructions discussed and all questions answered\par - Consult with RadOnc as scheduled\par - all questions were answered\par - f/u 2 weeks for right breast check \par \par Due to COVID-19, pre-visit patient instructions were explained to the patient and their symptoms were checked upon arrival. Masks were used by the healthcare provider and staff and the examination room was cleaned after the patient visit concluded

## 2022-05-21 NOTE — PHYSICAL EXAM
[de-identified] : well-appearing, NAD [de-identified] : nonlabored breathing [de-identified] : Left breast: soft, all incisions healing well, c/d/i, no erythema or fluid collection, minor superficial T-point wound with fibrinous base, now debrided, clean\par Right breast: implant soft, mastectomy flaps well perfused with good cap refill, vertical incision with dry fat eschare debrided back to healthy fat, no exposed inferior flap or implant; no fluid collection [de-identified] : FROM

## 2022-05-23 ENCOUNTER — NON-APPOINTMENT (OUTPATIENT)
Age: 62
End: 2022-05-23

## 2022-05-24 ENCOUNTER — APPOINTMENT (OUTPATIENT)
Dept: RADIATION ONCOLOGY | Facility: HOSPITAL | Age: 62
End: 2022-05-24
Payer: MEDICARE

## 2022-05-24 ENCOUNTER — NON-APPOINTMENT (OUTPATIENT)
Age: 62
End: 2022-05-24

## 2022-05-24 ENCOUNTER — OUTPATIENT (OUTPATIENT)
Dept: OUTPATIENT SERVICES | Facility: HOSPITAL | Age: 62
LOS: 1 days | End: 2022-05-24
Payer: MEDICARE

## 2022-05-24 VITALS
SYSTOLIC BLOOD PRESSURE: 142 MMHG | RESPIRATION RATE: 14 BRPM | TEMPERATURE: 97.2 F | OXYGEN SATURATION: 95 % | BODY MASS INDEX: 32.98 KG/M2 | DIASTOLIC BLOOD PRESSURE: 70 MMHG | HEART RATE: 108 BPM | WEIGHT: 168 LBS | HEIGHT: 60 IN

## 2022-05-24 DIAGNOSIS — Z98.890 OTHER SPECIFIED POSTPROCEDURAL STATES: Chronic | ICD-10-CM

## 2022-05-24 DIAGNOSIS — Z86.39 PERSONAL HISTORY OF OTHER ENDOCRINE, NUTRITIONAL AND METABOLIC DISEASE: ICD-10-CM

## 2022-05-24 DIAGNOSIS — D36.9 BENIGN NEOPLASM, UNSPECIFIED SITE: Chronic | ICD-10-CM

## 2022-05-24 DIAGNOSIS — Z90.49 ACQUIRED ABSENCE OF OTHER SPECIFIED PARTS OF DIGESTIVE TRACT: Chronic | ICD-10-CM

## 2022-05-24 DIAGNOSIS — C50.811 MALIGNANT NEOPLASM OF OVERLAPPING SITES OF RIGHT FEMALE BREAST: ICD-10-CM

## 2022-05-24 DIAGNOSIS — E78.5 HYPERLIPIDEMIA, UNSPECIFIED: ICD-10-CM

## 2022-05-24 DIAGNOSIS — Z90.89 ACQUIRED ABSENCE OF OTHER ORGANS: Chronic | ICD-10-CM

## 2022-05-24 DIAGNOSIS — Z98.891 HISTORY OF UTERINE SCAR FROM PREVIOUS SURGERY: Chronic | ICD-10-CM

## 2022-05-24 DIAGNOSIS — Z63.5 DISRUPTION OF FAMILY BY SEPARATION AND DIVORCE: ICD-10-CM

## 2022-05-24 PROCEDURE — 99205 OFFICE O/P NEW HI 60 MIN: CPT | Mod: 25

## 2022-05-24 RX ORDER — AMOXICILLIN AND CLAVULANATE POTASSIUM 875; 125 MG/1; MG/1
875-125 TABLET, COATED ORAL TWICE DAILY
Qty: 10 | Refills: 0 | Status: DISCONTINUED | COMMUNITY
Start: 2022-05-12 | End: 2022-05-24

## 2022-05-24 RX ORDER — GABAPENTIN 300 MG/1
300 CAPSULE ORAL
Qty: 7 | Refills: 0 | Status: DISCONTINUED | COMMUNITY
Start: 2022-04-06 | End: 2022-05-24

## 2022-05-24 RX ORDER — CELECOXIB 200 MG/1
200 CAPSULE ORAL TWICE DAILY
Qty: 8 | Refills: 0 | Status: DISCONTINUED | COMMUNITY
Start: 2022-04-06 | End: 2022-05-24

## 2022-05-24 RX ORDER — MESALAMINE 800 MG/1
800 TABLET, DELAYED RELEASE ORAL
Qty: 30 | Refills: 0 | Status: DISCONTINUED | COMMUNITY
Start: 2020-07-01 | End: 2022-05-24

## 2022-05-24 RX ORDER — DOXYCYCLINE HYCLATE 100 MG/1
100 TABLET ORAL
Qty: 14 | Refills: 0 | Status: DISCONTINUED | COMMUNITY
Start: 2022-05-06 | End: 2022-05-24

## 2022-05-24 RX ORDER — TRAMADOL HYDROCHLORIDE 50 MG/1
50 TABLET, COATED ORAL 4 TIMES DAILY
Qty: 16 | Refills: 0 | Status: DISCONTINUED | COMMUNITY
Start: 2022-04-13 | End: 2022-05-24

## 2022-05-24 SDOH — SOCIAL STABILITY - SOCIAL INSECURITY: DISRUPTION OF FAMILY BY SEPARATION AND DIVORCE: Z63.5

## 2022-05-25 ENCOUNTER — NON-APPOINTMENT (OUTPATIENT)
Age: 62
End: 2022-05-25

## 2022-05-30 NOTE — DISEASE MANAGEMENT
[Pathological] : TNM Stage: p [IB] : IB [FreeTextEntry4] : invasive ductal carcinoma of the right breast, G1, ER/IN positive / HER 2 negative / upper outer and upper inner quadrant [TTNM] : 3 [NTNM] : 1a [MTNM] : 0 [de-identified] : right breast

## 2022-05-30 NOTE — REVIEW OF SYSTEMS
[Skin Wound] : skin wound [Negative] : Psychiatric [Patient Intake Form Reviewed] : Patient intake form was reviewed [Fever] : no fever [Chills] : no chills [Fatigue] : no fatigue [Dysphagia] : no dysphagia [Chest Pain] : no chest pain [Palpitations] : no palpitations [Shortness Of Breath] : no shortness of breath [Wheezing] : no wheezing [Cough] : no cough [de-identified] : right reconstructed  breast

## 2022-05-30 NOTE — LETTER CLOSING
[Consult Closing:] : Thank you for allowing me to participate in the care of this patient.  If you have any questions, please do not hesitate to contact me. [Sincerely yours,] : Sincerely yours, [FreeTextEntry3] : Katelynn Salazar M.D. \par \par Electronically proofread and signed by:  Katelynn Salazar MD\par Attending, Department of Radiation Medicine\par [facility]\par \par CC: Dr. Brambila\par Dr. Martinez

## 2022-05-30 NOTE — PHYSICAL EXAM
[Sclera] : the sclera and conjunctiva were normal [Hearing Threshold Finger Rub Not [place]] : hearing was normal [] : no respiratory distress [Respiration, Rhythm And Depth] : normal respiratory rhythm and effort [Exaggerated Use Of Accessory Muscles For Inspiration] : no accessory muscle use [Heart Rate And Rhythm] : heart rate and rhythm were normal [Edema] : no peripheral edema present [Right Breast Absent] : a total mastectomy [Breast Reconstruction Right] : breast reconstruction [Breast Reconstruction Left] : breast reconstruction [No Masses] : no breast masses were palpable [Abdomen Soft] : soft [Nondistended] : nondistended [Abdomen Tenderness] : non-tender [Cervical Lymph Nodes Enlarged Posterior Bilaterally] : posterior cervical [Cervical Lymph Nodes Enlarged Anterior Bilaterally] : anterior cervical [Supraclavicular Lymph Nodes Enlarged Bilaterally] : supraclavicular [Skin Color & Pigmentation] : normal skin color and pigmentation [No Focal Deficits] : no focal deficits [Motor Exam] : the motor exam was normal [Enlargement Of The Right Breast] : no swelling [Tenderness Of The Right Breast] : no tenderness [___] :  no erythema [Enlargement Of The Left Breast] : no swelling [Tenderness Of The Left Breast] : no tenderness [Left Breast Absent] : no mastectomy [Axillary Lymph Nodes Enlarged Bilaterally] : no enlarged nodes [Normal] : no palpable adenopathy [de-identified] : Wet to dry dressing in place in right reconstructed IM fold.

## 2022-05-30 NOTE — HISTORY OF PRESENT ILLNESS
[FreeTextEntry1] : \par The patient is a 62 year old woman who was noted to have right nipple bloody discharge (10/2021).    Diagnostic mammogram and ultrasound on 11/16/2021 showed, there is architectural distortion in the retroareolar region from  benign excision ( 1/2021). There are scattered coarse benign appearing calcifications. No suspicious mass, microcalcifications or areas of architectural distortion is seen.Recommendation: Breast MRI is recommended for further evaluation of bloody nipple discharge. BI-RADS Category 2: Benign\par \par She had an MRI of bilateral breasts on 12/21/2021, which showed right breast, extensive regional non-mass enhancement in the right superior breast with areas of clumped irregular enhancement. This spans a distance of approximately 5.4 x 5.3 x 7.8 cm. MRI guided biopsies of the more irregular enhancement which is relatively medial as well as a lateral area of clumped non-mass enhancement are recommended. Enhancement also noted to extend to the nipple. No right axillary adenopathy.\par In the left breast, scattered foci of nonspecific enhancement in the left breast. Enlarged left axillary lymph nodes consistent with recent vaccine history. Low-lying left axillary lymph node also noted.\par Impression: Large irregular nonmass enhancement in the right superior breast as above. MRI guided biopsy of 2 areas is recommended. No MRI evidence of malignancy in the left breast.  BI-RADS Category 4: Suspicious\par \par On 2/25/2022, she had a biopsy of the right breast ( 2 sites ) abnormality and pathology showed at right lateral breast (site #1), invasive well differentiated mammary carcinoma with both tubular and lobular features associated with microcalcifications and signet ring cell formation, with DCIS, focal atypical lobular hyperplasia.  At right medial breast (site #2), invasive well differentiated mammary carcinoma with both tubular and lobular features associated with microcalcifications and signet ring cell formation, DCIS, focal atypical lobular hyperplasia.  Numerous foci of micropapillary atypical cell clusters within lymphovascular spaces. Lymphovascular invasion cannot be excluded.    It was ER/MT positive /HER 2 negative ( IHC & FISH).  Ki-67 index was 7-10 %.  \par \par  On 4/13/2022, She underwent a right total mastectomy with immediate reconstruction ([facility]), direct to silicone implant and left breast reduction for symmetry with Yanira Rodriguez and Kosta.  She was found to have a 5.4 cm  invasive ductal  carcinoma, with DCIS without EIC, ER/MT positive.  Surgical margins were negative.  There was 1/1 positive sentinel lymph node.  There was positive LVSI, no PNI. There was an additional 1/2 positive axillary lymph nodes \par \par She established care with Dr. Martinez, they discussed plan of care for systemic therapy, Letrozole  was recommended.  It appears patient will forgo chemotherapy.  Bone scan, CT C/A/P ordered, scheduled 6/11/2022 \par  \par She has been doing well since surgery. However, she has slow wound healing 2/2 to her DM II, with wet to dry dressing and wound packing.  She is here with her daughter to discuss radiation. \par \par Med/Onc: Dr. Martinez 8/31/2022\par Oncotype:   20\par Dr. Rodriguez 7/21/2022\par Plastic Sx 6/2/2022\par

## 2022-05-31 ENCOUNTER — NON-APPOINTMENT (OUTPATIENT)
Age: 62
End: 2022-05-31

## 2022-06-01 ENCOUNTER — NON-APPOINTMENT (OUTPATIENT)
Age: 62
End: 2022-06-01

## 2022-06-02 ENCOUNTER — APPOINTMENT (OUTPATIENT)
Dept: PLASTIC SURGERY | Facility: CLINIC | Age: 62
End: 2022-06-02
Payer: MEDICARE

## 2022-06-02 PROCEDURE — 99024 POSTOP FOLLOW-UP VISIT: CPT

## 2022-06-02 NOTE — PHYSICAL EXAM
[de-identified] : well-appearing, NAD [de-identified] : nonlabored breathing [de-identified] : Left breast: soft, all incisions healing well, c/d/i, no erythema or fluid collection, minor superficial T-point wound now completely healed\par Right breast: implant soft, mastectomy flaps well perfused with good cap refill, vertical incision with open wound 3x1.5 cm with healthy wound base, granulating, minor fibrinous tissue now debrided, no exposed inferior flap or implant; no fluid collection or cellulitis  [de-identified] : FROM

## 2022-06-02 NOTE — ASSESSMENT
[FreeTextEntry1] : 63 y/o F with h/o right breast intraductal papilloma s/p R WLE on 1/22/21, now with recent MR detected Stage IIB RIGHT breast invasive mammary carcinoma ([id number], ER+/NM+/Her2+, 7.8 cm superior pole), recommended to undergo skin-sparing mastectomy, which patient has confirmed is her desired oncologic plan.\par \par 7 weeks s/p right breast immediate reconstruction ([facility]) with direct-to-silicone implant (605cc) and Alloderm via SSM approach and concurrent left breast conservative reduction/mastopexy for symmetry. \par Right breast incisional epidermolysis debrided and healing well with LWC.  No signs of infection. No exposed implant. \par \par - minor debridement of right breast wound performed today \par - continue LWC with WTD BID right breast \par - Scarway to left breast incision\par - continue surgical bra \par - physical activity restrictions reviewed\par - post-op instructions discussed and all questions answered\par - Consult with RadOnc as scheduled\par - f/u with Dr. Brambila next week for right breast check and clearance for upcoming testing \par \par Due to COVID-19, pre-visit patient instructions were explained to the patient and their symptoms were checked upon arrival. Masks were used by the healthcare provider and staff and the examination room was cleaned after the patient visit concluded

## 2022-06-02 NOTE — HISTORY OF PRESENT ILLNESS
[FreeTextEntry1] : 60 yo  F /[facility] survivor with PMHx with NIDDM (on metformin), HLD, UC (on mesalamine), hypothyroidism and right breast intraductal papilloma s/p R WLE on 21, now with recent MR detected Stage IIB RIGHT breast invasive mammary carcinoma ([id number], ER+/[place]+/Her2+, 7.8 cm superior pole).  Per Breast Surgeon (Dr. Irina Rodriguez), she is not a great candidate for BCS for 7.8 cm cancer and she was offered Right mastectomy. Pt desires mastectomy. She is now referred by her Breast Surgeon - Dr. Irina Rodriguez for discussion of reconstruction options.\par \par No prior breastfeeding history\par Current bra size: 42C\par Desired reconstructive volume: would like to be smaller and lifted\par C/o heavy breasts a/w shoulder pain and brassiere grooving\par \par Non-smoker\par No personal or family h/o DVT and MRSA \par Ht 5'0" Wt 168 lb BMI 32.8%\par Last Hb a1c 7.4% 2021\par Here today with her daughter, who she lives with\par Daughter is an RN\par \par Interval hx (22): Pt presents for follow up for review of FDA checklist and reconstructive volume goals. Would like to be smaller and lifted\par \par Interval hx (22). Patient presents today accompanied by her daughter POD#7 s/p right breast immediate reconstruction ([facility]) with direct-to-silicone implant (605cc) and Alloderm via SSM approach and concurrent left breast conservative reduction/mastopexy for symmetry. Doing well with improving incisional discomfort. Taking all prescribed medications. Denies any f/c or drainage. Drains functional. \par \par Interval hx (22): POD#16 s/p right breast immediate reconstruction ([facility]) with direct-to-silicone implant (605cc) and Alloderm via SSM approach and concurrent left breast conservative reduction/mastopexy for symmetry.   Reports taking tramadol x 2 yesterday for right upper breast. Denies fevers, chills, SOB, CP.  Completed abx on Wed. Right breast drain; 15/13/13/10.\par \par Interval hx (22): Pt presents today POD#23 s/p right breast immediate reconstruction ([facility]) with direct-to-silicone implant (605cc) and Alloderm via SSM approach and concurrent left breast conservative reduction/mastopexy for symmetry. Doing well. Denies any f/c or drainage. \par \par Interval hx (22): Pt presents today POD#29 s/p right breast immediate reconstruction ([facility]) with direct-to-silicone implant (605cc) and Alloderm via SSM approach and concurrent left breast conservative reduction/mastopexy for symmetry. Performing daily LWC. Denies any f/c or drainage. Taking Doxycycline as prescribed. \par \par Interval hx (22): 5 weeks s/p  right breast immediate reconstruction ([facility]) with direct-to-silicone implant (605cc) and Alloderm via SSM approach and concurrent left breast conservative reduction/mastopexy for symmetry.  performing LWC as instructed except applying saline to free gauze..  Denies fevers, chills, swelling.  Here for right breast T-point wound follow up.\par \par Interval hx (22): 7 weeks s/p  right breast immediate reconstruction ([facility]) with direct-to-silicone implant (605cc) and Alloderm via SSM approach and concurrent left breast conservative reduction/mastopexy for symmetry. Performing LWC with NS WTD to right breast wound.  Denies fevers, chills, swelling or purulent drainage. Pleased with healing.

## 2022-06-03 NOTE — ASU DISCHARGE PLAN (ADULT/PEDIATRIC) - ASU DC SPECIAL INSTRUCTIONSFT
No Diet: You may resume your usual diet. Avoid alcohol and excessive salt intake for two weeks following surgery. This will help to minimize swelling.    Medication: Continue Gabapentin and Celebrex twice daily until complete. Take Tylenol 650mg every 6 hours. If pain is still not well controlled, take Tramadol as needed. Take all antibiotics as prescribed. Remember, no Aspirin or NSAIDS (Advil, Motrin, Aleve) as they may increase bruising.    Activity: Start walking as soon as possible to increase circulation and prevent blood clots. You may take care of your personal needs as desired, however, no lifting or strenuous activity is allowed for 4 weeks following surgery. Driving is not permitted for at least two weeks.    Wound care: Keep your dressing clean, dry, and intact until seen by MD/PA. Wear the surgical bra which will be provided to you at all times. Do not smoke! It delays wound healing and increases the risk of complications.    Drain care: Please monitor and record drain output daily. Keep an organized log, in cc or mL (not tsp or ounces), and bring this with you to your postoperative visit.    Personal Hygiene: Do not get the operative area wet. You are allowed to sponge bathe. Do not remove the surgical bra. No tub soaking.    Sun Exposure: You may be in the sun one month following surgery. Avoid sunburn. Always use a sunscreen of at least SPF30.    Things to expect: The operative area may be bruised, swollen, and painful. You may also have temporary numbness which will improve over time.    In case of emergency: call the office any time day or night. Post-operative care will be provided in the office one week following surgery. If you do not already have an appointment, please call during regular office hours to schedule: [phone].     We wish you a pleasant recovery.

## 2022-06-10 ENCOUNTER — APPOINTMENT (OUTPATIENT)
Dept: PLASTIC SURGERY | Facility: CLINIC | Age: 62
End: 2022-06-10
Payer: MEDICARE

## 2022-06-10 PROCEDURE — 99024 POSTOP FOLLOW-UP VISIT: CPT

## 2022-06-10 NOTE — PHYSICAL EXAM
[de-identified] : well-appearing, NAD [de-identified] : nonlabored breathing [de-identified] : Left breast: soft, all incisions healing well, c/d/i, no erythema or fluid collection, minor superficial T-point wound now completely healed\par Right breast: implant soft, mastectomy flaps well perfused with good cap refill, vertical incision with open wound 3x1.5 cm with healthy wound base, granulating, minor fibrinous tissue now debrided, no exposed inferior flap or implant; no fluid collection or cellulitis  [de-identified] : FROM

## 2022-06-10 NOTE — ASSESSMENT
[FreeTextEntry1] : 61 y/o F with h/o right breast intraductal papilloma s/p R WLE on 1/22/21, now with recent MR detected Stage IIB RIGHT breast invasive mammary carcinoma ([id number], ER+/[place]+/Her2+, 7.8 cm superior pole), recommended to undergo skin-sparing mastectomy, which patient has confirmed is her desired oncologic plan.\par \par 8 weeks s/p right breast immediate reconstruction ([facility]) with direct-to-silicone implant (605cc) and Alloderm via SSM approach and concurrent left breast conservative reduction/mastopexy for symmetry. \par Right breast incisional epidermolysis debrided and healing well with LWC.  No signs of infection. No exposed implant. \par \par - continue LWC with WTD BID right breast \par - Scarway to left breast incision\par - continue surgical bra \par - physical activity restrictions reviewed\par - post-op instructions discussed and all questions answered\par - f/u 2 weeks for right breast check, possible consideration for tertiary primary closure of right breast wound to expedite time to XRT.\par \par Due to COVID-19, pre-visit patient instructions were explained to the patient and their symptoms were checked upon arrival. Masks were used by the healthcare provider and staff and the examination room was cleaned after the patient visit concluded

## 2022-06-10 NOTE — HISTORY OF PRESENT ILLNESS
[FreeTextEntry1] : 60 yo  F /[facility] survivor with PMHx with NIDDM (on metformin), HLD, UC (on mesalamine), hypothyroidism and right breast intraductal papilloma s/p R WLE on 21, now with recent MR detected Stage IIB RIGHT breast invasive mammary carcinoma ([id number], ER+/[place]+/Her2+, 7.8 cm superior pole).  Per Breast Surgeon (Dr. Irina Rodriguez), she is not a great candidate for BCS for 7.8 cm cancer and she was offered Right mastectomy. Pt desires mastectomy. She is now referred by her Breast Surgeon - Dr. Irina Rodriguez for discussion of reconstruction options.\par \par No prior breastfeeding history\par Current bra size: 42C\par Desired reconstructive volume: would like to be smaller and lifted\par C/o heavy breasts a/w shoulder pain and brassiere grooving\par \par Non-smoker\par No personal or family h/o DVT and MRSA \par Ht 5'0" Wt 168 lb BMI 32.8%\par Last Hb a1c 7.4% 2021\par Here today with her daughter, who she lives with\par Daughter is an RN\par \par Interval hx (22): Pt presents for follow up for review of FDA checklist and reconstructive volume goals. Would like to be smaller and lifted\par \par Interval hx (22). Patient presents today accompanied by her daughter POD#7 s/p right breast immediate reconstruction ([facility]) with direct-to-silicone implant (605cc) and Alloderm via SSM approach and concurrent left breast conservative reduction/mastopexy for symmetry. Doing well with improving incisional discomfort. Taking all prescribed medications. Denies any f/c or drainage. Drains functional. \par \par Interval hx (22): POD#16 s/p right breast immediate reconstruction ([facility]) with direct-to-silicone implant (605cc) and Alloderm via SSM approach and concurrent left breast conservative reduction/mastopexy for symmetry.   Reports taking tramadol x 2 yesterday for right upper breast. Denies fevers, chills, SOB, CP.  Completed abx on Wed. Right breast drain; 15/13/13/10.\par \par Interval hx (22): Pt presents today POD#23 s/p right breast immediate reconstruction ([facility]) with direct-to-silicone implant (605cc) and Alloderm via SSM approach and concurrent left breast conservative reduction/mastopexy for symmetry. Doing well. Denies any f/c or drainage. \par \par Interval hx (22): Pt presents today POD#29 s/p right breast immediate reconstruction ([facility]) with direct-to-silicone implant (605cc) and Alloderm via SSM approach and concurrent left breast conservative reduction/mastopexy for symmetry. Performing daily LWC. Denies any f/c or drainage. Taking Doxycycline as prescribed. \par \par Interval hx (22): 5 weeks s/p  right breast immediate reconstruction ([facility]) with direct-to-silicone implant (605cc) and Alloderm via SSM approach and concurrent left breast conservative reduction/mastopexy for symmetry.  performing LWC as instructed except applying saline to free gauze..  Denies fevers, chills, swelling.  Here for right breast T-point wound follow up.\par \par Interval hx (22): 7 weeks s/p  right breast immediate reconstruction ([facility]) with direct-to-silicone implant (605cc) and Alloderm via SSM approach and concurrent left breast conservative reduction/mastopexy for symmetry. Performing LWC with NS WTD to right breast wound.  Denies fevers, chills, swelling or purulent drainage. Pleased with healing. \par \par Interval hx (6/10/22): 8 weeks s/p  right breast immediate reconstruction ([facility]) with direct-to-silicone implant (605cc) and Alloderm via SSM approach and concurrent left breast conservative reduction/mastopexy for symmetry. Performing LWC with NS WTD to right breast wound.  Denies fevers, chills, swelling or purulent drainage. Feels the healing is slow for the right breast.  She to undergo 5 weeks of XRT.  No chemotherapy needed.

## 2022-06-11 ENCOUNTER — RESULT REVIEW (OUTPATIENT)
Age: 62
End: 2022-06-11

## 2022-06-11 ENCOUNTER — OUTPATIENT (OUTPATIENT)
Dept: OUTPATIENT SERVICES | Facility: HOSPITAL | Age: 62
LOS: 1 days | Discharge: HOME | End: 2022-06-11
Payer: MEDICARE

## 2022-06-11 ENCOUNTER — OUTPATIENT (OUTPATIENT)
Dept: OUTPATIENT SERVICES | Facility: HOSPITAL | Age: 62
LOS: 1 days | Discharge: HOME | End: 2022-06-11

## 2022-06-11 DIAGNOSIS — Z98.890 OTHER SPECIFIED POSTPROCEDURAL STATES: Chronic | ICD-10-CM

## 2022-06-11 DIAGNOSIS — Z98.891 HISTORY OF UTERINE SCAR FROM PREVIOUS SURGERY: Chronic | ICD-10-CM

## 2022-06-11 DIAGNOSIS — D36.9 BENIGN NEOPLASM, UNSPECIFIED SITE: Chronic | ICD-10-CM

## 2022-06-11 DIAGNOSIS — Z90.49 ACQUIRED ABSENCE OF OTHER SPECIFIED PARTS OF DIGESTIVE TRACT: Chronic | ICD-10-CM

## 2022-06-11 DIAGNOSIS — C50.811 MALIGNANT NEOPLASM OF OVERLAPPING SITES OF RIGHT FEMALE BREAST: ICD-10-CM

## 2022-06-11 DIAGNOSIS — Z90.89 ACQUIRED ABSENCE OF OTHER ORGANS: Chronic | ICD-10-CM

## 2022-06-11 PROCEDURE — 74177 CT ABD & PELVIS W/CONTRAST: CPT | Mod: 26

## 2022-06-11 PROCEDURE — 71270 CT THORAX DX C-/C+: CPT | Mod: 26

## 2022-06-13 DIAGNOSIS — K51.90 ULCERATIVE COLITIS, UNSPECIFIED, WITHOUT COMPLICATIONS: ICD-10-CM

## 2022-06-13 DIAGNOSIS — N95.9 UNSPECIFIED MENOPAUSAL AND PERIMENOPAUSAL DISORDER: ICD-10-CM

## 2022-06-13 DIAGNOSIS — Z13.820 ENCOUNTER FOR SCREENING FOR OSTEOPOROSIS: ICD-10-CM

## 2022-06-14 ENCOUNTER — NON-APPOINTMENT (OUTPATIENT)
Age: 62
End: 2022-06-14

## 2022-06-14 PROCEDURE — 77263 THER RADIOLOGY TX PLNG CPLX: CPT

## 2022-06-21 ENCOUNTER — NON-APPOINTMENT (OUTPATIENT)
Age: 62
End: 2022-06-21

## 2022-06-23 ENCOUNTER — NON-APPOINTMENT (OUTPATIENT)
Age: 62
End: 2022-06-23

## 2022-06-24 ENCOUNTER — APPOINTMENT (OUTPATIENT)
Dept: PLASTIC SURGERY | Facility: CLINIC | Age: 62
End: 2022-06-24
Payer: MEDICARE

## 2022-06-24 PROCEDURE — 99024 POSTOP FOLLOW-UP VISIT: CPT

## 2022-06-24 NOTE — HISTORY OF PRESENT ILLNESS
[FreeTextEntry1] : 60 yo  F /[facility] survivor with PMHx with NIDDM (on metformin), HLD, UC (on mesalamine), hypothyroidism and right breast intraductal papilloma s/p R WLE on 21, now with recent MR detected Stage IIB RIGHT breast invasive mammary carcinoma ([id number], ER+/[place]+/Her2+, 7.8 cm superior pole).  Per Breast Surgeon (Dr. Irina Rodriguez), she is not a great candidate for BCS for 7.8 cm cancer and she was offered Right mastectomy. Pt desires mastectomy. She is now referred by her Breast Surgeon - Dr. Irina Rodriguez for discussion of reconstruction options.\par \par No prior breastfeeding history\par Current bra size: 42C\par Desired reconstructive volume: would like to be smaller and lifted\par C/o heavy breasts a/w shoulder pain and brassiere grooving\par \par Non-smoker\par No personal or family h/o DVT and MRSA \par Ht 5'0" Wt 168 lb BMI 32.8%\par Last Hb a1c 7.4% 2021\par Here today with her daughter, who she lives with\par Daughter is an RN\par \par Interval hx (22): Pt presents for follow up for review of FDA checklist and reconstructive volume goals. Would like to be smaller and lifted\par \par Interval hx (22). Patient presents today accompanied by her daughter POD#7 s/p right breast immediate reconstruction ([facility]) with direct-to-silicone implant (605cc) and Alloderm via SSM approach and concurrent left breast conservative reduction/mastopexy for symmetry. Doing well with improving incisional discomfort. Taking all prescribed medications. Denies any f/c or drainage. Drains functional. \par \par Interval hx (22): POD#16 s/p right breast immediate reconstruction ([facility]) with direct-to-silicone implant (605cc) and Alloderm via SSM approach and concurrent left breast conservative reduction/mastopexy for symmetry.   Reports taking tramadol x 2 yesterday for right upper breast. Denies fevers, chills, SOB, CP.  Completed abx on Wed. Right breast drain; 15/13/13/10.\par \par Interval hx (22): Pt presents today POD#23 s/p right breast immediate reconstruction ([facility]) with direct-to-silicone implant (605cc) and Alloderm via SSM approach and concurrent left breast conservative reduction/mastopexy for symmetry. Doing well. Denies any f/c or drainage. \par \par Interval hx (22): Pt presents today POD#29 s/p right breast immediate reconstruction ([facility]) with direct-to-silicone implant (605cc) and Alloderm via SSM approach and concurrent left breast conservative reduction/mastopexy for symmetry. Performing daily LWC. Denies any f/c or drainage. Taking Doxycycline as prescribed. \par \par Interval hx (22): 5 weeks s/p  right breast immediate reconstruction ([facility]) with direct-to-silicone implant (605cc) and Alloderm via SSM approach and concurrent left breast conservative reduction/mastopexy for symmetry.  performing LWC as instructed except applying saline to free gauze..  Denies fevers, chills, swelling.  Here for right breast T-point wound follow up.\par \par Interval hx (22): 7 weeks s/p  right breast immediate reconstruction ([facility]) with direct-to-silicone implant (605cc) and Alloderm via SSM approach and concurrent left breast conservative reduction/mastopexy for symmetry. Performing LWC with NS WTD to right breast wound.  Denies fevers, chills, swelling or purulent drainage. Pleased with healing. \par \par Interval hx (6/10/22): 8 weeks s/p  right breast immediate reconstruction ([facility]) with direct-to-silicone implant (605cc) and Alloderm via SSM approach and concurrent left breast conservative reduction/mastopexy for symmetry. Performing LWC with NS WTD to right breast wound.  Denies fevers, chills, swelling or purulent drainage. Feels the healing is slow for the right breast.  She to undergo 5 weeks of XRT.  No chemotherapy needed.\par \par Interval hx (22) 10 weeks  s/p  right breast immediate reconstruction ([facility]) with direct-to-silicone implant (605cc) and Alloderm via SSM approach and concurrent left breast conservative reduction/mastopexy for symmetry. Performing LWC with NS WTD to right breast wound.  Wearing surgical; daughter feels that it is aiding  wound contracture.  Wound more shallow and smaller.

## 2022-06-24 NOTE — ASSESSMENT
[FreeTextEntry1] : 63 y/o F with h/o right breast intraductal papilloma s/p R WLE on 1/22/21, now with recent MR detected Stage IIB RIGHT breast invasive mammary carcinoma ([id number], ER+/[place]+/Her2+, 7.8 cm superior pole), recommended to undergo skin-sparing mastectomy, which patient has confirmed is her desired oncologic plan.\par \par 8 weeks s/p right breast immediate reconstruction ([facility]) with direct-to-silicone implant (605cc) and Alloderm via SSM approach and concurrent left breast conservative reduction/mastopexy for symmetry. \par Right breast incisional epidermolysis debrided and healing well with LWC.  No signs of infection. No exposed implant. \par \par - continue LWC with WTD BID right breast \par - Scarway to left breast incision\par - continue surgical bra \par - physical activity restrictions reviewed\par - post-op instructions discussed and all questions answered\par - f/u 2 weeks for right breast check, no indication today for delayed prior closure or FTSG--- greater risk of secondary infection and shape distortion; and skin graft failure with XRT\par \par Due to COVID-19, pre-visit patient instructions were explained to the patient and their symptoms were checked upon arrival. Masks were used by the healthcare provider and staff and the examination room was cleaned after the patient visit concluded

## 2022-06-24 NOTE — PHYSICAL EXAM
[de-identified] : well-appearing, NAD [de-identified] : nonlabored breathing [de-identified] : Left breast: soft, all incisions healing well, c/d/i, no erythema or fluid collection, minor superficial T-point wound now completely healed\par Right breast: implant soft, mastectomy flaps well perfused with good cap refill, vertical incision with open wound 2x1.2 cm with healthy wound base, granulating, minor fibrinous tissue now debrided, no exposed inferior flap or implant; no fluid collection or cellulitis  [de-identified] : FROM

## 2022-06-29 PROCEDURE — 77334 RADIATION TREATMENT AID(S): CPT | Mod: 26

## 2022-06-29 PROCEDURE — 77290 THER RAD SIMULAJ FIELD CPLX: CPT | Mod: 26

## 2022-07-07 PROCEDURE — 77334 RADIATION TREATMENT AID(S): CPT | Mod: 26

## 2022-07-07 PROCEDURE — 77295 3-D RADIOTHERAPY PLAN: CPT | Mod: 26

## 2022-07-07 PROCEDURE — 77300 RADIATION THERAPY DOSE PLAN: CPT | Mod: 26

## 2022-07-08 ENCOUNTER — APPOINTMENT (OUTPATIENT)
Dept: PLASTIC SURGERY | Facility: CLINIC | Age: 62
End: 2022-07-08

## 2022-07-08 PROCEDURE — 99024 POSTOP FOLLOW-UP VISIT: CPT

## 2022-07-09 NOTE — HISTORY OF PRESENT ILLNESS
[FreeTextEntry1] : 62 yo  F /[facility] survivor with PMHx with NIDDM (on metformin), HLD, UC (on mesalamine), hypothyroidism and right breast intraductal papilloma s/p R WLE on 21, now with recent MR detected Stage IIB RIGHT breast invasive mammary carcinoma ([id number], ER+/VT+/Her2+, 7.8 cm superior pole).  Per Breast Surgeon (Dr. Irina Rodriguez), she is not a great candidate for BCS for 7.8 cm cancer and she was offered Right mastectomy. Pt desires mastectomy. She is now referred by her Breast Surgeon - Dr. Irina Rodriguez for discussion of reconstruction options.\par \par No prior breastfeeding history\par Current bra size: 42C\par Desired reconstructive volume: would like to be smaller and lifted\par C/o heavy breasts a/w shoulder pain and brassiere grooving\par \par Non-smoker\par No personal or family h/o DVT and MRSA \par Ht 5'0" Wt 168 lb BMI 32.8%\par Last Hb a1c 7.4% 2021\par Here today with her daughter, who she lives with\par Daughter is an RN\par \par Interval hx (22): Pt presents for follow up for review of FDA checklist and reconstructive volume goals. Would like to be smaller and lifted\par \par Interval hx (22). Patient presents today accompanied by her daughter POD#7 s/p right breast immediate reconstruction ([facility]) with direct-to-silicone implant (605cc) and Alloderm via SSM approach and concurrent left breast conservative reduction/mastopexy for symmetry. Doing well with improving incisional discomfort. Taking all prescribed medications. Denies any f/c or drainage. Drains functional. \par \par Interval hx (22): POD#16 s/p right breast immediate reconstruction ([facility]) with direct-to-silicone implant (605cc) and Alloderm via SSM approach and concurrent left breast conservative reduction/mastopexy for symmetry.   Reports taking tramadol x 2 yesterday for right upper breast. Denies fevers, chills, SOB, CP.  Completed abx on Wed. Right breast drain; 15/13/13/10.\par \par Interval hx (22): Pt presents today POD#23 s/p right breast immediate reconstruction ([facility]) with direct-to-silicone implant (605cc) and Alloderm via SSM approach and concurrent left breast conservative reduction/mastopexy for symmetry. Doing well. Denies any f/c or drainage. \par \par Interval hx (22): Pt presents today POD#29 s/p right breast immediate reconstruction ([facility]) with direct-to-silicone implant (605cc) and Alloderm via SSM approach and concurrent left breast conservative reduction/mastopexy for symmetry. Performing daily LWC. Denies any f/c or drainage. Taking Doxycycline as prescribed. \par \par Interval hx (22): 5 weeks s/p  right breast immediate reconstruction ([facility]) with direct-to-silicone implant (605cc) and Alloderm via SSM approach and concurrent left breast conservative reduction/mastopexy for symmetry.  performing LWC as instructed except applying saline to free gauze..  Denies fevers, chills, swelling.  Here for right breast T-point wound follow up.\par \par Interval hx (22): 7 weeks s/p  right breast immediate reconstruction ([facility]) with direct-to-silicone implant (605cc) and Alloderm via SSM approach and concurrent left breast conservative reduction/mastopexy for symmetry. Performing LWC with NS WTD to right breast wound.  Denies fevers, chills, swelling or purulent drainage. Pleased with healing. \par \par Interval hx (6/10/22): 8 weeks s/p  right breast immediate reconstruction ([facility]) with direct-to-silicone implant (605cc) and Alloderm via SSM approach and concurrent left breast conservative reduction/mastopexy for symmetry. Performing LWC with NS WTD to right breast wound.  Denies fevers, chills, swelling or purulent drainage. Feels the healing is slow for the right breast.  She to undergo 5 weeks of XRT.  No chemotherapy needed.\par \par Interval hx (22) 10 weeks  s/p  right breast immediate reconstruction ([facility]) with direct-to-silicone implant (605cc) and Alloderm via SSM approach and concurrent left breast conservative reduction/mastopexy for symmetry. Performing LWC with NS WTD to right breast wound.  Wearing surgical; daughter feels that it is aiding  wound contracture.  Wound more shallow and smaller. \par \par Itnerval hx (22):  12 weeks s/p right breast immediate reconstruction ([facility]) with direct-to-silicone implant (605cc) and Alloderm via SSM approach and concurrent left breast conservative reduction/mastopexy for symmetry. Vertical wound of right breast healed after LWC.  No longer drainage on dressing.  applying aquaphor.   Here for clearance for XRT therapy

## 2022-07-09 NOTE — ASSESSMENT
[FreeTextEntry1] : 63 y/o F with h/o right breast intraductal papilloma s/p R WLE on 1/22/21, now with recent MR detected Stage IIB RIGHT breast invasive mammary carcinoma ([id number], ER+/LA+/Her2+, 7.8 cm superior pole), recommended to undergo skin-sparing mastectomy, which patient has confirmed is her desired oncologic plan.\par \par 12 weeks s/p right breast immediate reconstruction ([facility]) with direct-to-silicone implant (605cc) and Alloderm via SSM approach and concurrent left breast conservative reduction/mastopexy for symmetry. \par Right breast incisional epidermolysis debrided and healing well with LWC.  No signs of infection. No exposed implant. \par \par Completely healed right vertical scar\par \par Cleared from PRS standpoint to start XRT\par \par - DC LWC with WTD BID right breast \par - [place] to left breast incision\par - continue surgical bra or support bras\par - may resume all physical activity in 2 weeks\par - post-op instructions discussed and all questions answered\par - f/u 2 months for reconstruction check\par \par Due to COVID-19, pre-visit patient instructions were explained to the patient and their symptoms were checked upon arrival. Masks were used by the healthcare provider and staff and the examination room was cleaned after the patient visit concluded

## 2022-07-09 NOTE — PHYSICAL EXAM
[de-identified] : well-appearing, NAD [de-identified] : nonlabored breathing [de-identified] : Left breast: soft, all incisions healing well, c/d/i, no erythema or fluid collection, minor superficial T-point wound now completely healed\par Right breast: implant soft, mastectomy flaps well perfused with good cap refill, vertical incision with epithelialized wound 2x1.2 cm, no exposed inferior flap or implant; no fluid collection or cellulitis  [de-identified] : FROM

## 2022-07-14 ENCOUNTER — NON-APPOINTMENT (OUTPATIENT)
Age: 62
End: 2022-07-14

## 2022-07-15 ENCOUNTER — NON-APPOINTMENT (OUTPATIENT)
Age: 62
End: 2022-07-15

## 2022-07-18 PROCEDURE — 77334 RADIATION TREATMENT AID(S): CPT | Mod: 26

## 2022-07-18 PROCEDURE — 77280 THER RAD SIMULAJ FIELD SMPL: CPT | Mod: 26

## 2022-07-19 PROCEDURE — 77387C: CUSTOM

## 2022-07-21 ENCOUNTER — NON-APPOINTMENT (OUTPATIENT)
Age: 62
End: 2022-07-21

## 2022-07-21 PROCEDURE — 77387C: CUSTOM

## 2022-07-22 PROCEDURE — 77387C: CUSTOM

## 2022-07-25 ENCOUNTER — NON-APPOINTMENT (OUTPATIENT)
Age: 62
End: 2022-07-25

## 2022-07-25 VITALS
WEIGHT: 169 LBS | DIASTOLIC BLOOD PRESSURE: 66 MMHG | OXYGEN SATURATION: 96 % | BODY MASS INDEX: 33.01 KG/M2 | SYSTOLIC BLOOD PRESSURE: 144 MMHG | HEART RATE: 96 BPM | RESPIRATION RATE: 12 BRPM | TEMPERATURE: 96.6 F

## 2022-07-25 DIAGNOSIS — S21.009A UNSPECIFIED OPEN WOUND OF UNSPECIFIED BREAST, INITIAL ENCOUNTER: ICD-10-CM

## 2022-07-25 PROCEDURE — 77387C: CUSTOM

## 2022-07-25 NOTE — REVIEW OF SYSTEMS
[Fatigue: Grade 0] : Fatigue: Grade 0 [Breast Pain: Grade 0] : Breast Pain: Grade 0 [Pruritus: Grade 1 - Mild or localized; topical intervention indicated] : Pruritus: Grade 1 - Mild or localized; topical intervention indicated [Dermatitis Radiation: Grade 1 - Faint erythema or dry desquamation] : Dermatitis Radiation: Grade 1 - Faint erythema or dry desquamation

## 2022-07-25 NOTE — DISEASE MANAGEMENT
[Pathological] : TNM Stage: p [IB] : IB [FreeTextEntry4] : invasive ductal carcinoma of the right breast, G1, ER/WV positive / HER 2 negative / upper outer and upper inner quadrant [TTNM] : 3 [NTNM] : 1a [MTNM] : 0 [de-identified] : 800cGy [de-identified] : 5000cGy [de-identified] : right breast

## 2022-07-25 NOTE — PHYSICAL EXAM
[Sclera] : the sclera and conjunctiva were normal [Hearing Threshold Finger Rub Not [place]] : hearing was normal [] : no respiratory distress [Respiration, Rhythm And Depth] : normal respiratory rhythm and effort [Exaggerated Use Of Accessory Muscles For Inspiration] : no accessory muscle use [Heart Rate And Rhythm] : heart rate and rhythm were normal [___] : a [unfilled] ~Ucm mastectomy scar [Breast Reconstruction Right] : breast reconstruction [Nail Clubbing] : no clubbing  or cyanosis of the fingernails [Motor Tone] : muscle strength and tone were normal [No Focal Deficits] : no focal deficits [Motor Exam] : the motor exam was normal [Normal] : oriented to person, place and time, the affect was normal, the mood was normal and not anxious [Enlargement Of The Right Breast] : no swelling [Tenderness Of The Right Breast] : no tenderness [Right Breast Absent] : no mastectomy [de-identified] : no right nipple, wound healing

## 2022-07-25 NOTE — HISTORY OF PRESENT ILLNESS
[FreeTextEntry1] : Patient complaining of mild pruritus at the upper mid chest region.   She is moisturizing daily with Aquaphor. Denies CW/recon breast pain. However, she has tiny amount of drainage at the wound site that is staining the gauze.  No new concerns.   She continues taking Letrozole while on XRT.

## 2022-07-26 PROCEDURE — 77427 RADIATION TX MANAGEMENT X5: CPT

## 2022-07-27 PROCEDURE — 77387C: CUSTOM

## 2022-07-28 PROCEDURE — 77387C: CUSTOM

## 2022-07-29 PROCEDURE — 77387C: CUSTOM

## 2022-08-01 ENCOUNTER — NON-APPOINTMENT (OUTPATIENT)
Age: 62
End: 2022-08-01

## 2022-08-01 VITALS
BODY MASS INDEX: 33.1 KG/M2 | OXYGEN SATURATION: 97 % | WEIGHT: 169.5 LBS | SYSTOLIC BLOOD PRESSURE: 116 MMHG | TEMPERATURE: 96.4 F | RESPIRATION RATE: 16 BRPM | DIASTOLIC BLOOD PRESSURE: 78 MMHG | HEART RATE: 89 BPM

## 2022-08-01 PROCEDURE — 77387C: CUSTOM

## 2022-08-01 RX ORDER — ONDANSETRON 4 MG/1
4 TABLET ORAL
Qty: 21 | Refills: 0 | Status: ACTIVE | COMMUNITY
Start: 2022-04-13

## 2022-08-01 RX ORDER — GLIMEPIRIDE 2 MG/1
2 TABLET ORAL
Qty: 90 | Refills: 0 | Status: ACTIVE | COMMUNITY
Start: 2022-04-28

## 2022-08-01 RX ORDER — PANTOPRAZOLE 40 MG/1
40 TABLET, DELAYED RELEASE ORAL
Qty: 90 | Refills: 0 | Status: ACTIVE | COMMUNITY
Start: 2021-09-13

## 2022-08-01 RX ORDER — DOXYCYCLINE HYCLATE 100 MG/1
100 CAPSULE ORAL
Qty: 28 | Refills: 0 | Status: DISCONTINUED | COMMUNITY
Start: 2022-04-13

## 2022-08-01 NOTE — HISTORY OF PRESENT ILLNESS
[FreeTextEntry1] : Patient reports doing well. She denies pain or discomfort at treatment site. She is applying cortisone for mild pruritus with relief. She continues to moisturize with Aquaphor.

## 2022-08-01 NOTE — DISEASE MANAGEMENT
[Pathological] : TNM Stage: p [IB] : IB [FreeTextEntry4] : invasive ductal carcinoma of the right breast, G1, ER/MT positive / HER 2 negative / upper outer and upper inner quadrant [TTNM] : 3 [NTNM] : 1a [MTNM] : 0 [de-identified] : 1800cGy [de-identified] : 5000cGy [de-identified] : right breast

## 2022-08-01 NOTE — PHYSICAL EXAM
[Sclera] : the sclera and conjunctiva were normal [] : no respiratory distress [Exaggerated Use Of Accessory Muscles For Inspiration] : no accessory muscle use [Normal] : oriented to person, place and time, the affect was normal, the mood was normal and not anxious [de-identified] : mild erythema in radiation field.  No desquamation.

## 2022-08-01 NOTE — REVIEW OF SYSTEMS
[Breast Pain: Grade 0] : Breast Pain: Grade 0 [Pruritus: Grade 1 - Mild or localized; topical intervention indicated] : Pruritus: Grade 1 - Mild or localized; topical intervention indicated [Dermatitis Radiation: Grade 1 - Faint erythema or dry desquamation] : Dermatitis Radiation: Grade 1 - Faint erythema or dry desquamation [Fatigue: Grade 1 - Fatigue relieved by rest] : Fatigue: Grade 1 - Fatigue relieved by rest

## 2022-08-02 ENCOUNTER — RESULT REVIEW (OUTPATIENT)
Age: 62
End: 2022-08-02

## 2022-08-02 ENCOUNTER — OUTPATIENT (OUTPATIENT)
Dept: OUTPATIENT SERVICES | Facility: HOSPITAL | Age: 62
LOS: 1 days | Discharge: HOME | End: 2022-08-02

## 2022-08-02 ENCOUNTER — APPOINTMENT (OUTPATIENT)
Dept: BREAST CENTER | Facility: CLINIC | Age: 62
End: 2022-08-02

## 2022-08-02 VITALS
WEIGHT: 166 LBS | BODY MASS INDEX: 32.59 KG/M2 | HEIGHT: 60 IN | SYSTOLIC BLOOD PRESSURE: 120 MMHG | DIASTOLIC BLOOD PRESSURE: 75 MMHG

## 2022-08-02 DIAGNOSIS — Z98.891 HISTORY OF UTERINE SCAR FROM PREVIOUS SURGERY: Chronic | ICD-10-CM

## 2022-08-02 DIAGNOSIS — C50.811 MALIGNANT NEOPLASM OF OVERLAPPING SITES OF RIGHT FEMALE BREAST: ICD-10-CM

## 2022-08-02 DIAGNOSIS — D36.9 BENIGN NEOPLASM, UNSPECIFIED SITE: Chronic | ICD-10-CM

## 2022-08-02 DIAGNOSIS — Z90.49 ACQUIRED ABSENCE OF OTHER SPECIFIED PARTS OF DIGESTIVE TRACT: Chronic | ICD-10-CM

## 2022-08-02 DIAGNOSIS — Z90.89 ACQUIRED ABSENCE OF OTHER ORGANS: Chronic | ICD-10-CM

## 2022-08-02 DIAGNOSIS — Z98.890 OTHER SPECIFIED POSTPROCEDURAL STATES: Chronic | ICD-10-CM

## 2022-08-02 PROCEDURE — 76642 ULTRASOUND BREAST LIMITED: CPT | Mod: 26,RT

## 2022-08-02 PROCEDURE — 77427 RADIATION TX MANAGEMENT X5: CPT

## 2022-08-02 PROCEDURE — 99213 OFFICE O/P EST LOW 20 MIN: CPT

## 2022-08-03 PROCEDURE — 77387C: CUSTOM

## 2022-08-04 PROCEDURE — 77387C: CUSTOM

## 2022-08-05 PROCEDURE — 77387C: CUSTOM

## 2022-08-05 NOTE — ASSESSMENT
[FreeTextEntry1] : Imtiaz is a 62 F with a Right breast intraductal papilloma, s/p R WLE on 1/22/2021, now with a MR detected RIGHT breast invasive mammary carcinoma, [id number], ER/DE pos, her 2 neg, anatomic stage IIIA, prognostic stage IB, s/p R mastectomy, SLN Bx, with immediate reconstruction (goldilocks) with direct to silicone implant and concurrent L breast reduction/mastopexy for symmetry on 4/13/2022, AJCC 8th edition. \par \par 4/13/2022 -- R mastectomy, SLN Bx, with immediate reconstruction (goldilocks) with direct to silicone implant and concurrent L breast reduction/mastopexy for symmetry\par 1. 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 R SLN Bx \par -1/1 SLN positive for mets, measuring 5.5 mm, metastatic tumor cell present within the lymphovascular spaces of the extracapsular soft tissue \par -1/2 non sentinel nodes positive for micrometastatic carcinoma, no CAROLINE \par 3. R additional tissue and skin \par -benign \par 4. left breast contents \par -benign with some areas of proliferative type changes \par [id number], ER/DE pos, Her 2 neg, anatomic stage IIIA, prognostic stage 1B\par \par 1/22/2021 -- R WLE \par -sclerosing papilloma \par -atypical ductal hyperplasia \par \par ON exam, in her right breast @8N3-5, she has a 3 x 3 cm area of induration without any overlying skin changes, erythema or ulceration; otherwise her surgical sites are well healed, she has minimal post radiation changes \par \par A targeted R breast US was performed immediately following the consultation which revealed an oval circumscribed mass in R breast @9N4, measuring 4 x 1 x 3.3 cm, likely due to developing scar/fat necrosis --> BIRADS 3. \par \par She will be scheduled for a R breast US 11/2/2022. \par She will also be due for a L dx mammogram and US on 11/2022 \par She will also be due for a breast MRI on 11/2022. \par She can follow up after all of her imaging is completed. \par \par In the future, she should be screened with breast MRIs as her breast cancer was mammographically occult. \par \par AS REVIEW: \par Her final pathology revealed a 5.4 cm well differentiated IDC, non-extensive DCIS that was low to intermediate grade, although there was numerous foci of LVI, negative surgical margins and 1 SLN with 5.5 mm of metastatic disease, 1/2 non-sentinel axillary lymph nodes with micrometastatic disease, and benign breast tissue in the left breast. \par \par We discussed her final pathology both at the time of her surgery and today.  Ordinarily, she would be recommended for a completion axillary lymph node dissection given that she had 1 positive SLN and 1/2 lymph nodes with micrometastatic disease.  Based off the AMAROS trial, the ten year follow up for these patients revealed that axillary radiation was not inferior to a completion axillary lymph node dissection.  The local regional recurrence for patients who underwent axillary radiation was 1.82% and for patients who underwent an axillary lymph node dissection, was 0.93%.  There was no statistical difference in overall survival or distant metastases free survival.  This was discussed with Imtiaz and her daughter, who agreed to omit the completion axillary lymph node dissection and proceed with RIGHT post mastectomy radiation therapy. \par \par However, given the locally advanced nature of her breast cancer, she will be referred to medical oncology for further evaluation of adjuvant treatments and she will be referred to radiation oncology for evaluation of R PMRT. \par \par She can follow up with me in three months. \par In the future, her left breast should be screened with both mammograms and MRIs as her tumor was initially mammographically occult. \par  \par AS REVIEW:\par Her most recent imaging was a breast MRI on 12/17/2021 which revealed in her right breast, extensive nonmass enhancement in R superior breast with areas of clumped irregular enhancement, spans 5.4 x 5.3 x 7.8 cm --> medial and lateral aspects were biopsied and found to be invasive mammary carcinoma.  No lymphadenopathy was appreciated in the right side. \par \par We have discussed her new diagnosis of breast cancer.  She is currently a stage IIB breast cancer, based off AJCC 8th edition.  We discussed her surgical and other treatment options at this time. \par \par In regards to her RIGHT sided breast cancer, she is NOT a great candidate for breast conservation surgery as her area of disease spans 7.8 cm.  Her lesion is located in the superior breast  based off the MRI so she is a candidate for a nipple sparing mastectomy.  However, there is no difference in survival between a lumpectomy + radiation therapy and a mastectomy.  However the rate of local recurrence is slightly higher with the lumpectomy. The rate of recurrence with a mastectomy is not zero, however, and is likely closer to 4-9%.  \par \par Regardless of if she chooses a mastectomy or a lumpectomy, she would require an evaluation of her axillary lymph nodes via a sentinel lymph node biopsy.  This is done by injecting the perioareolar breast tissue with dye prior to the surgery and removing 1-5 lymph nodes that are the first to drain the breast.  \par \par At this time, I have recommended proceeding with a R mastectomy and SLN Bx given the extent of disease. However if she was very motivated to undergo breast conservation surgery, we could consider performing a wide local excision with bracketted RF tags x 2 followed by an oncoplastic reduction, although we did discuss that if her margins were positive we would need to proceed with a mastectomy at that time.  In addition, given that her breast cancer was mammographically and sonographically occult, it would be more difficult to assess for recurrences in the future.  \par \par We did briefly discuss the different radiation options.  If she got a mastectomy, she would likely only need radiation therapy if she had a lot of positive margins or positive lymph nodes. If she undergoes a lumpectomy, she is a candidate for whole breast irradiation.  \par \par In regards to systemic therapy, we briefly discussed both chemotherapy and endocrine therapy. If the tumor is larger than 1 cm and her 2 negative, we would likely need to send an additional study on her tumor sample, called an oncotype DX to make the determination regarding if chemotherapy would help her.  At the completion of all these treatments, she should get endocrine therapy, at current time she would need an AI, for a total of at least 5 years.  \par \par If her HER2 was positive, I have also given her the option of neoadjuvant chemotherapy with Her 2 directed therapy as a means to shrink her tumor and possible avoid having to perform a mastectomy.  She is interested in this option if she is Her 2 positive.  We will call her back when the Her 2 is finalized. \par \par Per ASBrS consensus guidelines, any patient with a diagnosis of breast cancer should be offered panel genetic testing as 10% of these patients were found to have a mutation.  THis was offered to her and she would like to proceed with panel genetic testing.  She will let us know if she would like to proceed with testing. \par \par In regards to reconstruction, if she decides to proceed with a mastectomy,  she has the option for undergoing immediate breast reconstruction.  For this reason, I will have her see plastic surgery for further discussion of her reconstruction options.  \par  \par AS REVIEW: \par Her most recent imaging on 07/23/21 which revealed, postsurgical architectural distortion in the right breast, and on R breast US, @12:00 N5, she has an oval circumscribed mass measuring 0.4 x 0.4 x 0.3 cm favored to be simple cyst, deemed BI-RADS Category 2: Benign. \par \par Her final pathology revealed a papilloma with atypical ductal hyperplasia, but no evidence of cancer.  No further surgical intervention is indicated at this time.  \par \par AS REVIEW: \par Her most recent imaging was a R dx mammogram and US on 9/18/2020 which revealed a change in her previously visualized calcifications in her retroareolar area, which was her biopsy proven intraductal papilloma.  Her previously visualized mass @9N3 is no longer seen. \par \par Intraductal papillomas without atypia are considered fibroproliferative lesions without atypia.  Patients with these lesions were found to have a slightly increased relative risk of breast cancer compared to the reference population.  However the lesions themselves do not have any malignant potential. \par \par We discussed dense breasts.  Increasing breast density has been found to increase ones risk of breast cancer, but at this time, there is no clear indication for additional imaging in this setting, as both US and MRI have not been found to improve survival.  One can consider bilateral screening US.  However, out of 1000 women screened, the use of routine US will only identify an additional 3-5 cancers.  The use of US was found to increase the likelihood of undergoing more imaging and more biopsies.  She does have dense breasts.  We have decided to proceed with screening bilateral breast US at this time.  This will be scheduled with her next screening mammogram.\par \par We discussed atypical ductal hyperplasia.  These are considered benign high risk lesions.  Additionally, the presence of ADH has been associated with an increased lifetime risk of breast cancer by about 4 fold.\par \par In light of her new diagnosis of atypical ductal hyperplasia, her risk assessment is as follows: \par RISK ASSESSMENT: \par Tanja\par 5yr -- 3.3%\par lifetime -- 16.2%\par \par TC v6 \par 5yr -- 5.3%\par lifetime -- 20.6%\par \par This puts her in the high risk category for breast cancer because she has a lifetime risk of >20%.  She qualifies for annual screening MRIs which would be done in an alternating fashion with her screening mammograms such that an imaging study and clinical breast exam would be performed every 6 months.  The use of MRIs have not been shown to prolong survival, however out of 1000 women screened, an additional 14-15 cancers will be identified.  The use of MRIs, has, however, been shown to increase the number of procedures and additional imaging because although it is a very sensitive test, it is not as specific.  This was discussed with the patient and she would like to think about proceeding with screening MRIs.  \par \par All of her questions were answered.  She knows to call with any further questions or concerns. \par \par \par PLAN: \par -SOZO\par -INVITAE panel genetic testing -- still deciding whether or not to proceed \par -R breast US and L dx mammogram and US will be due in 11/2022 \par -breast MRI due on 11/2022\par -f/up after

## 2022-08-05 NOTE — HISTORY OF PRESENT ILLNESS
[FreeTextEntry1] : Imtiaz is a 62 F with a Right breast intraductal papilloma, s/p R WLE on 2021, now with a MR detected RIGHT breast invasive mammary carcinoma, [id number], ER/FL pos, her 2 neg, anatomic stage IIIA, prognostic stage IB, s/p R mastectomy, SLN Bx, with immediate reconstruction (goldilocks) with direct to silicone implant and concurrent L breast reduction/mastopexy for symmetry on 2022, AJCC 8th edition. \par \par 2022 -- R mastectomy, SLN Bx, with immediate reconstruction (goldilocks) with direct to silicone implant and concurrent L breast reduction/mastopexy for symmetry\par 1. 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 R SLN Bx \par -/ SLN positive for mets, measuring 5.5 mm, metastatic tumor cell present within the lymphovascular spaces of the extracapsular soft tissue \par -1/2 non sentinel nodes positive for micrometastatic carcinoma, no CAROLINE \par 3. R additional tissue and skin \par -benign \par 4. 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 \par \par 2021 -- R WLE \par -sclerosing papilloma \par -atypical ductal hyperplasia \par \par Her work up was as follows: \par 2020 -- b/l screening mammogram \par -heterogenously dense breasts\par -calcifications in retroareolar R breast \par -no suspicious mass, microcalcifications or architectural distortion in L breast \par BIRADS 0 \par \par 2020 -- R dx mammogram \par -heterogenously dense breasts\par -oval circumscribed mass with calcifications in retroareolar R breast, measures 0.4 cm \par R US \par -@9N3, round circumscribed mass, measuring 3 x 2 x 2 mm\par BIRADS 4 \par \par 3/6/2020 -- repeat R dx mammogram and US \par -@9N3, fluctuating cyst, slightly decreased in size measuring 2 x 2 x 2 mm \par BIRADS 3 \par \par 2020 -- R dx mammogram and US \par -heterogenously dense breasts\par -change in calcifications in retroareolar R breast --> BIOPSY \par R US \par -@9N3, previously visualized mass is no longer seen \par BIRADS 4\par \par 10/1/2020 -- R stereotactic guided biopsy (tophat)\par -intraductal papilloma \par \par She has no breast related complaints at this time.  She denies any breast pain, has not palpated any new palpable masses in either breast and denies any nipple discharge or retraction.  \par \par HISTORICAL RISK FACTORS: \par -no prior breast biopsies or surgeries \par -no family history of breast or ovarian cancer \par -, age at first live birth was 28 \par -prior OCP use \par -no gyn surgeries\par \par INTERVAL HISTORY: \par Imtiaz returns for her follow up visit, s/p R WLE on 2021. \par \par Her final pathology revealed an atypical papilloma with atypical ductal hyperplasia.  \par Her risk assessment is as follows: \par RISK ASSESSMENT: \par Tanja\par 5yr -- 3.3%\par lifetime -- 16.2%\par \par TC v6 \par 5yr -- 5.3%\par lifetime -- 20.6%\par \par INTERVAL HISTORY:\par 2021 \par Imtiaz returns for a 6 month follow up for benign intermediate to high risk disease 2/2 R intraductal papilloma and atypical ductal hyperplasia. \par \par She has no breast related complaints at this time.  She denies any breast pain, has not palpated any new palpable masses in either breast and denies any nipple discharge or retraction.  \par \par Her most recent imaging on 21 which revealed, postsurgical architectural distortion in the right breast, and on R breast US, @12:00 N5, she has an oval circumscribed mass measuring 0.4 x 0.4 x 0.3 cm favored to be simple cyst, deemed BI-RADS Category 2: Benign. \par \par INTERVAL HISTORY: \par 3/2/2022\par Imtiaz returns for a short term follow up, now with a new diagnosis of a MR detected RIGHT breast invasive mammary carcinoma, [id number], ER/FL pos, her 2 neg, anatomic stage IIB, AJCC 8th edition. \par \par She called the office with two episodes of RIGHT bloody nipple discharge.  The first time was at the end of August, no testing was performed at this time as she had normal imaging one month prior.  She had a second episode of right bloody nipple discharge, but a much larger amount, and at this time she was sent for the following diagnostic imaging.  \par \par She has not had any further episodes of bloody nipple discharge and has not palpated any abnormal masses within either breast and otherwise healed well from her surgery. \par \par 2021 -- R dx mammogram and US \par -heterogenously dense breasts\par -architectural distortion in the RA region from recent surgery.  \par -scattered coarse benign appearing calcifications \par -no suspicious mass, calcs, or areas of architectural distortion \par R US \par -no solid or cystic lesion seen \par BIRADS 2 -- rec BREAST MRI \par \par 2021 -- MR breast \par RIGHT: \par -extensive nonmass enhancement in R superior breast with areas of clumped irregular enhancement, spans 5.4 x 5.3 x 7.8 cm --> BIOPSY medial and lateral aspects \par -no lymphadenopathy \par LEFT: \par -scattered foci of nonspecific enhancement\par -enlarged L axillary LN consistent with recent vaccine history \par BIRADS 4\par \par 2022 -- R MR guided biopsy x 2 \par 1. R MR Bx, lateral aspect of enhancement, hourglass clip \par -invasive mammary carcinoma with both tubular and lobular features \par -DCIS, intermediate nuclear grade \par -focal ALH \par -ER/FL pos (Michael ), Her 2 PENDING\par -Ki 67: 3-5%\par \par 2. R MR Bx, medial aspect of enhancement, minicork clip was placed but was extruded after the biopsy \par -invasive mammary carcinoma with both tubular and lobular features \par -DCIS, intermediate nuclear grade \par -focal ALH \par -ER/FL pos (Michael ), Her 2 PENDING\par -Ki 67: 7-10%\par \par 2022 -- L screening mammogram \par -heterogenously dense breasts\par -stable calcifications in L lateral breast, unchanged from 2018, c/w benign etiology \par -stable nodular asymmetries in L, unchanged from 2018 \par -no suspicious mass, calcs, architectural distortion in L \par BIRADS 2 \par \par INTERVAL HISTORY: \par 2022 \par Imtiaz is a 62 F with R breast cancer, s/p R mastectomy, SLN Bx, with immediate reconstruction (goldilocks) with direct to silicone implant and concurrent L breast reduction/mastopexy for symmetry on 2022, here for her FIRST POST OP VISIT. \par \par She is feeling well.  Her pain is well controlled and she is healing well from her recent surgery.  She still has 1 ARNAV drain in place which is being managed by Dr. Brambila (plastic surgery). \par \par She denies any fevers or chills and her pain is well controlled. \par \par Her final pathology revealed a 5.4 cm well differentiated IDC, nonextensive DCIS that was low to intermediate grade, although there was numerous foci of LVI, negative surgical margins and 1 SLN with 5.5 mm of metastatic disease, 1/2 non-sentinel axillary lymph nodes with micrometastatic disease, and benign breast tissue in the left breast. \par \par INTERVAL HISTORY: \par 2022 \par Imtiaz is a 62 F with R breast cancer, s/p R mastectomy SLN Bx with immediate  reconstruction (goldilocks) with direct to silicone implant and concurrent L breast reduction/mastopexy for symmetry on 2022. \par \par She is here for a 3 month follow up visit. \par Since her last visit, she had an oncotype score of 20 and was recommended for letrozole. \par She is currently on her 7th treatment of R PMRT. \par \par For the last month, she was feeling a nodule in her right inferior/lateral breast.  She was seen by Dr. Brambila from plastic surgery who recommended breast massages as this appeared to be scar tissue.  This does not cause her pain and she denies any left sided breast complaints. \par \par A targeted R breast US was performed immediately following the consultation which revealed an oval circumscribed mass in R breast @9N4, measuring 4 x 1 x 3.3 cm, likely due to developing scar/fat necrosis --> BIRADS 3. \par

## 2022-08-05 NOTE — DATA REVIEWED
[FreeTextEntry1] : ACC: [id number]     EXAM:  MG US BREAST LIMITED RT\par \par PROCEDURE DATE:  08/02/2022\par \par \par \par INTERPRETATION:  CLINICAL HISTORY: Right breast cancer status post mastectomy in April, 2022. Induration/palpable lump along the right breast mastectomy site.\par \par The patient reports her last clinical breast examination was performed within the past year.\par \par COMPARISON: None.\par \par FAMILY HISTORY: None.\par \par TECHNIQUE: Targeted right breast ultrasound was performed.\par \par \par FINDINGS:\par \par Oval, circumscribed, isoechoic mass in the right breast, 9:00 axis, 4 cm from the midline measures approximately 4.0 x 1.0 x 3.3 cm. This is likely due to developing scar/fat necrosis. Short interval sonographic follow-up is recommended in 3 months.\par \par \par IMPRESSION:\par \par Probable developing scar/fat necrosis along the right breast mastectomy site. Sonographic follow-up is recommended in 3 months.\par \par \par Recommendation: Follow-up breast ultrasound in 3 months.\par \par BI-RADS category 3: Probably Benign\par \par \par Findings and recommendations were discussed with the patient.\par \par --- End of Report ---\par \par \par \par \par \par DANIEL GARG MD; Attending Radiologist\par This document has been electronically signed. Aug  2 2022  2:20PM\par

## 2022-08-05 NOTE — REVIEW OF SYSTEMS
[As Noted in HPI] : as noted in HPI [Negative] : Heme/Lymph [Fever] : no fever [Chills] : no chills [Abn Vaginal Bleeding] : no unexplained vaginal bleeding [Skin Lesions] : no skin lesions [Skin Wound] : no skin wound [Breast Pain] : no breast pain [Breast Lump] : breast lump [Hot Flashes] : no hot flashes

## 2022-08-05 NOTE — REASON FOR VISIT
[FreeTextEntry1] : hx of right intraductal papilloma, s/p R WLE on 1/22/2021, now with R breast invasive mammary carcinoma, [id number], ER/W[place] pos, Her 2 neg, anatomic stage IIIA, prognostic stage IB, s/p R mastectomy, SLN Bx, with immediate reconstruction (goldilocks) with direct to silicone implant and concurrent L breast reduction/mastopexy for symmetry on 4/13/2022

## 2022-08-05 NOTE — PHYSICAL EXAM
[Normocephalic] : normocephalic [Atraumatic] : atraumatic [No Supraclavicular Adenopathy] : no supraclavicular adenopathy [No Cervical Adenopathy] : no cervical adenopathy [No dominant masses] : no dominant masses left breast [No Nipple Retraction] : no left nipple retraction [No Nipple Discharge] : no left nipple discharge [No Axillary Lymphadenopathy] : no left axillary lymphadenopathy [de-identified] : @8N3-5, she has a 3 x 3 cm area of induration without any overlying skin changes, erythema or ulceration; otherwise her surgical sites are well healed, she has minimal post radiation changes  [de-identified] : no suspicious abnormalities palpated

## 2022-08-08 ENCOUNTER — NON-APPOINTMENT (OUTPATIENT)
Age: 62
End: 2022-08-08

## 2022-08-08 VITALS
WEIGHT: 167.38 LBS | OXYGEN SATURATION: 95 % | HEART RATE: 91 BPM | RESPIRATION RATE: 12 BRPM | DIASTOLIC BLOOD PRESSURE: 73 MMHG | BODY MASS INDEX: 32.69 KG/M2 | SYSTOLIC BLOOD PRESSURE: 126 MMHG | TEMPERATURE: 96.4 F

## 2022-08-08 PROCEDURE — 77387C: CUSTOM

## 2022-08-08 NOTE — HISTORY OF PRESENT ILLNESS
[FreeTextEntry1] : Patient reports doing well.  She is applying Aquaphor & Cortizone for pruritus, which is helping.  Otherwise, no new concerns.

## 2022-08-08 NOTE — DISEASE MANAGEMENT
[Pathological] : TNM Stage: p [IB] : IB [FreeTextEntry4] : invasive ductal carcinoma of the right breast, G1, ER/MT positive / HER 2 negative / upper outer and upper inner quadrant [TTNM] : 3 [NTNM] : 1a [MTNM] : 0 [de-identified] : 2800cGy [de-identified] : 5000cGy [de-identified] : right breast

## 2022-08-08 NOTE — PHYSICAL EXAM
[Sclera] : the sclera and conjunctiva were normal [Hearing Threshold Finger Rub Not [place]] : hearing was normal [] : no respiratory distress [Respiration, Rhythm And Depth] : normal respiratory rhythm and effort [Exaggerated Use Of Accessory Muscles For Inspiration] : no accessory muscle use [Heart Rate And Rhythm] : heart rate and rhythm were normal [___] : a [unfilled] ~Ucm mastectomy scar [Breast Reconstruction Right] : breast reconstruction [Nail Clubbing] : no clubbing  or cyanosis of the fingernails [Motor Tone] : muscle strength and tone were normal [No Focal Deficits] : no focal deficits [Motor Exam] : the motor exam was normal [Normal] : oriented to person, place and time, the affect was normal, the mood was normal and not anxious [Enlargement Of The Right Breast] : no swelling [Tenderness Of The Right Breast] : no tenderness [de-identified] : no desquamation

## 2022-08-09 ENCOUNTER — NON-APPOINTMENT (OUTPATIENT)
Age: 62
End: 2022-08-09

## 2022-08-09 PROCEDURE — 77427 RADIATION TX MANAGEMENT X5: CPT

## 2022-08-10 PROCEDURE — 77387C: CUSTOM

## 2022-08-11 PROCEDURE — 77387C: CUSTOM

## 2022-08-12 ENCOUNTER — NON-APPOINTMENT (OUTPATIENT)
Age: 62
End: 2022-08-12

## 2022-08-12 PROCEDURE — 77387C: CUSTOM

## 2022-08-16 ENCOUNTER — NON-APPOINTMENT (OUTPATIENT)
Age: 62
End: 2022-08-16

## 2022-08-16 DIAGNOSIS — U07.1 COVID-19: ICD-10-CM

## 2022-08-16 PROCEDURE — 77387C: CUSTOM

## 2022-08-16 NOTE — REVIEW OF SYSTEMS
[Fatigue: Grade 1 - Fatigue relieved by rest] : Fatigue: Grade 1 - Fatigue relieved by rest [Breast Pain: Grade 0] : Breast Pain: Grade 0 [Pruritus: Grade 0] : Pruritus: Grade 0

## 2022-08-17 PROCEDURE — 77387C: CUSTOM

## 2022-08-17 PROCEDURE — 77427 RADIATION TX MANAGEMENT X5: CPT

## 2022-08-18 PROCEDURE — 77387C: CUSTOM

## 2022-08-19 PROCEDURE — 77387C: CUSTOM

## 2022-08-22 ENCOUNTER — NON-APPOINTMENT (OUTPATIENT)
Age: 62
End: 2022-08-22

## 2022-08-22 VITALS
HEART RATE: 91 BPM | DIASTOLIC BLOOD PRESSURE: 79 MMHG | OXYGEN SATURATION: 95 % | RESPIRATION RATE: 12 BRPM | SYSTOLIC BLOOD PRESSURE: 136 MMHG

## 2022-08-22 DIAGNOSIS — L29.9 PRURITUS, UNSPECIFIED: ICD-10-CM

## 2022-08-22 PROCEDURE — 77387C: CUSTOM

## 2022-08-22 NOTE — PHYSICAL EXAM
[Sclera] : the sclera and conjunctiva were normal [Hearing Threshold Finger Rub Not [place]] : hearing was normal [] : no respiratory distress [Respiration, Rhythm And Depth] : normal respiratory rhythm and effort [Exaggerated Use Of Accessory Muscles For Inspiration] : no accessory muscle use [Motor Exam] : the motor exam was normal [Normal] : oriented to person, place and time, the affect was normal, the mood was normal and not anxious [___] : a [unfilled] ~Ucm mastectomy scar [Breast Reconstruction Right] : breast reconstruction [de-identified] : Brisk erythema but no desquamation

## 2022-08-22 NOTE — REVIEW OF SYSTEMS
[Fatigue: Grade 1 - Fatigue relieved by rest] : Fatigue: Grade 1 - Fatigue relieved by rest [Breast Pain: Grade 1 - Mild pain] : Breast Pain: Grade 1 - Mild pain [Cough: Grade 1 - Mild symptoms; nonprescription intervention indicated] : Cough: Grade 1 - Mild symptoms; nonprescription intervention indicated [Dyspnea: Grade 0] : Dyspnea: Grade 0 [Hypoxia: Grade 0] : Hypoxia: Grade 0 [Pruritus: Grade 1 - Mild or localized; topical intervention indicated] : Pruritus: Grade 1 - Mild or localized; topical intervention indicated [Dermatitis Radiation: Grade 2 - Moderate to brisk erythema; patchy moist desquamation, mostly confined to skin folds and creases; moderate edema] : Dermatitis Radiation: Grade 2 - Moderate to brisk erythema; patchy moist desquamation, mostly confined to skin folds and creases; moderate edema [FreeTextEntry7] : 1/10 - discomfort

## 2022-08-22 NOTE — DISEASE MANAGEMENT
[Pathological] : TNM Stage: p [IB] : IB [FreeTextEntry4] : invasive ductal carcinoma of the right breast, G1, ER/LA positive / HER 2 negative / upper outer and upper inner quadrant [TTNM] : 3 [NTNM] : 1a [MTNM] : 0 [de-identified] : 8350cGy [de-identified] : 5000cGy [de-identified] : right recon breast/Sclav

## 2022-08-22 NOTE — DISEASE MANAGEMENT
[Pathological] : TNM Stage: p [IB] : IB [FreeTextEntry4] : invasive ductal carcinoma of the right breast, G1, ER/[place] positive / HER 2 negative / upper outer and upper inner quadrant [TTNM] : 3 [NTNM] : 1a [MTNM] : 0 [de-identified] : 3600cGy [de-identified] : 5000cGy [de-identified] : right breast

## 2022-08-22 NOTE — PHYSICAL EXAM
[Sclera] : the sclera and conjunctiva were normal [Hearing Threshold Finger Rub Not [place]] : hearing was normal [] : no respiratory distress [Respiration, Rhythm And Depth] : normal respiratory rhythm and effort [Exaggerated Use Of Accessory Muscles For Inspiration] : no accessory muscle use [Auscultation Breath Sounds / Voice Sounds] : lungs were clear to auscultation bilaterally [Heart Rate And Rhythm] : heart rate and rhythm were normal [Enlargement Of The Right Breast] : no swelling [Tenderness Of The Right Breast] : no tenderness [___] : a [unfilled] ~Ucm mastectomy scar [Breast Reconstruction Right] : breast reconstruction [Nail Clubbing] : no clubbing  or cyanosis of the fingernails [Motor Tone] : muscle strength and tone were normal [No Focal Deficits] : no focal deficits [Motor Exam] : the motor exam was normal [Normal] : oriented to person, place and time, the affect was normal, the mood was normal and not anxious [de-identified] : no desquamation

## 2022-08-22 NOTE — HISTORY OF PRESENT ILLNESS
[FreeTextEntry1] : Patient reports feeling better.  She denies fever, chills, sore throat and fatigue.  It's been over 10 days since testing positive for COVID.  She may seldom cough but non-productive.  Otherwise, she has mod erythema to the treated site, with mild pruritus, which feels better with Cortizone & Aquaphor.  No new concerns.   She will be completing XRT this week.  We discussed d/c instructions and f/u appointments.

## 2022-08-22 NOTE — HISTORY OF PRESENT ILLNESS
[FreeTextEntry1] : Patient tested + for COVID over the weekend, symptomatic with cough, sore throat and fatigue, missed XRT on Monday.  She is here today and feeling better.  We discussed COVID safety precautions.  Patient has no new concerns.    She does mention on 8/2/2022, having a target  US of the right breast, ordered by Dr. Rodriguez, in which  shows, probable developing scar/fat necrosis along the right breast mastectomy site. Sonographic follow-up is recommended in 3 months. Recommendation: Follow-up breast ultrasound in 3 months. BI-RADS category 3: Probably Benign\par \par

## 2022-08-23 PROCEDURE — 77387C: CUSTOM

## 2022-08-24 PROCEDURE — 77427 RADIATION TX MANAGEMENT X5: CPT

## 2022-08-24 PROCEDURE — 77387C: CUSTOM

## 2022-08-30 ENCOUNTER — NON-APPOINTMENT (OUTPATIENT)
Age: 62
End: 2022-08-30

## 2022-08-31 ENCOUNTER — LABORATORY RESULT (OUTPATIENT)
Age: 62
End: 2022-08-31

## 2022-08-31 ENCOUNTER — APPOINTMENT (OUTPATIENT)
Dept: HEMATOLOGY ONCOLOGY | Facility: CLINIC | Age: 62
End: 2022-08-31

## 2022-08-31 ENCOUNTER — TRANSCRIPTION ENCOUNTER (OUTPATIENT)
Age: 62
End: 2022-08-31

## 2022-08-31 ENCOUNTER — OUTPATIENT (OUTPATIENT)
Dept: OUTPATIENT SERVICES | Facility: HOSPITAL | Age: 62
LOS: 1 days | Discharge: HOME | End: 2022-08-31

## 2022-08-31 VITALS
BODY MASS INDEX: 32 KG/M2 | DIASTOLIC BLOOD PRESSURE: 71 MMHG | WEIGHT: 163 LBS | TEMPERATURE: 97.6 F | HEIGHT: 60 IN | HEART RATE: 96 BPM | RESPIRATION RATE: 14 BRPM | SYSTOLIC BLOOD PRESSURE: 126 MMHG

## 2022-08-31 DIAGNOSIS — Z98.891 HISTORY OF UTERINE SCAR FROM PREVIOUS SURGERY: Chronic | ICD-10-CM

## 2022-08-31 DIAGNOSIS — Z90.49 ACQUIRED ABSENCE OF OTHER SPECIFIED PARTS OF DIGESTIVE TRACT: Chronic | ICD-10-CM

## 2022-08-31 DIAGNOSIS — Z90.89 ACQUIRED ABSENCE OF OTHER ORGANS: Chronic | ICD-10-CM

## 2022-08-31 DIAGNOSIS — Z98.890 OTHER SPECIFIED POSTPROCEDURAL STATES: Chronic | ICD-10-CM

## 2022-08-31 DIAGNOSIS — D36.9 BENIGN NEOPLASM, UNSPECIFIED SITE: Chronic | ICD-10-CM

## 2022-08-31 DIAGNOSIS — C50.811 MALIGNANT NEOPLASM OF OVERLAPPING SITES OF RIGHT FEMALE BREAST: ICD-10-CM

## 2022-08-31 PROCEDURE — 99214 OFFICE O/P EST MOD 30 MIN: CPT

## 2022-08-31 NOTE — CONSULT LETTER
[Dear  ___] : Dear  [unfilled], [Please see my note below.] : Please see my note below. [Consult Closing:] : Thank you very much for allowing me to participate in the care of this patient.  If you have any questions, please do not hesitate to contact me. [Sincerely,] : Sincerely, [DrCarol  ___] : Dr. TYLER [Courtesy Letter:] : I had the pleasure of seeing your patient, [unfilled], in my office today. [FreeTextEntry3] : Alma Martinez MD [DrCarol ___] : Dr. TYLER

## 2022-08-31 NOTE — PHYSICAL EXAM
[Fully active, able to carry on all pre-disease performance without restriction] : Status 0 - Fully active, able to carry on all pre-disease performance without restriction [Normal] : affect appropriate [de-identified] : S/P right breast mastectomy, SLNB and left breast reduction. S/P PMRT to right chest wall with skin reaction and erythema.

## 2022-08-31 NOTE — HISTORY OF PRESENT ILLNESS
[de-identified] : 63 yo female was referred by Dr. Rodriguez for consultation of breast cancer. She has h/o right breast intraductal papilloma, s/p R WLE on 1/22/2021. MRI breast on 12/17/21 showed large irregular nonmass enhancement in the right superior breast.  No right axillary adenopathy. MRI guided biopsy of 2 areas was recommended. Scattered foci of nonspecific enhancement in the left breast. Enlarged left axillary lymph nodes consistent with recent vaccine history. Low-lying left axillary lymph node also noted.\par \par \par On 2/25/22, She had MRI guided biopsies x 2:\par 1. Breast, right lateral irregular non-mass like enhancement, MRI guided needle core biopsies:\par - Invasive well differentiated mammary carcinoma with both tubular and lobular features (tubulo-lobular carcinoma), ER positive, 100%, NV positive 85%, Ki 67 3-5%, Her-2 negative.\par - Ductal carcinoma in-situ (DCIS), micropapillary and solid types with comedo necrosis and microcalcifications, intermediate nuclear grade.\par - Focal atypical lobular hyperplasia (ALH) and proliferative type fibrocystic changes associated with microcalcifications.\par \par 2. Breast, right medial irregular non-mass like enhancement, MRI guided needle core biopsies:\par - Invasive well differentiated mammary carcinoma with both tubular and lobular features (tubulo-lobular carcinoma) associated, ER pos 100%, NV pos 90%, Her-2 negative, Ki 67 7-10%.\par - Ductal carcinoma in-situ (DCIS), micropapillary and solid types with comedo necrosis and microcalcifications, intermediate nuclear grade; extending to involve a sclerosing intraductal papilloma ("atypical papilloma").\par - Focal atypical lobular hyperplasia (ALH) and proliferative type fibrocystic changes associated with microcalcifications.\par \par On 4/13/22, she underwent right breast skin sparing simple mastectomy, SLNB and left breast reduction. The pathology revealed 5.4 cm invasive well differentiated ductal, non-extensive ductal carcinoma in situ (DCIS), low to intermediate nuclear grade. Numerous foci of lymphovascular invasion are present. One SLN showed metastatic carcinoma with the largest contiguous focus measuring 5.5 mm. Metastatic tumor cells are present within lymphovascular spaces of the extracapsular soft tissue. One of two right non-sentinel axillary lymph nodes showed micrometastatic carcinoma measuring 0.26 mm. AJCC Eighth Edition Pathologic Stage: pT3, pN1a, pMx.\par Oncotype RS is 20. \par \par She is recovering from surgery and there is small nonhealing area at incision site. She has been followed by Dr. Brambila. She is here with her daughter to discuss systemic adjuvant therapy.  [de-identified] : 08/31/2022\julissa Kitchen is here for follow up visit. She has stage IIIA ([id number]) IDC of the right breast, G1, ER/MO positive, Her-2 negative, s/p simple right mastectomy, SLNB, implant reconstruction and left breast reduction on 4/13/22. Oncotype RS is 20.  8/2/2022, having a target US of the right breast, ordered by Dr. Rodriguez, in which shows, probable developing scar/fat necrosis along the right breast mastectomy site. Sonographic follow-up is recommended in 3 months. She just completed PMRT to right chest and feels skin irritation. She has been taking Letrozole since 5/2022 and tolerated well. She noted hair thinning.\par

## 2022-08-31 NOTE — ASSESSMENT
[FreeTextEntry1] : 63 yo female has stage IIIA ([id number]) IDC of the right breast, G1, ER/MO positive, Her-2 negative, s/p simple right mastectomy, SLNB, implant reconstruction and left breast reduction.\par Oncotype RS is 20.\par \par Assessment and Plan:\par -- S/P PMRT with skin reaction. Followup with rad onc. \par -- Continue Letrozole daily. Discussed adjuvant systemic therapy previously. Imtiaz has hormone receptor positive and Her-2 negative breast cancer with 5.4 cm tumor, G1 and two positive axillary lymph nodes. Her Oncotype RS is 20, predicting low likelihood benefit from adjuvant chemotherapy. The update from the RxPONDER trial results show that postmenopausal female with pT1-3, HR positive, Her-2 negative breast cancer with RS <26 derived no benefit from the addition of chemotherapy to endocrine therapy. As per NCCN guideline, she may forgo chemotherapy and have adjuvant endocrine therapy alone. \par -- We discussed bone health management since Letrozole may reduce bone density. Bone density from 6/11/22 was within normal limit. Continue calcium and vitamin D supplement. Encourage health life style and regular physical activities. \par -- Will order blood work today: CBC, BMP, LFT, HbA1c, TSH, Free T4. \par -- Followup with breast surgeon and plastic surgeon Dr. Brambila \par -- RTO for f/u in 6 months.

## 2022-09-01 DIAGNOSIS — Z51.81 ENCOUNTER FOR THERAPEUTIC DRUG LEVEL MONITORING: ICD-10-CM

## 2022-09-08 ENCOUNTER — NON-APPOINTMENT (OUTPATIENT)
Age: 62
End: 2022-09-08

## 2022-09-13 ENCOUNTER — NON-APPOINTMENT (OUTPATIENT)
Age: 62
End: 2022-09-13

## 2022-09-15 ENCOUNTER — NON-APPOINTMENT (OUTPATIENT)
Age: 62
End: 2022-09-15

## 2022-09-19 ENCOUNTER — APPOINTMENT (OUTPATIENT)
Dept: PLASTIC SURGERY | Facility: CLINIC | Age: 62
End: 2022-09-19

## 2022-09-19 PROCEDURE — 99212 OFFICE O/P EST SF 10 MIN: CPT

## 2022-09-19 NOTE — ASSESSMENT
[FreeTextEntry1] : 61 y/o F with h/o right breast intraductal papilloma s/p R WLE on 1/22/21, now with recent MR detected Stage IIB RIGHT breast invasive mammary carcinoma ([id number], ER+/NV+/Her2+, 7.8 cm superior pole), recommended to undergo skin-sparing mastectomy, which patient has confirmed is her desired oncologic plan.\par \par 12 weeks s/p right breast immediate reconstruction ([facility]) with direct-to-silicone implant (605cc) and Alloderm via SSM approach and concurrent left breast conservative reduction/mastopexy for symmetry. \par Right breast incisional epidermolysis debrided and healing well with LWC. No signs of infection. No exposed implant. \par \par Completely healed right vertical scar\par \par Now s/p radiation, some fat necrosis and tightness on the R side\par left breast with focal fat necrosis at 7:00 periareolar location\par \par - Scar care to breast incisions\par - continue surgical bra or support bras\par - post-op instructions discussed and all questions answered\par - f/u 9 months for reconstruction check following radiation changes, possible 1st stage fat necrosis then staged nipple reconstruction\par \par Due to COVID-19, pre-visit patient instructions were explained to the patient and their symptoms were checked upon arrival. Masks were used by the healthcare provider and staff and the examination room was cleaned after the patient visit concluded

## 2022-09-19 NOTE — PHYSICAL EXAM
[de-identified] : well-appearing, NAD [de-identified] : nonlabored breathing [de-identified] : Left breast: soft, all incisions healing well, c/d/i, no erythema or fluid collection, minor superficial T-point wound now completely healed\par Right breast: implant soft, mastectomy flaps well perfused with good cap refill, vertical incision healedt; no fluid collection or cellulitis. Small palpable lump of fat necrosis laterally [de-identified] : FROM

## 2022-09-19 NOTE — HISTORY OF PRESENT ILLNESS
[FreeTextEntry1] : 61 yo  F /[facility] survivor with PMHx with NIDDM (on metformin), HLD, UC (on mesalamine), hypothyroidism and right breast intraductal papilloma s/p R WLE on 21, now with recent MR detected Stage IIB RIGHT breast invasive mammary carcinoma ([id number], ER+/WV+/Her2+, 7.8 cm superior pole).  Per Breast Surgeon (Dr. Irina Rodriguez), she is not a great candidate for BCS for 7.8 cm cancer and she was offered Right mastectomy. Pt desires mastectomy. She is now referred by her Breast Surgeon - Dr. Irina Rodriguez for discussion of reconstruction options.\par \par No prior breastfeeding history\par Current bra size: 42C\par Desired reconstructive volume: would like to be smaller and lifted\par C/o heavy breasts a/w shoulder pain and brassiere grooving\par \par Non-smoker\par No personal or family h/o DVT and MRSA \par Ht 5'0" Wt 168 lb BMI 32.8%\par Last Hb a1c 7.4% 2021\par Here today with her daughter, who she lives with\par Daughter is an RN\par \par Interval hx (22): Pt presents for follow up for review of FDA checklist and reconstructive volume goals. Would like to be smaller and lifted\par \par Interval hx (22). Patient presents today accompanied by her daughter POD#7 s/p right breast immediate reconstruction ([facility]) with direct-to-silicone implant (605cc) and Alloderm via SSM approach and concurrent left breast conservative reduction/mastopexy for symmetry. Doing well with improving incisional discomfort. Taking all prescribed medications. Denies any f/c or drainage. Drains functional. \par \par Interval hx (22): POD#16 s/p right breast immediate reconstruction ([facility]) with direct-to-silicone implant (605cc) and Alloderm via SSM approach and concurrent left breast conservative reduction/mastopexy for symmetry.   Reports taking tramadol x 2 yesterday for right upper breast. Denies fevers, chills, SOB, CP.  Completed abx on Wed. Right breast drain; 15/13/13/10.\par \par Interval hx (22): Pt presents today POD#23 s/p right breast immediate reconstruction ([facility]) with direct-to-silicone implant (605cc) and Alloderm via SSM approach and concurrent left breast conservative reduction/mastopexy for symmetry. Doing well. Denies any f/c or drainage. \par \par Interval hx (22): Pt presents today POD#29 s/p right breast immediate reconstruction ([facility]) with direct-to-silicone implant (605cc) and Alloderm via SSM approach and concurrent left breast conservative reduction/mastopexy for symmetry. Performing daily LWC. Denies any f/c or drainage. Taking Doxycycline as prescribed. \par \par Interval hx (22): 5 weeks s/p  right breast immediate reconstruction ([facility]) with direct-to-silicone implant (605cc) and Alloderm via SSM approach and concurrent left breast conservative reduction/mastopexy for symmetry.  performing LWC as instructed except applying saline to free gauze..  Denies fevers, chills, swelling.  Here for right breast T-point wound follow up.\par \par Interval hx (22): 7 weeks s/p  right breast immediate reconstruction ([facility]) with direct-to-silicone implant (605cc) and Alloderm via SSM approach and concurrent left breast conservative reduction/mastopexy for symmetry. Performing LWC with NS WTD to right breast wound.  Denies fevers, chills, swelling or purulent drainage. Pleased with healing. \par \par Interval hx (6/10/22): 8 weeks s/p  right breast immediate reconstruction ([facility]) with direct-to-silicone implant (605cc) and Alloderm via SSM approach and concurrent left breast conservative reduction/mastopexy for symmetry. Performing LWC with NS WTD to right breast wound.  Denies fevers, chills, swelling or purulent drainage. Feels the healing is slow for the right breast.  She to undergo 5 weeks of XRT.  No chemotherapy needed.\par \par Interval hx (22) 10 weeks  s/p  right breast immediate reconstruction ([facility]) with direct-to-silicone implant (605cc) and Alloderm via SSM approach and concurrent left breast conservative reduction/mastopexy for symmetry. Performing LWC with NS WTD to right breast wound.  Wearing surgical; daughter feels that it is aiding  wound contracture.  Wound more shallow and smaller. \par \par Interval hx (22):  12 weeks s/p right breast immediate reconstruction ([facility]) with direct-to-silicone implant (605cc) and Alloderm via SSM approach and concurrent left breast conservative reduction/mastopexy for symmetry. Vertical wound of right breast healed after LWC.  No longer drainage on dressing.  applying Aquaphor.   Here for clearance for XRT therapy\par \par Interval hx (22): 3 months s/p right breast immediate reconstruction ([facility]ocks) with direct-to-silicone implant (605cc) and Alloderm via SSM approach and concurrent left breast conservative reduction/mastopexy for symmetry. Continues to feel some area of fat necrosis on the R and some tightness there. Otherwise doing well and no issues.

## 2022-09-27 ENCOUNTER — APPOINTMENT (OUTPATIENT)
Dept: RADIATION ONCOLOGY | Facility: HOSPITAL | Age: 62
End: 2022-09-27

## 2022-09-27 VITALS
DIASTOLIC BLOOD PRESSURE: 74 MMHG | BODY MASS INDEX: 32.27 KG/M2 | RESPIRATION RATE: 12 BRPM | OXYGEN SATURATION: 94 % | HEART RATE: 109 BPM | TEMPERATURE: 96.8 F | SYSTOLIC BLOOD PRESSURE: 132 MMHG | WEIGHT: 165.25 LBS

## 2022-09-27 PROCEDURE — 99024 POSTOP FOLLOW-UP VISIT: CPT

## 2022-09-27 NOTE — REASON FOR VISIT
[Post-Treatment Evaluation] : post-treatment evaluation for [Breast Cancer] : breast cancer [Family Member] : family member

## 2022-09-27 NOTE — REVIEW OF SYSTEMS
[Cough] : cough [Joint Pain] : joint pain [Muscle Pain] : muscle pain [Hot Flashes] : hot flashes [Negative] : Psychiatric [Fever] : no fever [Chills] : no chills [Fatigue] : no fatigue [Dysphagia] : no dysphagia [Chest Pain] : no chest pain [Shortness Of Breath] : no shortness of breath [FreeTextEntry6] : slight cough at times [FreeTextEntry9] : chronic left knee pain  [de-identified] : mild

## 2022-09-27 NOTE — DISEASE MANAGEMENT
[Pathological] : TNM Stage: p [IB] : IB [FreeTextEntry4] : invasive ductal carcinoma of the right breast, G1, ER/OH positive / HER 2 negative / upper outer and upper inner quadrant [TTNM] : 3 [NTNM] : 1a [MTNM] : 0 [de-identified] : 5000cGy [de-identified] : 5000cGy [de-identified] : right breast

## 2022-09-27 NOTE — HISTORY OF PRESENT ILLNESS
[FreeTextEntry1] : KEVON TERRELL returns to clinic in follow up visit.  As you know, the patient is a 62 year old F with a diagnosis of invasive ductal carcinoma of the right breast, G1, ER/MA positive / HER 2 negative / upper outer and upper inner quadrant T3 N1a M0, stage IB. She is s/p right mastectomy and left breast reduction on 4/13/2022.  She declined chemotherapy.  She tolerated treatments with expected side effects of erythema and fatigue using 3D technique.  During her treatments, she was exposed to COVID 19, in which safety precautions were followed.  She had mild symptoms and recovered well.   The patient received 5000 cGy to the left chest wall and supraclavicular from 7/19/2022  through 8/24/2022 without complications.  I last saw her in August, 2022. In the interim, the patient reports doing well.  Her fatigue has improved, skin healed and continues to moisturize daily. She is tolerating the Letrozole fairly well, has mild arthritic pain, mild hot flashes and hair thinning.  Also mentioned, she continues to feel skin tightness and fat necrosis right reconstructed breast. Denies breast/chest wall  pain.  She is accompanied by her daughter. \par \par \par Upcoming appointments:\par Mariaelena 11/18/2022\julissa Martinez 2/23/2023\par Dr. Brambila 6/19/2023\par \par

## 2022-09-27 NOTE — PHYSICAL EXAM
[Sclera] : the sclera and conjunctiva were normal [Hearing Threshold Finger Rub Not Lamb] : hearing was normal [Heart Rate And Rhythm] : heart rate and rhythm were normal [Heart Sounds] : normal S1 and S2 [Murmurs] : no murmurs present [Edema] : no peripheral edema present [Lt > Rt] : the left breast was larger than the right [No Discharge] : no discharge [Breast Reconstruction Right] : breast reconstruction [Breast Reconstruction Left] : breast reconstruction [No Masses] : no breast masses were palpable [Abdomen Soft] : soft [Nondistended] : nondistended [Abdomen Tenderness] : non-tender [Cervical Lymph Nodes Enlarged Posterior Bilaterally] : posterior cervical [Cervical Lymph Nodes Enlarged Anterior Bilaterally] : anterior cervical [Supraclavicular Lymph Nodes Enlarged Bilaterally] : supraclavicular [Nail Clubbing] : no clubbing  or cyanosis of the fingernails [Motor Tone] : muscle strength and tone were normal [Skin Color & Pigmentation] : normal skin color and pigmentation [No Focal Deficits] : no focal deficits [Motor Exam] : the motor exam was normal [Normal] : oriented to person, place and time, the affect was normal, the mood was normal and not anxious [Enlargement Of The Right Breast] : no swelling [Tenderness Of The Right Breast] : no tenderness [___] :  no erythema [Enlargement Of The Left Breast] : no swelling [Tenderness Of The Left Breast] : no tenderness [Axillary Lymph Nodes Enlarged Bilaterally] : no enlarged nodes [de-identified] : mild residual hyperpigmentation

## 2022-10-18 ENCOUNTER — RESULT REVIEW (OUTPATIENT)
Age: 62
End: 2022-10-18

## 2022-10-18 ENCOUNTER — OUTPATIENT (OUTPATIENT)
Dept: OUTPATIENT SERVICES | Facility: HOSPITAL | Age: 62
LOS: 1 days | Discharge: HOME | End: 2022-10-18

## 2022-10-18 DIAGNOSIS — R92.8 OTHER ABNORMAL AND INCONCLUSIVE FINDINGS ON DIAGNOSTIC IMAGING OF BREAST: ICD-10-CM

## 2022-10-18 DIAGNOSIS — Z98.890 OTHER SPECIFIED POSTPROCEDURAL STATES: Chronic | ICD-10-CM

## 2022-10-18 DIAGNOSIS — Z90.49 ACQUIRED ABSENCE OF OTHER SPECIFIED PARTS OF DIGESTIVE TRACT: Chronic | ICD-10-CM

## 2022-10-18 DIAGNOSIS — Z90.89 ACQUIRED ABSENCE OF OTHER ORGANS: Chronic | ICD-10-CM

## 2022-10-18 DIAGNOSIS — D36.9 BENIGN NEOPLASM, UNSPECIFIED SITE: Chronic | ICD-10-CM

## 2022-10-18 DIAGNOSIS — Z98.891 HISTORY OF UTERINE SCAR FROM PREVIOUS SURGERY: Chronic | ICD-10-CM

## 2022-10-18 PROCEDURE — G0279: CPT | Mod: 26

## 2022-10-18 PROCEDURE — 77065 DX MAMMO INCL CAD UNI: CPT | Mod: 26,LT

## 2022-10-18 PROCEDURE — 76641 ULTRASOUND BREAST COMPLETE: CPT | Mod: 26,50

## 2022-10-24 ENCOUNTER — NON-APPOINTMENT (OUTPATIENT)
Age: 62
End: 2022-10-24

## 2022-11-18 ENCOUNTER — APPOINTMENT (OUTPATIENT)
Dept: BREAST CENTER | Facility: CLINIC | Age: 62
End: 2022-11-18

## 2022-11-23 ENCOUNTER — NON-APPOINTMENT (OUTPATIENT)
Age: 62
End: 2022-11-23

## 2022-12-22 ENCOUNTER — NON-APPOINTMENT (OUTPATIENT)
Age: 62
End: 2022-12-22

## 2022-12-30 ENCOUNTER — NON-APPOINTMENT (OUTPATIENT)
Age: 62
End: 2022-12-30

## 2023-02-17 ENCOUNTER — NON-APPOINTMENT (OUTPATIENT)
Age: 63
End: 2023-02-17

## 2023-02-22 ENCOUNTER — LABORATORY RESULT (OUTPATIENT)
Age: 63
End: 2023-02-22

## 2023-03-01 ENCOUNTER — APPOINTMENT (OUTPATIENT)
Dept: HEMATOLOGY ONCOLOGY | Facility: CLINIC | Age: 63
End: 2023-03-01

## 2023-03-01 ENCOUNTER — OUTPATIENT (OUTPATIENT)
Dept: OUTPATIENT SERVICES | Facility: HOSPITAL | Age: 63
LOS: 1 days | End: 2023-03-01
Payer: MEDICARE

## 2023-03-01 ENCOUNTER — APPOINTMENT (OUTPATIENT)
Dept: HEMATOLOGY ONCOLOGY | Facility: CLINIC | Age: 63
End: 2023-03-01
Payer: MEDICARE

## 2023-03-01 VITALS
DIASTOLIC BLOOD PRESSURE: 84 MMHG | SYSTOLIC BLOOD PRESSURE: 122 MMHG | HEART RATE: 94 BPM | WEIGHT: 170 LBS | BODY MASS INDEX: 33.2 KG/M2

## 2023-03-01 VITALS — TEMPERATURE: 98 F

## 2023-03-01 DIAGNOSIS — Z98.891 HISTORY OF UTERINE SCAR FROM PREVIOUS SURGERY: Chronic | ICD-10-CM

## 2023-03-01 DIAGNOSIS — D36.9 BENIGN NEOPLASM, UNSPECIFIED SITE: Chronic | ICD-10-CM

## 2023-03-01 DIAGNOSIS — Z98.890 OTHER SPECIFIED POSTPROCEDURAL STATES: Chronic | ICD-10-CM

## 2023-03-01 DIAGNOSIS — C50.811 MALIGNANT NEOPLASM OF OVERLAPPING SITES OF RIGHT FEMALE BREAST: ICD-10-CM

## 2023-03-01 DIAGNOSIS — Z90.49 ACQUIRED ABSENCE OF OTHER SPECIFIED PARTS OF DIGESTIVE TRACT: Chronic | ICD-10-CM

## 2023-03-01 DIAGNOSIS — Z90.89 ACQUIRED ABSENCE OF OTHER ORGANS: Chronic | ICD-10-CM

## 2023-03-01 LAB
ALBUMIN SERPL ELPH-MCNC: 4.8 G/DL
ALP BLD-CCNC: 82 U/L
ALT SERPL-CCNC: 24 U/L
ANION GAP SERPL CALC-SCNC: 13 MMOL/L
AST SERPL-CCNC: 21 U/L
BILIRUB DIRECT SERPL-MCNC: <0.2 MG/DL
BILIRUB INDIRECT SERPL-MCNC: >0 MG/DL
BILIRUB SERPL-MCNC: 0.2 MG/DL
BUN SERPL-MCNC: 17 MG/DL
CALCIUM SERPL-MCNC: 10.1 MG/DL
CHLORIDE SERPL-SCNC: 102 MMOL/L
CO2 SERPL-SCNC: 26 MMOL/L
CREAT SERPL-MCNC: 0.7 MG/DL
EGFR: 98 ML/MIN/1.73M2
ESTIMATED AVERAGE GLUCOSE: 140 MG/DL
GLUCOSE SERPL-MCNC: 147 MG/DL
HBA1C MFR BLD HPLC: 6.5 %
HCT VFR BLD CALC: 38.7 %
HGB A MFR BLD: 97.6 %
HGB A2 MFR BLD: 2.4 %
HGB BLD-MCNC: 12.5 G/DL
HGB FRACT BLD-IMP: NORMAL
MCHC RBC-ENTMCNC: 27.4 PG
MCHC RBC-ENTMCNC: 32.3 G/DL
MCV RBC AUTO: 84.9 FL
PLATELET # BLD AUTO: 361 K/UL
PMV BLD: 9.2 FL
POTASSIUM SERPL-SCNC: 4.3 MMOL/L
PROT SERPL-MCNC: 7.3 G/DL
RBC # BLD: 4.56 M/UL
RBC # FLD: 13.6 %
SODIUM SERPL-SCNC: 141 MMOL/L
T4 FREE SERPL-MCNC: 1.6 NG/DL
TSH SERPL-ACNC: 0.94 UIU/ML
WBC # FLD AUTO: 5.51 K/UL

## 2023-03-01 PROCEDURE — 99214 OFFICE O/P EST MOD 30 MIN: CPT

## 2023-03-02 DIAGNOSIS — C50.811 MALIGNANT NEOPLASM OF OVERLAPPING SITES OF RIGHT FEMALE BREAST: ICD-10-CM

## 2023-03-04 NOTE — ASSESSMENT
[FreeTextEntry1] : 63 yo female has stage IIIA ([id number]) IDC of the right breast, G1, ER/CT positive, Her-2 negative, s/p simple right mastectomy, SLNB, implant reconstruction and left breast reduction.\par Oncotype RS is 20.\par \par Assessment and Plan:\par -- Continue Exemestane daily.\par -- S/P right mastectomy and PMRT. There is no palpable abnormality. The  left dx mammo and b/l US in 10/.2022  showed Heterogeneous massed in the left and right breast. Short interval follow-up recommended. She has an appt.\par -- We discussed bone health management since AI may reduce bone density. Bone density from 6/11/22 was within normal limit. Continue calcium and vitamin D supplement. Encourage health life style and regular physical activities. \par -- Will order blood work today: CBC, BMP, LFT, HbA1c, TSH, Free T4. \par -- Followup with breast surgeon and Dr. Salazar. \par -- RTO for f/u in 6 months.

## 2023-03-04 NOTE — HISTORY OF PRESENT ILLNESS
[de-identified] : 61 yo female was referred by Dr. Rodriguez for consultation of breast cancer. She has h/o right breast intraductal papilloma, s/p R WLE on 1/22/2021. MRI breast on 12/17/21 showed large irregular nonmass enhancement in the right superior breast.  No right axillary adenopathy. MRI guided biopsy of 2 areas was recommended. Scattered foci of nonspecific enhancement in the left breast. Enlarged left axillary lymph nodes consistent with recent vaccine history. Low-lying left axillary lymph node also noted.\par \par \par On 2/25/22, She had MRI guided biopsies x 2:\par 1. Breast, right lateral irregular non-mass like enhancement, MRI guided needle core biopsies:\par - Invasive well differentiated mammary carcinoma with both tubular and lobular features (tubulo-lobular carcinoma), ER positive, 100%, ID positive 85%, Ki 67 3-5%, Her-2 negative.\par - Ductal carcinoma in-situ (DCIS), micropapillary and solid types with comedo necrosis and microcalcifications, intermediate nuclear grade.\par - Focal atypical lobular hyperplasia (ALH) and proliferative type fibrocystic changes associated with microcalcifications.\par \par 2. Breast, right medial irregular non-mass like enhancement, MRI guided needle core biopsies:\par - Invasive well differentiated mammary carcinoma with both tubular and lobular features (tubulo-lobular carcinoma) associated, ER pos 100%, ID pos 90%, Her-2 negative, Ki 67 7-10%.\par - Ductal carcinoma in-situ (DCIS), micropapillary and solid types with comedo necrosis and microcalcifications, intermediate nuclear grade; extending to involve a sclerosing intraductal papilloma ("atypical papilloma").\par - Focal atypical lobular hyperplasia (ALH) and proliferative type fibrocystic changes associated with microcalcifications.\par \par On 4/13/22, she underwent right breast skin sparing simple mastectomy, SLNB and left breast reduction. The pathology revealed 5.4 cm invasive well differentiated ductal, non-extensive ductal carcinoma in situ (DCIS), low to intermediate nuclear grade. Numerous foci of lymphovascular invasion are present. One SLN showed metastatic carcinoma with the largest contiguous focus measuring 5.5 mm. Metastatic tumor cells are present within lymphovascular spaces of the extracapsular soft tissue. One of two right non-sentinel axillary lymph nodes showed micrometastatic carcinoma measuring 0.26 mm. AJCC Eighth Edition Pathologic Stage: pT3, pN1a, pMx.\par Oncotype RS is 20. \par \par She is recovering from surgery and there is small nonhealing area at incision site. She has been followed by Dr. Brambila. She is here with her daughter to discuss systemic adjuvant therapy.  [de-identified] : 08/31/2022\julissa Kitchen is here for follow up visit. She has stage IIIA ([id number]) IDC of the right breast, G1, ER/VT positive, Her-2 negative, s/p simple right mastectomy, SLNB, implant reconstruction and left breast reduction on 4/13/22. Oncotype RS is 20.  8/2/2022, having a target US of the right breast, ordered by Dr. Rodriguez, in which shows, probable developing scar/fat necrosis along the right breast mastectomy site. Sonographic follow-up is recommended in 3 months. She just completed PMRT to right chest and feels skin irritation. She has been taking Letrozole since 5/2022 and tolerated well. She noted hair thinning.\par \par 3/1/23:\julissa Kitchen is here for follow up visit. She has stage IIIA ([id number]) IDC of the right breast, G1, ER/VT positive, Her-2 negative, s/p simple right mastectomy, SLNB, implant reconstruction and left breast reduction on 4/13/22. Oncotype RS is 20.  She received adjuvant PMRT completed in 8/2022. She started Letrozole in 5/2022 and switched to Exemestane in 12/2022 due to arthralgia. She had  left dx mammo and b/l US in 10/.2022 which showed Heterogeneous massed in the left and right breast. Short interval follow-up recommended. Bone density from 6/2022 was normal. She is feeling ok and no new complains.

## 2023-03-04 NOTE — CONSULT LETTER
[Dear  ___] : Dear  [unfilled], [Courtesy Letter:] : I had the pleasure of seeing your patient, [unfilled], in my office today. [Please see my note below.] : Please see my note below. [Consult Closing:] : Thank you very much for allowing me to participate in the care of this patient.  If you have any questions, please do not hesitate to contact me. [Sincerely,] : Sincerely, [DrCarol  ___] : Dr. TYLER [DrCarol ___] : Dr. TYLER [FreeTextEntry3] : Alma Martinez MD

## 2023-03-04 NOTE — PHYSICAL EXAM
[Fully active, able to carry on all pre-disease performance without restriction] : Status 0 - Fully active, able to carry on all pre-disease performance without restriction [Normal] : affect appropriate [de-identified] : S/P right breast mastectomy, SLNB and left breast reduction. S/P PMRT to right chest wall with skin reaction and erythema.

## 2023-03-15 ENCOUNTER — APPOINTMENT (OUTPATIENT)
Dept: RADIATION ONCOLOGY | Facility: HOSPITAL | Age: 63
End: 2023-03-15
Payer: MEDICARE

## 2023-03-15 ENCOUNTER — OUTPATIENT (OUTPATIENT)
Dept: OUTPATIENT SERVICES | Facility: HOSPITAL | Age: 63
LOS: 1 days | End: 2023-03-15
Payer: MEDICARE

## 2023-03-15 ENCOUNTER — APPOINTMENT (OUTPATIENT)
Dept: RADIATION ONCOLOGY | Facility: HOSPITAL | Age: 63
End: 2023-03-15

## 2023-03-15 ENCOUNTER — NON-APPOINTMENT (OUTPATIENT)
Age: 63
End: 2023-03-15

## 2023-03-15 VITALS
TEMPERATURE: 96.4 F | BODY MASS INDEX: 33.09 KG/M2 | OXYGEN SATURATION: 98 % | WEIGHT: 169.44 LBS | DIASTOLIC BLOOD PRESSURE: 83 MMHG | SYSTOLIC BLOOD PRESSURE: 167 MMHG | HEART RATE: 89 BPM | RESPIRATION RATE: 16 BRPM

## 2023-03-15 DIAGNOSIS — C50.811 MALIGNANT NEOPLASM OF OVERLAPPING SITES OF RIGHT FEMALE BREAST: ICD-10-CM

## 2023-03-15 DIAGNOSIS — D36.9 BENIGN NEOPLASM, UNSPECIFIED SITE: Chronic | ICD-10-CM

## 2023-03-15 DIAGNOSIS — Z98.891 HISTORY OF UTERINE SCAR FROM PREVIOUS SURGERY: Chronic | ICD-10-CM

## 2023-03-15 DIAGNOSIS — Z98.890 OTHER SPECIFIED POSTPROCEDURAL STATES: Chronic | ICD-10-CM

## 2023-03-15 DIAGNOSIS — Z90.49 ACQUIRED ABSENCE OF OTHER SPECIFIED PARTS OF DIGESTIVE TRACT: Chronic | ICD-10-CM

## 2023-03-15 DIAGNOSIS — Z90.89 ACQUIRED ABSENCE OF OTHER ORGANS: Chronic | ICD-10-CM

## 2023-03-15 DIAGNOSIS — M25.619 STIFFNESS OF UNSPECIFIED SHOULDER, NOT ELSEWHERE CLASSIFIED: ICD-10-CM

## 2023-03-15 PROCEDURE — 99213 OFFICE O/P EST LOW 20 MIN: CPT

## 2023-03-15 PROCEDURE — 99213 OFFICE O/P EST LOW 20 MIN: CPT | Mod: TC

## 2023-03-15 RX ORDER — LETROZOLE TABLETS 2.5 MG/1
2.5 TABLET, FILM COATED ORAL DAILY
Qty: 90 | Refills: 2 | Status: DISCONTINUED | COMMUNITY
Start: 2022-05-10 | End: 2023-03-15

## 2023-03-16 NOTE — PHYSICAL EXAM
[Sclera] : the sclera and conjunctiva were normal [Hearing Threshold Finger Rub Not Latimer] : hearing was normal [Heart Sounds] : normal S1 and S2 [Heart Rate And Rhythm] : heart rate and rhythm were normal [Murmurs] : no murmurs present [Edema] : no peripheral edema present [Breast Palpation Implant ___cm In The Right Breast] : an implant [Breast Reconstruction Right] : breast reconstruction [Breast Reconstruction Left] : breast reconstruction [No Masses] : no breast masses were palpable [Abdomen Soft] : soft [Abdomen Tenderness] : non-tender [Cervical Lymph Nodes Enlarged Posterior Bilaterally] : posterior cervical [Cervical Lymph Nodes Enlarged Anterior Bilaterally] : anterior cervical [Supraclavicular Lymph Nodes Enlarged Bilaterally] : supraclavicular [Nail Clubbing] : no clubbing  or cyanosis of the fingernails [No Focal Deficits] : no focal deficits [Motor Exam] : the motor exam was normal [Enlargement Of The Right Breast] : no swelling [Tenderness Of The Right Breast] : no tenderness [___] :  no erythema [Enlargement Of The Left Breast] : no swelling [Tenderness Of The Left Breast] : no tenderness [Axillary Lymph Nodes Enlarged Bilaterally] : no enlarged nodes [Normal] : no palpable adenopathy [de-identified] : right recon breast with irregularity on palpation  (fat necrosis seen on imaging)

## 2023-03-16 NOTE — HISTORY OF PRESENT ILLNESS
[FreeTextEntry1] : KEVON TERRELL returns to clinic in follow up visit.  As you know, the patient is a 62 year old F with a diagnosis of invasive ductal carcinoma of the right breast, G1, ER/MT positive / HER 2 negative / upper outer and upper inner quadrant T3 N1a M0, stage IB. She is s/p right mastectomy and left breast reduction on 4/13/2022.  She declined chemotherapy.  She tolerated treatments with expected side effects of erythema and fatigue using 3D technique.  During her treatments, she was exposed to COVID 19, in which safety precautions were followed.  She had mild symptoms and recovered well.   The patient received 5000 cGy to the left chest wall and supraclavicular fossa from 7/19/2022  through 8/24/2022 without complications.  I last saw her in September, 2022. In the interim, the patient has done well.  She states, her right recon. breast feels tighter and has lumps.  Imaging shows, fat necrosis.   She was switched from Letrozole to Exemestane,  tolerating it well.  However, she mentions her insurance is charging $238.50 for 90 days supply.  She was advised to reach out to Dr. Martinez's office to see if they can provide assistance in this matter.   Also, she feels her right UE has limited ROM with extension.  We discussed going to physical therapy and taking OTC Tylenol for discomfort.  She is accompanied by her daughter.   \par \par 10/18/2022 Dx left mammo & US, right breast US shows, left breast: At 11:00 N2 to 3 there is an oval circumscribed heterogeneous mass measuring 0.9 x 0.9 x 0.7 cm likely fat necrosis. Short interval follow-up recommended.  There is no other solid or cystic mass noted in the left breast. No left axillary lymphadenopathy.\par Reconstructed right breast.  At 9:00 N4 there is redemonstration of a stable heterogeneous mass measuring 3.6 x 1.7 cm likely fat necrosis. Short interval follow-up recommended.   At 8:00 N7 there is a heterogeneous mass measuring 1.7 x 0.6 x 0.3 cm likely fat necrosis. Short interval follow-up recommended.   No right axillary lymph node.\par Follow-up breast ultrasound in 6 months.  BI-RADS category 3: Probably Benign\par \par Upcoming appointments:\par Mariaelena \par Dr. Martinez 9/13/2023\par Dr. Brambila 6/19/2023\par \par

## 2023-03-16 NOTE — REVIEW OF SYSTEMS
[Joint Pain] : joint pain [Muscle Pain] : muscle pain [Negative] : Psychiatric [Fever] : no fever [Chills] : no chills [Fatigue] : no fatigue [Chest Pain] : no chest pain [Palpitations] : no palpitations [Shortness Of Breath] : no shortness of breath [Wheezing] : no wheezing [Cough] : no cough [Muscle Weakness] : no muscle weakness [Gait Disturbance] : no gait disturbance [Hot Flashes] : no hot flashes [FreeTextEntry9] : right UE /shoulder

## 2023-03-16 NOTE — DISEASE MANAGEMENT
[Pathological] : TNM Stage: p [IB] : IB [FreeTextEntry4] : invasive ductal carcinoma of the right breast, G1, ER/CT positive / HER 2 negative / upper outer and upper inner quadrant [NTNM] : 1a [TTNM] : 3 [MTNM] : 0 [de-identified] : recon right breast

## 2023-03-24 DIAGNOSIS — Z51.81 ENCOUNTER FOR THERAPEUTIC DRUG LEVEL MONITORING: ICD-10-CM

## 2023-04-12 DIAGNOSIS — C50.811 MALIGNANT NEOPLASM OF OVERLAPPING SITES OF RIGHT FEMALE BREAST: ICD-10-CM

## 2023-04-19 ENCOUNTER — OUTPATIENT (OUTPATIENT)
Dept: OUTPATIENT SERVICES | Facility: HOSPITAL | Age: 63
LOS: 1 days | End: 2023-04-19
Payer: MEDICARE

## 2023-04-19 DIAGNOSIS — Z98.890 OTHER SPECIFIED POSTPROCEDURAL STATES: Chronic | ICD-10-CM

## 2023-04-19 DIAGNOSIS — M25.619 STIFFNESS OF UNSPECIFIED SHOULDER, NOT ELSEWHERE CLASSIFIED: ICD-10-CM

## 2023-04-19 DIAGNOSIS — Z98.891 HISTORY OF UTERINE SCAR FROM PREVIOUS SURGERY: Chronic | ICD-10-CM

## 2023-04-19 DIAGNOSIS — Z90.49 ACQUIRED ABSENCE OF OTHER SPECIFIED PARTS OF DIGESTIVE TRACT: Chronic | ICD-10-CM

## 2023-04-19 DIAGNOSIS — D36.9 BENIGN NEOPLASM, UNSPECIFIED SITE: Chronic | ICD-10-CM

## 2023-04-19 DIAGNOSIS — Z90.89 ACQUIRED ABSENCE OF OTHER ORGANS: Chronic | ICD-10-CM

## 2023-04-19 PROCEDURE — 97140 MANUAL THERAPY 1/> REGIONS: CPT | Mod: GO

## 2023-04-19 PROCEDURE — 97167 OT EVAL HIGH COMPLEX 60 MIN: CPT | Mod: GO

## 2023-04-20 DIAGNOSIS — M25.619 STIFFNESS OF UNSPECIFIED SHOULDER, NOT ELSEWHERE CLASSIFIED: ICD-10-CM

## 2023-04-25 ENCOUNTER — RESULT REVIEW (OUTPATIENT)
Age: 63
End: 2023-04-25

## 2023-04-25 ENCOUNTER — OUTPATIENT (OUTPATIENT)
Dept: OUTPATIENT SERVICES | Facility: HOSPITAL | Age: 63
LOS: 1 days | End: 2023-04-25

## 2023-04-25 ENCOUNTER — OUTPATIENT (OUTPATIENT)
Dept: OUTPATIENT SERVICES | Facility: HOSPITAL | Age: 63
LOS: 1 days | End: 2023-04-25
Payer: MEDICARE

## 2023-04-25 DIAGNOSIS — Z90.49 ACQUIRED ABSENCE OF OTHER SPECIFIED PARTS OF DIGESTIVE TRACT: Chronic | ICD-10-CM

## 2023-04-25 DIAGNOSIS — Z98.890 OTHER SPECIFIED POSTPROCEDURAL STATES: Chronic | ICD-10-CM

## 2023-04-25 DIAGNOSIS — R92.8 OTHER ABNORMAL AND INCONCLUSIVE FINDINGS ON DIAGNOSTIC IMAGING OF BREAST: ICD-10-CM

## 2023-04-25 DIAGNOSIS — M25.619 STIFFNESS OF UNSPECIFIED SHOULDER, NOT ELSEWHERE CLASSIFIED: ICD-10-CM

## 2023-04-25 DIAGNOSIS — Z98.891 HISTORY OF UTERINE SCAR FROM PREVIOUS SURGERY: Chronic | ICD-10-CM

## 2023-04-25 DIAGNOSIS — D36.9 BENIGN NEOPLASM, UNSPECIFIED SITE: Chronic | ICD-10-CM

## 2023-04-25 DIAGNOSIS — Z90.89 ACQUIRED ABSENCE OF OTHER ORGANS: Chronic | ICD-10-CM

## 2023-04-25 DIAGNOSIS — Z00.8 ENCOUNTER FOR OTHER GENERAL EXAMINATION: ICD-10-CM

## 2023-04-25 PROCEDURE — 76642 ULTRASOUND BREAST LIMITED: CPT | Mod: 50

## 2023-04-25 PROCEDURE — 76642 ULTRASOUND BREAST LIMITED: CPT | Mod: 26,50

## 2023-04-26 DIAGNOSIS — R92.8 OTHER ABNORMAL AND INCONCLUSIVE FINDINGS ON DIAGNOSTIC IMAGING OF BREAST: ICD-10-CM

## 2023-04-28 ENCOUNTER — OUTPATIENT (OUTPATIENT)
Dept: OUTPATIENT SERVICES | Facility: HOSPITAL | Age: 63
LOS: 1 days | End: 2023-04-28

## 2023-04-28 DIAGNOSIS — Z98.891 HISTORY OF UTERINE SCAR FROM PREVIOUS SURGERY: Chronic | ICD-10-CM

## 2023-04-28 DIAGNOSIS — Z98.890 OTHER SPECIFIED POSTPROCEDURAL STATES: Chronic | ICD-10-CM

## 2023-04-28 DIAGNOSIS — M25.619 STIFFNESS OF UNSPECIFIED SHOULDER, NOT ELSEWHERE CLASSIFIED: ICD-10-CM

## 2023-04-28 DIAGNOSIS — Z90.49 ACQUIRED ABSENCE OF OTHER SPECIFIED PARTS OF DIGESTIVE TRACT: Chronic | ICD-10-CM

## 2023-04-28 DIAGNOSIS — Z90.89 ACQUIRED ABSENCE OF OTHER ORGANS: Chronic | ICD-10-CM

## 2023-04-28 DIAGNOSIS — D36.9 BENIGN NEOPLASM, UNSPECIFIED SITE: Chronic | ICD-10-CM

## 2023-05-03 ENCOUNTER — APPOINTMENT (OUTPATIENT)
Dept: BREAST CENTER | Facility: CLINIC | Age: 63
End: 2023-05-03
Payer: MEDICARE

## 2023-05-03 VITALS
HEIGHT: 60 IN | WEIGHT: 169 LBS | BODY MASS INDEX: 33.18 KG/M2 | SYSTOLIC BLOOD PRESSURE: 135 MMHG | DIASTOLIC BLOOD PRESSURE: 88 MMHG

## 2023-05-03 PROCEDURE — 99213 OFFICE O/P EST LOW 20 MIN: CPT

## 2023-05-03 NOTE — PHYSICAL EXAM
[Normocephalic] : normocephalic [Atraumatic] : atraumatic [No Supraclavicular Adenopathy] : no supraclavicular adenopathy [No Rashes] : no rashes [No Ulceration] : no ulceration [Breast Nipple Inversion Left] : nipple not inverted [Breast Nipple Retraction Left] : nipple not retracted [de-identified] : well healed surgical scars. [de-identified] : s/p right mastectomy w/ implant reconstruction. capsular contracture noted.\par palpable nodules noted - correlate with fat necrosis on imaging.  [de-identified] : palpable scar tissue / fat necrosis noted. [de-identified] : No axillary lymphadenopathy appreciated. [de-identified] : No axillary lymphadenopathy appreciated. [de-identified] : (+) right upper extremity edema.

## 2023-05-03 NOTE — DATA REVIEWED
[FreeTextEntry1] : B/L Breast Sono - 04/25/2023:\par FINDINGS:\par \par Right breast:\par -Oval, circumscribed, hypoechoic mass in the right breast, 9:00 axis, 4 cm from the nipple has decreased in size from 8/2/2022 measuring 1.4 x 2.9 x 0.8 cm, previously measuring 4.0 x 1.0 x 3.3 cm. This supports a benign etiology, likely developing fat necrosis. Continued follow-up is recommended to demonstrate stability.\par -Oval, circumscribed, anechoic mass with low-level internal echoes in the right breast, 8:00 axis, 7 cm from the nipple, stable from 10/18/2022 measuring up to 1.7 cm. This is also likely developing fat necrosis and probably benign. Continued follow-up is recommended.\par \par Left breast:\par -Round, isoechoic mass in the left breast, 11:00 axis, 2 to 3 cm from the nipple, likely developing fat necrosis and stable from 10/18/2022 measuring up to 0.9 cm. Continued follow-up is recommended.\par \par \par IMPRESSION:\par \par Stable probably benign masses in both breasts.\par \par Recommendation: Follow-up unilateral diagnostic mammogram and ultrasound in 6 months.\par \par \par BI-RADS category 3: Probably Benign\par

## 2023-05-03 NOTE — ASSESSMENT
[FreeTextEntry1] : Imtiaz is a 62 F with a Right breast intraductal papilloma, s/p R WLE on 1/22/2021, now with a MR detected RIGHT breast invasive mammary carcinoma, [id number], ER/VA pos, her 2 neg, anatomic stage IIIA, prognostic stage IB, s/p R mastectomy, SLN Bx, with immediate reconstruction ([facility]) with direct to silicone implant and concurrent L breast reduction/mastopexy for symmetry on 4/13/2022, AJCC 8th edition. \par \par 4/13/2022 -- R mastectomy, SLN Bx, with immediate reconstruction ([facility]) with direct to silicone implant and concurrent L breast reduction/mastopexy for symmetry\par 1. 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 R SLN Bx \par -1/1 SLN positive for mets, measuring 5.5 mm, metastatic tumor cell present within the lymphovascular spaces of the extracapsular soft tissue \par -1/2 non sentinel nodes positive for micrometastatic carcinoma, no CAROLINE \par 3. R additional tissue and skin \par -benign \par 4. left breast contents \par -benign with some areas of proliferative type changes \par [id number], ER/VA pos, Her 2 neg, anatomic stage IIIA, prognostic stage 1B\par \par 1/22/2021 -- R WLE \par -sclerosing papilloma \par -atypical ductal hyperplasia \par \par \par Since her last visit she has no new breast related complaints.  She is having right upper extremity lymphedema and is currently under the care of the occupation therapy dept. \par \par Dr. Martinez - Letrozole.\par Dr. Katelynn Salazar -  s/p PMRT 07/19 - 08/24/2022 (5,000 cGy).\par \par Most recent imaging:\par B/L Breast Sono - 04/25/2023:\par Right breast:\par -Oval, circumscribed, hypoechoic mass in the right breast, 9:00 axis, 4 cm from the nipple has decreased in size from 8/2/2022 measuring 1.4 x 2.9 x 0.8 cm, previously measuring 4.0 x 1.0 x 3.3 cm. This supports a benign etiology, likely developing fat necrosis. Continued follow-up is recommended to demonstrate stability.\par -Oval, circumscribed, anechoic mass with low-level internal echoes in the right breast, 8:00 axis, 7 cm from the nipple, stable from 10/18/2022 measuring up to 1.7 cm. This is also likely developing fat necrosis and probably benign. Continued follow-up is recommended.\par Left breast:\par -Round, isoechoic mass in the left breast, 11:00 axis, 2 to 3 cm from the nipple, likely developing fat necrosis and stable from 10/18/2022 measuring up to 0.9 cm. Continued follow-up is recommended.\par BI-RADS category 3: Probably Benign\par \par Dr. Rodriguez had recommended patient have a B/L breast MRI in November 2022 - this was not done as of yet. \par \par On physical exam, well healed surgical scars. s/p right mastectomy w/ implant reconstruction. capsular contracture noted. palpable nodules noted - correlate with fat necrosis on imaging. \par \par She will be scheduled for a B/L Breast MRI at this time since this has not been performed. \par She should be screened with breast MRIs as her breast cancer was mammographically occult. \par We will discuss results as soon as they become available.\par \par If benign, she will be due for a Left Uni Dx Mammo & B/L Sono in October 2023.\par She will return for follow-up and clinical breast exam at that time as well. \par \par ---\par AS REVIEW: \par Her final pathology revealed a 5.4 cm well differentiated IDC, non-extensive DCIS that was low to intermediate grade, although there was numerous foci of LVI, negative surgical margins and 1 SLN with 5.5 mm of metastatic disease, 1/2 non-sentinel axillary lymph nodes with micrometastatic disease, and benign breast tissue in the left breast. \par \par We discussed her final pathology both at the time of her surgery and today.  Ordinarily, she would be recommended for a completion axillary lymph node dissection given that she had 1 positive SLN and 1/2 lymph nodes with micrometastatic disease.  Based off the AMAROS trial, the ten year follow up for these patients revealed that axillary radiation was not inferior to a completion axillary lymph node dissection.  The local regional recurrence for patients who underwent axillary radiation was 1.82% and for patients who underwent an axillary lymph node dissection, was 0.93%.  There was no statistical difference in overall survival or distant metastases free survival.  This was discussed with Imtiaz and her daughter, who agreed to omit the completion axillary lymph node dissection and proceed with RIGHT post mastectomy radiation therapy. \par \par However, given the locally advanced nature of her breast cancer, she will be referred to medical oncology for further evaluation of adjuvant treatments and she will be referred to radiation oncology for evaluation of R PMRT. \par \par She can follow up with me in three months. \par In the future, her left breast should be screened with both mammograms and MRIs as her tumor was initially mammographically occult. \par  \par AS REVIEW:\par Her most recent imaging was a breast MRI on 12/17/2021 which revealed in her right breast, extensive nonmass enhancement in R superior breast with areas of clumped irregular enhancement, spans 5.4 x 5.3 x 7.8 cm --> medial and lateral aspects were biopsied and found to be invasive mammary carcinoma.  No lymphadenopathy was appreciated in the right side. \par \par We have discussed her new diagnosis of breast cancer.  She is currently a stage IIB breast cancer, based off AJCC 8th edition.  We discussed her surgical and other treatment options at this time. \par \par In regards to her RIGHT sided breast cancer, she is NOT a great candidate for breast conservation surgery as her area of disease spans 7.8 cm.  Her lesion is located in the superior breast  based off the MRI so she is a candidate for a nipple sparing mastectomy.  However, there is no difference in survival between a lumpectomy + radiation therapy and a mastectomy.  However the rate of local recurrence is slightly higher with the lumpectomy. The rate of recurrence with a mastectomy is not zero, however, and is likely closer to 4-9%.  \par \par Regardless of if she chooses a mastectomy or a lumpectomy, she would require an evaluation of her axillary lymph nodes via a sentinel lymph node biopsy.  This is done by injecting the perioareolar breast tissue with dye prior to the surgery and removing 1-5 lymph nodes that are the first to drain the breast.  \par \par At this time, I have recommended proceeding with a R mastectomy and SLN Bx given the extent of disease. However if she was very motivated to undergo breast conservation surgery, we could consider performing a wide local excision with bracketted RF tags x 2 followed by an oncoplastic reduction, although we did discuss that if her margins were positive we would need to proceed with a mastectomy at that time.  In addition, given that her breast cancer was mammographically and sonographically occult, it would be more difficult to assess for recurrences in the future.  \par \par We did briefly discuss the different radiation options.  If she got a mastectomy, she would likely only need radiation therapy if she had a lot of positive margins or positive lymph nodes. If she undergoes a lumpectomy, she is a candidate for whole breast irradiation.  \par \par In regards to systemic therapy, we briefly discussed both chemotherapy and endocrine therapy. If the tumor is larger than 1 cm and her 2 negative, we would likely need to send an additional study on her tumor sample, called an oncotype DX to make the determination regarding if chemotherapy would help her.  At the completion of all these treatments, she should get endocrine therapy, at current time she would need an AI, for a total of at least 5 years.  \par \par If her HER2 was positive, I have also given her the option of neoadjuvant chemotherapy with Her 2 directed therapy as a means to shrink her tumor and possible avoid having to perform a mastectomy.  She is interested in this option if she is Her 2 positive.  We will call her back when the Her 2 is finalized. \par \par Per ASBrS consensus guidelines, any patient with a diagnosis of breast cancer should be offered panel genetic testing as 10% of these patients were found to have a mutation.  THis was offered to her and she would like to proceed with panel genetic testing.  She will let us know if she would like to proceed with testing. \par \par In regards to reconstruction, if she decides to proceed with a mastectomy,  she has the option for undergoing immediate breast reconstruction.  For this reason, I will have her see plastic surgery for further discussion of her reconstruction options.  \par  \par AS REVIEW: \par Her most recent imaging on 07/23/21 which revealed, postsurgical architectural distortion in the right breast, and on R breast US, @12:00 N5, she has an oval circumscribed mass measuring 0.4 x 0.4 x 0.3 cm favored to be simple cyst, deemed BI-RADS Category 2: Benign. \par \par Her final pathology revealed a papilloma with atypical ductal hyperplasia, but no evidence of cancer.  No further surgical intervention is indicated at this time.  \par \par AS REVIEW: \par Her most recent imaging was a R dx mammogram and US on 9/18/2020 which revealed a change in her previously visualized calcifications in her retroareolar area, which was her biopsy proven intraductal papilloma.  Her previously visualized mass @9N3 is no longer seen. \par \par Intraductal papillomas without atypia are considered fibroproliferative lesions without atypia.  Patients with these lesions were found to have a slightly increased relative risk of breast cancer compared to the reference population.  However the lesions themselves do not have any malignant potential. \par \par We discussed dense breasts.  Increasing breast density has been found to increase ones risk of breast cancer, but at this time, there is no clear indication for additional imaging in this setting, as both US and MRI have not been found to improve survival.  One can consider bilateral screening US.  However, out of 1000 women screened, the use of routine US will only identify an additional 3-5 cancers.  The use of US was found to increase the likelihood of undergoing more imaging and more biopsies.  She does have dense breasts.  We have decided to proceed with screening bilateral breast US at this time.  This will be scheduled with her next screening mammogram.\par \par We discussed atypical ductal hyperplasia.  These are considered benign high risk lesions.  Additionally, the presence of ADH has been associated with an increased lifetime risk of breast cancer by about 4 fold.\par \par In light of her new diagnosis of atypical ductal hyperplasia, her risk assessment is as follows: \par RISK ASSESSMENT: \par Tanja\par 5yr -- 3.3%\par lifetime -- 16.2%\par \par TC v6 \par 5yr -- 5.3%\par lifetime -- 20.6%\par \par This puts her in the high risk category for breast cancer because she has a lifetime risk of >20%.  She qualifies for annual screening MRIs which would be done in an alternating fashion with her screening mammograms such that an imaging study and clinical breast exam would be performed every 6 months.  The use of MRIs have not been shown to prolong survival, however out of 1000 women screened, an additional 14-15 cancers will be identified.  The use of MRIs, has, however, been shown to increase the number of procedures and additional imaging because although it is a very sensitive test, it is not as specific.  This was discussed with the patient and she would like to think about proceeding with screening MRIs.  \par \par ----\par \par \par PLAN: \par -She will be scheduled for a B/L Breast MRI at this time since this has not been performed. \par She should be screened with breast MRIs as her breast cancer was mammographically occult. \par We will discuss results as soon as they become available.\par \par -If benign, she will be due for a Left Uni Dx Mammo & B/L Sono in October 2023.\par She will return for follow-up and clinical breast exam at that time as well. \par \par -Continue follow-up with Occupational Therapy, Med Onc, Rad Onc and Plastic Surgery as scheduled. \par \par \par I spent a total of 20 minutes of face to face time with this patient, greater than 50% of which was spent in counseling and/or coordination of care.\par All of her questions were appropriately answered.\par She knows to call with any concerns.\par

## 2023-05-03 NOTE — HISTORY OF PRESENT ILLNESS
[FreeTextEntry1] : Imtiaz is a 62 F with a Right breast intraductal papilloma, s/p R WLE on 2021, now with a MR detected RIGHT breast invasive mammary carcinoma, [id number], ER/UT pos, her 2 neg, anatomic stage IIIA, prognostic stage IB, s/p R mastectomy, SLN Bx, with immediate reconstruction ([facility]) with direct to silicone implant and concurrent L breast reduction/mastopexy for symmetry on 2022, AJCC 8th edition. \par \par 2022 -- R mastectomy, SLN Bx, with immediate reconstruction ([facility]) with direct to silicone implant and concurrent L breast reduction/mastopexy for symmetry\par 1. 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 R SLN Bx \par -/ SLN positive for mets, measuring 5.5 mm, metastatic tumor cell present within the lymphovascular spaces of the extracapsular soft tissue \par -1/2 non sentinel nodes positive for micrometastatic carcinoma, no CAROLINE \par 3. R additional tissue and skin \par -benign \par 4. 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 \par \par 2021 -- R WLE \par -sclerosing papilloma \par -atypical ductal hyperplasia \par \par Her work up was as follows: \par 2020 -- b/l screening mammogram \par -heterogenously dense breasts\par -calcifications in retroareolar R breast \par -no suspicious mass, microcalcifications or architectural distortion in L breast \par BIRADS 0 \par \par 2020 -- R dx mammogram \par -heterogenously dense breasts\par -oval circumscribed mass with calcifications in retroareolar R breast, measures 0.4 cm \par R US \par -@9N3, round circumscribed mass, measuring 3 x 2 x 2 mm\par BIRADS 4 \par \par 3/6/2020 -- repeat R dx mammogram and US \par -@9N3, fluctuating cyst, slightly decreased in size measuring 2 x 2 x 2 mm \par BIRADS 3 \par \par 2020 -- R dx mammogram and US \par -heterogenously dense breasts\par -change in calcifications in retroareolar R breast --> BIOPSY \par R US \par -@9N3, previously visualized mass is no longer seen \par BIRADS 4\par \par 10/1/2020 -- R stereotactic guided biopsy (tophat)\par -intraductal papilloma \par \par She has no breast related complaints at this time.  She denies any breast pain, has not palpated any new palpable masses in either breast and denies any nipple discharge or retraction.  \par \par HISTORICAL RISK FACTORS: \par -no prior breast biopsies or surgeries \par -no family history of breast or ovarian cancer \par -, age at first live birth was 28 \par -prior OCP use \par -no gyn surgeries\par \par INTERVAL HISTORY: \par Imtiaz returns for her follow up visit, s/p R WLE on 2021. \par \par Her final pathology revealed an atypical papilloma with atypical ductal hyperplasia.  \par Her risk assessment is as follows: \par RISK ASSESSMENT: \par Tanja\par 5yr -- 3.3%\par lifetime -- 16.2%\par \par TC v6 \par 5yr -- 5.3%\par lifetime -- 20.6%\par \par INTERVAL HISTORY:\par 2021 \par Imtiaz returns for a 6 month follow up for benign intermediate to high risk disease 2/2 R intraductal papilloma and atypical ductal hyperplasia. \par \par She has no breast related complaints at this time.  She denies any breast pain, has not palpated any new palpable masses in either breast and denies any nipple discharge or retraction.  \par \par Her most recent imaging on 21 which revealed, postsurgical architectural distortion in the right breast, and on R breast US, @12:00 N5, she has an oval circumscribed mass measuring 0.4 x 0.4 x 0.3 cm favored to be simple cyst, deemed BI-RADS Category 2: Benign. \par \par INTERVAL HISTORY: \par 3/2/2022\par Imtiaz returns for a short term follow up, now with a new diagnosis of a MR detected RIGHT breast invasive mammary carcinoma, [id number], ER/UT pos, her 2 neg, anatomic stage IIB, AJCC 8th edition. \par \par She called the office with two episodes of RIGHT bloody nipple discharge.  The first time was at the end of August, no testing was performed at this time as she had normal imaging one month prior.  She had a second episode of right bloody nipple discharge, but a much larger amount, and at this time she was sent for the following diagnostic imaging.  \par \par She has not had any further episodes of bloody nipple discharge and has not palpated any abnormal masses within either breast and otherwise healed well from her surgery. \par \par 2021 -- R dx mammogram and US \par -heterogenously dense breasts\par -architectural distortion in the RA region from recent surgery.  \par -scattered coarse benign appearing calcifications \par -no suspicious mass, calcs, or areas of architectural distortion \par R US \par -no solid or cystic lesion seen \par BIRADS 2 -- rec BREAST MRI \par \par 2021 -- MR breast \par RIGHT: \par -extensive nonmass enhancement in R superior breast with areas of clumped irregular enhancement, spans 5.4 x 5.3 x 7.8 cm --> BIOPSY medial and lateral aspects \par -no lymphadenopathy \par LEFT: \par -scattered foci of nonspecific enhancement\par -enlarged L axillary LN consistent with recent vaccine history \par BIRADS 4\par \par 2022 -- R MR guided biopsy x 2 \par 1. R MR Bx, lateral aspect of enhancement, hourglass clip \par -invasive mammary carcinoma with both tubular and lobular features \par -DCIS, intermediate nuclear grade \par -focal ALH \par -ER/UT pos (Michael ), Her 2 PENDING\par -Ki 67: 3-5%\par \par 2. R MR Bx, medial aspect of enhancement, minicork clip was placed but was extruded after the biopsy \par -invasive mammary carcinoma with both tubular and lobular features \par -DCIS, intermediate nuclear grade \par -focal ALH \par -ER/UT pos (Michael ), Her 2 PENDING\par -Ki 67: 7-10%\par \par 2022 -- L screening mammogram \par -heterogenously dense breasts\par -stable calcifications in L lateral breast, unchanged from 2018, c/w benign etiology \par -stable nodular asymmetries in L, unchanged from 2018 \par -no suspicious mass, calcs, architectural distortion in L \par BIRADS 2 \par \par INTERVAL HISTORY: \par 2022 \par Imtiaz is a 62 F with R breast cancer, s/p R mastectomy, SLN Bx, with immediate reconstruction ([facility]) with direct to silicone implant and concurrent L breast reduction/mastopexy for symmetry on 2022, here for her FIRST POST OP VISIT. \par \par She is feeling well.  Her pain is well controlled and she is healing well from her recent surgery.  She still has 1 ARNAV drain in place which is being managed by Dr. Brambila (plastic surgery). \par \par She denies any fevers or chills and her pain is well controlled. \par \par Her final pathology revealed a 5.4 cm well differentiated IDC, nonextensive DCIS that was low to intermediate grade, although there was numerous foci of LVI, negative surgical margins and 1 SLN with 5.5 mm of metastatic disease, 1/2 non-sentinel axillary lymph nodes with micrometastatic disease, and benign breast tissue in the left breast. \par \par INTERVAL HISTORY: \par 2022 \par Imtiaz is a 62 F with R breast cancer, s/p R mastectomy SLN Bx with immediate  reconstruction ([place]) with direct to silicone implant and concurrent L breast reduction/mastopexy for symmetry on 2022. \par \par She is here for a 3 month follow up visit. \par Since her last visit, she had an oncotype score of 20 and was recommended for letrozole. \par She is currently on her 7th treatment of R PMRT. \par \par For the last month, she was feeling a nodule in her right inferior/lateral breast.  She was seen by Dr. Brambila from plastic surgery who recommended breast massages as this appeared to be scar tissue.  This does not cause her pain and she denies any left sided breast complaints. \par \par A targeted R breast US was performed immediately following the consultation which revealed an oval circumscribed mass in R breast @9N4, measuring 4 x 1 x 3.3 cm, likely due to developing scar/fat necrosis --> BIRADS 3. \par \par 2023 --\par IMTIAZ TERRELL is a 63 year old female patient who presents today in follow up for R breast cancer, s/p R mastectomy SLN Bx with immediate  reconstruction ([place]) with direct to silicone implant and concurrent L breast reduction/mastopexy for symmetry on 2022. \par \par Since her last visit she has no new breast related complaints.  She is having right upper extremity lymphedema and is currently under the care of the occupation therapy dept. \par \par Dr. Martinez - Letrozole.\par Dr. Katelynn Salazar -  s/p PMRT  - 2022 (5,000 cGy).\par \par Most recent imaging:\par B/L Breast Sono - 2023:\par Right breast:\par -Oval, circumscribed, hypoechoic mass in the right breast, 9:00 axis, 4 cm from the nipple has decreased in size from 2022 measuring 1.4 x 2.9 x 0.8 cm, previously measuring 4.0 x 1.0 x 3.3 cm. This supports a benign etiology, likely developing fat necrosis. Continued follow-up is recommended to demonstrate stability.\par -Oval, circumscribed, anechoic mass with low-level internal echoes in the right breast, 8:00 axis, 7 cm from the nipple, stable from 10/18/2022 measuring up to 1.7 cm. This is also likely developing fat necrosis and probably benign. Continued follow-up is recommended.\par Left breast:\par -Round, isoechoic mass in the left breast, 11:00 axis, 2 to 3 cm from the nipple, likely developing fat necrosis and stable from 10/18/2022 measuring up to 0.9 cm. Continued follow-up is recommended.\par BI-RADS category 3: Probably Benign\par \par Dr. Rodriguez had recommended patient have a B/L breast MRI in 2022 - this was not done as of yet. \par \par She presents today for evaluation and imaging review.\par \par

## 2023-05-03 NOTE — REASON FOR VISIT
[Follow-Up: _____] : a [unfilled] follow-up visit [Family Member] : family member [FreeTextEntry1] : R breast cancer, s/p R mastectomy SLN Bx with immediate  reconstruction ([place]) with direct to silicone implant and concurrent L breast reduction/mastopexy for symmetry on 4/13/2022.

## 2023-05-04 ENCOUNTER — OUTPATIENT (OUTPATIENT)
Dept: OUTPATIENT SERVICES | Facility: HOSPITAL | Age: 63
LOS: 1 days | End: 2023-05-04
Payer: MEDICARE

## 2023-05-04 DIAGNOSIS — Z90.49 ACQUIRED ABSENCE OF OTHER SPECIFIED PARTS OF DIGESTIVE TRACT: Chronic | ICD-10-CM

## 2023-05-04 DIAGNOSIS — M25.619 STIFFNESS OF UNSPECIFIED SHOULDER, NOT ELSEWHERE CLASSIFIED: ICD-10-CM

## 2023-05-04 DIAGNOSIS — Z98.891 HISTORY OF UTERINE SCAR FROM PREVIOUS SURGERY: Chronic | ICD-10-CM

## 2023-05-04 DIAGNOSIS — Z90.89 ACQUIRED ABSENCE OF OTHER ORGANS: Chronic | ICD-10-CM

## 2023-05-04 DIAGNOSIS — Z98.890 OTHER SPECIFIED POSTPROCEDURAL STATES: Chronic | ICD-10-CM

## 2023-05-04 DIAGNOSIS — D36.9 BENIGN NEOPLASM, UNSPECIFIED SITE: Chronic | ICD-10-CM

## 2023-05-04 PROCEDURE — 97140 MANUAL THERAPY 1/> REGIONS: CPT | Mod: GO

## 2023-05-04 PROCEDURE — 97110 THERAPEUTIC EXERCISES: CPT | Mod: GO

## 2023-05-05 ENCOUNTER — OUTPATIENT (OUTPATIENT)
Dept: OUTPATIENT SERVICES | Facility: HOSPITAL | Age: 63
LOS: 1 days | End: 2023-05-05

## 2023-05-05 DIAGNOSIS — Z98.891 HISTORY OF UTERINE SCAR FROM PREVIOUS SURGERY: Chronic | ICD-10-CM

## 2023-05-05 DIAGNOSIS — M25.619 STIFFNESS OF UNSPECIFIED SHOULDER, NOT ELSEWHERE CLASSIFIED: ICD-10-CM

## 2023-05-05 DIAGNOSIS — Z98.890 OTHER SPECIFIED POSTPROCEDURAL STATES: Chronic | ICD-10-CM

## 2023-05-05 DIAGNOSIS — Z90.89 ACQUIRED ABSENCE OF OTHER ORGANS: Chronic | ICD-10-CM

## 2023-05-05 DIAGNOSIS — D36.9 BENIGN NEOPLASM, UNSPECIFIED SITE: Chronic | ICD-10-CM

## 2023-05-05 DIAGNOSIS — Z90.49 ACQUIRED ABSENCE OF OTHER SPECIFIED PARTS OF DIGESTIVE TRACT: Chronic | ICD-10-CM

## 2023-05-10 ENCOUNTER — OUTPATIENT (OUTPATIENT)
Dept: OUTPATIENT SERVICES | Facility: HOSPITAL | Age: 63
LOS: 1 days | End: 2023-05-10

## 2023-05-10 DIAGNOSIS — Z90.89 ACQUIRED ABSENCE OF OTHER ORGANS: Chronic | ICD-10-CM

## 2023-05-10 DIAGNOSIS — Z98.891 HISTORY OF UTERINE SCAR FROM PREVIOUS SURGERY: Chronic | ICD-10-CM

## 2023-05-10 DIAGNOSIS — Z98.890 OTHER SPECIFIED POSTPROCEDURAL STATES: Chronic | ICD-10-CM

## 2023-05-10 DIAGNOSIS — M25.619 STIFFNESS OF UNSPECIFIED SHOULDER, NOT ELSEWHERE CLASSIFIED: ICD-10-CM

## 2023-05-10 DIAGNOSIS — D36.9 BENIGN NEOPLASM, UNSPECIFIED SITE: Chronic | ICD-10-CM

## 2023-05-10 DIAGNOSIS — Z90.49 ACQUIRED ABSENCE OF OTHER SPECIFIED PARTS OF DIGESTIVE TRACT: Chronic | ICD-10-CM

## 2023-05-12 ENCOUNTER — OUTPATIENT (OUTPATIENT)
Dept: OUTPATIENT SERVICES | Facility: HOSPITAL | Age: 63
LOS: 1 days | End: 2023-05-12

## 2023-05-12 DIAGNOSIS — D36.9 BENIGN NEOPLASM, UNSPECIFIED SITE: Chronic | ICD-10-CM

## 2023-05-12 DIAGNOSIS — Z90.89 ACQUIRED ABSENCE OF OTHER ORGANS: Chronic | ICD-10-CM

## 2023-05-12 DIAGNOSIS — Z98.891 HISTORY OF UTERINE SCAR FROM PREVIOUS SURGERY: Chronic | ICD-10-CM

## 2023-05-12 DIAGNOSIS — M25.619 STIFFNESS OF UNSPECIFIED SHOULDER, NOT ELSEWHERE CLASSIFIED: ICD-10-CM

## 2023-05-12 DIAGNOSIS — Z98.890 OTHER SPECIFIED POSTPROCEDURAL STATES: Chronic | ICD-10-CM

## 2023-05-12 DIAGNOSIS — Z90.49 ACQUIRED ABSENCE OF OTHER SPECIFIED PARTS OF DIGESTIVE TRACT: Chronic | ICD-10-CM

## 2023-05-17 ENCOUNTER — OUTPATIENT (OUTPATIENT)
Dept: OUTPATIENT SERVICES | Facility: HOSPITAL | Age: 63
LOS: 1 days | End: 2023-05-17

## 2023-05-17 DIAGNOSIS — D36.9 BENIGN NEOPLASM, UNSPECIFIED SITE: Chronic | ICD-10-CM

## 2023-05-17 DIAGNOSIS — Z98.890 OTHER SPECIFIED POSTPROCEDURAL STATES: Chronic | ICD-10-CM

## 2023-05-17 DIAGNOSIS — M25.619 STIFFNESS OF UNSPECIFIED SHOULDER, NOT ELSEWHERE CLASSIFIED: ICD-10-CM

## 2023-05-17 DIAGNOSIS — Z90.89 ACQUIRED ABSENCE OF OTHER ORGANS: Chronic | ICD-10-CM

## 2023-05-17 DIAGNOSIS — Z90.49 ACQUIRED ABSENCE OF OTHER SPECIFIED PARTS OF DIGESTIVE TRACT: Chronic | ICD-10-CM

## 2023-05-17 DIAGNOSIS — Z98.891 HISTORY OF UTERINE SCAR FROM PREVIOUS SURGERY: Chronic | ICD-10-CM

## 2023-05-23 ENCOUNTER — OUTPATIENT (OUTPATIENT)
Dept: OUTPATIENT SERVICES | Facility: HOSPITAL | Age: 63
LOS: 1 days | End: 2023-05-23

## 2023-05-23 DIAGNOSIS — Z98.890 OTHER SPECIFIED POSTPROCEDURAL STATES: Chronic | ICD-10-CM

## 2023-05-23 DIAGNOSIS — Z90.89 ACQUIRED ABSENCE OF OTHER ORGANS: Chronic | ICD-10-CM

## 2023-05-23 DIAGNOSIS — M25.619 STIFFNESS OF UNSPECIFIED SHOULDER, NOT ELSEWHERE CLASSIFIED: ICD-10-CM

## 2023-05-23 DIAGNOSIS — D36.9 BENIGN NEOPLASM, UNSPECIFIED SITE: Chronic | ICD-10-CM

## 2023-05-23 DIAGNOSIS — Z90.49 ACQUIRED ABSENCE OF OTHER SPECIFIED PARTS OF DIGESTIVE TRACT: Chronic | ICD-10-CM

## 2023-05-23 DIAGNOSIS — Z98.891 HISTORY OF UTERINE SCAR FROM PREVIOUS SURGERY: Chronic | ICD-10-CM

## 2023-06-01 ENCOUNTER — OUTPATIENT (OUTPATIENT)
Dept: OUTPATIENT SERVICES | Facility: HOSPITAL | Age: 63
LOS: 1 days | End: 2023-06-01
Payer: MEDICARE

## 2023-06-01 ENCOUNTER — RESULT REVIEW (OUTPATIENT)
Age: 63
End: 2023-06-01

## 2023-06-01 DIAGNOSIS — Z98.891 HISTORY OF UTERINE SCAR FROM PREVIOUS SURGERY: Chronic | ICD-10-CM

## 2023-06-01 DIAGNOSIS — D36.9 BENIGN NEOPLASM, UNSPECIFIED SITE: Chronic | ICD-10-CM

## 2023-06-01 DIAGNOSIS — Z98.890 OTHER SPECIFIED POSTPROCEDURAL STATES: Chronic | ICD-10-CM

## 2023-06-01 DIAGNOSIS — Z90.49 ACQUIRED ABSENCE OF OTHER SPECIFIED PARTS OF DIGESTIVE TRACT: Chronic | ICD-10-CM

## 2023-06-01 DIAGNOSIS — Z90.89 ACQUIRED ABSENCE OF OTHER ORGANS: Chronic | ICD-10-CM

## 2023-06-01 DIAGNOSIS — C50.811 MALIGNANT NEOPLASM OF OVERLAPPING SITES OF RIGHT FEMALE BREAST: ICD-10-CM

## 2023-06-01 PROCEDURE — C8937: CPT

## 2023-06-01 PROCEDURE — 77049 MRI BREAST C-+ W/CAD BI: CPT

## 2023-06-01 PROCEDURE — 77049 MRI BREAST C-+ W/CAD BI: CPT | Mod: 26

## 2023-06-01 PROCEDURE — A9579: CPT

## 2023-06-02 ENCOUNTER — APPOINTMENT (OUTPATIENT)
Dept: PLASTIC SURGERY | Facility: CLINIC | Age: 63
End: 2023-06-02
Payer: MEDICARE

## 2023-06-02 DIAGNOSIS — C50.811 MALIGNANT NEOPLASM OF OVERLAPPING SITES OF RIGHT FEMALE BREAST: ICD-10-CM

## 2023-06-02 PROCEDURE — 99214 OFFICE O/P EST MOD 30 MIN: CPT

## 2023-06-02 NOTE — ASSESSMENT
[FreeTextEntry1] : 63 y/o F with h/o right breast intraductal papilloma s/p R WLE on 1/22/21, now with recent MR detected Stage IIB RIGHT breast invasive mammary carcinoma ([id number], ER+/ME+/Her2+, 7.8 cm superior pole), recommended to undergo skin-sparing mastectomy, which patient has confirmed is her desired oncologic plan.\par \par 12 weeks s/p right breast immediate reconstruction ([facility]) with direct-to-silicone implant (605cc) and Alloderm via SSM approach and concurrent left breast conservative reduction/mastopexy for symmetry. \par Right breast incisional epidermolysis debrided and healing well with LWC. No signs of infection. No exposed implant. \par \par Completely healed right vertical scar\par \par Now s/p radiation, some fat necrosis and tightness on the R side\par left breast with focal fat necrosis at 7:00 periareolar location\par \par Recommend right breast reconstruction revision excision of fat necrosis\par \par -Benefits, risks and alternatives of the procedure were discussed. The risks include but not limited to bleeding, infection, poor wound healing and scarring, possible keloid, and need for re-operation. Location of scar and expected post-surgical outcomes were discussed. The patient understands the risks and would like to proceed with the procedure.\par -Reviewed ongoing XRt related atrophy.\par -continue surgical bra or support bras\par -recommend c/w LE mgmt and recommend compression garment to alleviate chest wall discomfort - possible LE\par - all questions answered\par - Will schedule at patient's earliest convenience\par \par Due to COVID-19, pre-visit patient instructions were explained to the patient and their symptoms were checked upon arrival. Masks were used by the healthcare provider and staff and the examination room was cleaned after the patient visit concluded

## 2023-06-02 NOTE — PHYSICAL EXAM
[de-identified] : well-appearing, NAD [de-identified] : nonlabored breathing [de-identified] : Left breast: soft, all incisions healing well, c/d/i, no erythema or fluid collection, minor superficial T-point wound now completely healed\par Right breast: implant soft, mastectomy flaps well perfused with good cap refill, vertical incision healed; no fluid collection or cellulitis. Small palpable fat necrosis 3 cm and 2 cm lateral to vertical incision. grade 2 CC and XRT-related tissue atrophy/fibrosis [de-identified] : FROM

## 2023-06-02 NOTE — HISTORY OF PRESENT ILLNESS
[FreeTextEntry1] : 63 yo  F /[facility] survivor with PMHx with NIDDM (on metformin), HLD, UC (on mesalamine), hypothyroidism and right breast intraductal papilloma s/p R WLE on 21, now with recent MR detected Stage IIB RIGHT breast invasive mammary carcinoma ([id number], ER+/MO+/Her2+, 7.8 cm superior pole).  Per Breast Surgeon (Dr. Irina Rodriguez), she is not a great candidate for BCS for 7.8 cm cancer and she was offered Right mastectomy. Pt desires mastectomy. She is now referred by her Breast Surgeon - Dr. Irina Rodriguez for discussion of reconstruction options.\par \par No prior breastfeeding history\par Current bra size: 42C\par Desired reconstructive volume: would like to be smaller and lifted\par C/o heavy breasts a/w shoulder pain and brassiere grooving\par \par Non-smoker\par No personal or family h/o DVT and MRSA \par Ht 5'0" Wt 168 lb BMI 32.8%\par Last Hb a1c 7.4% 2021\par Here today with her daughter, who she lives with\par Daughter is an RN\par \par Interval hx (22): Pt presents for follow up for review of FDA checklist and reconstructive volume goals. Would like to be smaller and lifted\par \par Interval hx (22). Patient presents today accompanied by her daughter POD#7 s/p right breast immediate reconstruction ([facility]) with direct-to-silicone implant (605cc) and Alloderm via SSM approach and concurrent left breast conservative reduction/mastopexy for symmetry. Doing well with improving incisional discomfort. Taking all prescribed medications. Denies any f/c or drainage. Drains functional. \par \par Interval hx (22): POD#16 s/p right breast immediate reconstruction ([facility]) with direct-to-silicone implant (605cc) and Alloderm via SSM approach and concurrent left breast conservative reduction/mastopexy for symmetry.   Reports taking tramadol x 2 yesterday for right upper breast. Denies fevers, chills, SOB, CP.  Completed abx on Wed. Right breast drain; 15/13/13/10.\par \par Interval hx (22): Pt presents today POD#23 s/p right breast immediate reconstruction ([facility]) with direct-to-silicone implant (605cc) and Alloderm via SSM approach and concurrent left breast conservative reduction/mastopexy for symmetry. Doing well. Denies any f/c or drainage. \par \par Interval hx (22): Pt presents today POD#29 s/p right breast immediate reconstruction ([facility]) with direct-to-silicone implant (605cc) and Alloderm via SSM approach and concurrent left breast conservative reduction/mastopexy for symmetry. Performing daily LWC. Denies any f/c or drainage. Taking Doxycycline as prescribed. \par \par Interval hx (22): 5 weeks s/p  right breast immediate reconstruction ([facility]) with direct-to-silicone implant (605cc) and Alloderm via SSM approach and concurrent left breast conservative reduction/mastopexy for symmetry.  performing LWC as instructed except applying saline to free gauze..  Denies fevers, chills, swelling.  Here for right breast T-point wound follow up.\par \par Interval hx (22): 7 weeks s/p  right breast immediate reconstruction ([facility]) with direct-to-silicone implant (605cc) and Alloderm via SSM approach and concurrent left breast conservative reduction/mastopexy for symmetry. Performing LWC with NS WTD to right breast wound.  Denies fevers, chills, swelling or purulent drainage. Pleased with healing. \par \par Interval hx (6/10/22): 8 weeks s/p  right breast immediate reconstruction ([facility]) with direct-to-silicone implant (605cc) and Alloderm via SSM approach and concurrent left breast conservative reduction/mastopexy for symmetry. Performing LWC with NS WTD to right breast wound.  Denies fevers, chills, swelling or purulent drainage. Feels the healing is slow for the right breast.  She to undergo 5 weeks of XRT.  No chemotherapy needed.\par \par Interval hx (22) 10 weeks  s/p  right breast immediate reconstruction ([facility]) with direct-to-silicone implant (605cc) and Alloderm via SSM approach and concurrent left breast conservative reduction/mastopexy for symmetry. Performing LWC with NS WTD to right breast wound.  Wearing surgical; daughter feels that it is aiding  wound contracture.  Wound more shallow and smaller. \par \par Interval hx (22):  12 weeks s/p right breast immediate reconstruction ([facility]) with direct-to-silicone implant (605cc) and Alloderm via SSM approach and concurrent left breast conservative reduction/mastopexy for symmetry. Vertical wound of right breast healed after LWC.  No longer drainage on dressing.  applying Aquaphor.   Here for clearance for XRT therapy\par \par Interval hx (22): 3 months s/p right breast immediate reconstruction ([facility]) with direct-to-silicone implant (605cc) and Alloderm via SSM approach and concurrent left breast conservative reduction/mastopexy for symmetry. Continues to feel some area of fat necrosis on the R and some tightness there. Otherwise doing well and no issues. \par \par Interval hx (23): 10 months s/p right breast immediate reconstruction ([facility]) with direct-to-silicone implant (605cc) and Alloderm via SSM approach and concurrent left breast conservative reduction/mastopexy for symmetry. s/p left chest and supraclavicular fossa XRT (completed 22).  Reports developing mild RUE lymphedema, receiving LE therapy with Jessica.  c/o right breast inferior pole fat necrosis and related pain.

## 2023-06-09 ENCOUNTER — OUTPATIENT (OUTPATIENT)
Dept: OUTPATIENT SERVICES | Facility: HOSPITAL | Age: 63
LOS: 1 days | End: 2023-06-09
Payer: MEDICARE

## 2023-06-09 DIAGNOSIS — D36.9 BENIGN NEOPLASM, UNSPECIFIED SITE: Chronic | ICD-10-CM

## 2023-06-09 DIAGNOSIS — Z90.49 ACQUIRED ABSENCE OF OTHER SPECIFIED PARTS OF DIGESTIVE TRACT: Chronic | ICD-10-CM

## 2023-06-09 DIAGNOSIS — M25.619 STIFFNESS OF UNSPECIFIED SHOULDER, NOT ELSEWHERE CLASSIFIED: ICD-10-CM

## 2023-06-09 DIAGNOSIS — Z98.890 OTHER SPECIFIED POSTPROCEDURAL STATES: Chronic | ICD-10-CM

## 2023-06-09 DIAGNOSIS — Z90.89 ACQUIRED ABSENCE OF OTHER ORGANS: Chronic | ICD-10-CM

## 2023-06-09 DIAGNOSIS — Z98.891 HISTORY OF UTERINE SCAR FROM PREVIOUS SURGERY: Chronic | ICD-10-CM

## 2023-06-09 PROCEDURE — 97140 MANUAL THERAPY 1/> REGIONS: CPT | Mod: GO

## 2023-06-09 PROCEDURE — 97110 THERAPEUTIC EXERCISES: CPT | Mod: GO

## 2023-06-21 ENCOUNTER — OUTPATIENT (OUTPATIENT)
Dept: OUTPATIENT SERVICES | Facility: HOSPITAL | Age: 63
LOS: 1 days | End: 2023-06-21

## 2023-06-21 DIAGNOSIS — Z98.890 OTHER SPECIFIED POSTPROCEDURAL STATES: Chronic | ICD-10-CM

## 2023-06-21 DIAGNOSIS — M25.619 STIFFNESS OF UNSPECIFIED SHOULDER, NOT ELSEWHERE CLASSIFIED: ICD-10-CM

## 2023-06-21 DIAGNOSIS — D36.9 BENIGN NEOPLASM, UNSPECIFIED SITE: Chronic | ICD-10-CM

## 2023-06-21 DIAGNOSIS — Z90.89 ACQUIRED ABSENCE OF OTHER ORGANS: Chronic | ICD-10-CM

## 2023-06-21 DIAGNOSIS — Z98.891 HISTORY OF UTERINE SCAR FROM PREVIOUS SURGERY: Chronic | ICD-10-CM

## 2023-06-21 DIAGNOSIS — Z90.49 ACQUIRED ABSENCE OF OTHER SPECIFIED PARTS OF DIGESTIVE TRACT: Chronic | ICD-10-CM

## 2023-06-23 ENCOUNTER — OUTPATIENT (OUTPATIENT)
Dept: OUTPATIENT SERVICES | Facility: HOSPITAL | Age: 63
LOS: 1 days | End: 2023-06-23

## 2023-06-23 ENCOUNTER — NON-APPOINTMENT (OUTPATIENT)
Age: 63
End: 2023-06-23

## 2023-06-23 DIAGNOSIS — Z90.49 ACQUIRED ABSENCE OF OTHER SPECIFIED PARTS OF DIGESTIVE TRACT: Chronic | ICD-10-CM

## 2023-06-23 DIAGNOSIS — M25.619 STIFFNESS OF UNSPECIFIED SHOULDER, NOT ELSEWHERE CLASSIFIED: ICD-10-CM

## 2023-06-23 DIAGNOSIS — Z98.890 OTHER SPECIFIED POSTPROCEDURAL STATES: Chronic | ICD-10-CM

## 2023-06-23 DIAGNOSIS — Z90.89 ACQUIRED ABSENCE OF OTHER ORGANS: Chronic | ICD-10-CM

## 2023-06-23 DIAGNOSIS — Z98.891 HISTORY OF UTERINE SCAR FROM PREVIOUS SURGERY: Chronic | ICD-10-CM

## 2023-06-23 DIAGNOSIS — D36.9 BENIGN NEOPLASM, UNSPECIFIED SITE: Chronic | ICD-10-CM

## 2023-06-28 ENCOUNTER — OUTPATIENT (OUTPATIENT)
Dept: OUTPATIENT SERVICES | Facility: HOSPITAL | Age: 63
LOS: 1 days | End: 2023-06-28

## 2023-06-28 DIAGNOSIS — M25.619 STIFFNESS OF UNSPECIFIED SHOULDER, NOT ELSEWHERE CLASSIFIED: ICD-10-CM

## 2023-06-28 DIAGNOSIS — Z98.890 OTHER SPECIFIED POSTPROCEDURAL STATES: Chronic | ICD-10-CM

## 2023-06-28 DIAGNOSIS — Z98.891 HISTORY OF UTERINE SCAR FROM PREVIOUS SURGERY: Chronic | ICD-10-CM

## 2023-06-28 DIAGNOSIS — Z90.89 ACQUIRED ABSENCE OF OTHER ORGANS: Chronic | ICD-10-CM

## 2023-06-28 DIAGNOSIS — Z90.49 ACQUIRED ABSENCE OF OTHER SPECIFIED PARTS OF DIGESTIVE TRACT: Chronic | ICD-10-CM

## 2023-07-07 ENCOUNTER — OUTPATIENT (OUTPATIENT)
Dept: OUTPATIENT SERVICES | Facility: HOSPITAL | Age: 63
LOS: 1 days | End: 2023-07-07
Payer: MEDICARE

## 2023-07-07 DIAGNOSIS — Z98.890 OTHER SPECIFIED POSTPROCEDURAL STATES: Chronic | ICD-10-CM

## 2023-07-07 DIAGNOSIS — Z90.89 ACQUIRED ABSENCE OF OTHER ORGANS: Chronic | ICD-10-CM

## 2023-07-07 DIAGNOSIS — Z98.891 HISTORY OF UTERINE SCAR FROM PREVIOUS SURGERY: Chronic | ICD-10-CM

## 2023-07-07 DIAGNOSIS — D36.9 BENIGN NEOPLASM, UNSPECIFIED SITE: Chronic | ICD-10-CM

## 2023-07-07 DIAGNOSIS — M25.619 STIFFNESS OF UNSPECIFIED SHOULDER, NOT ELSEWHERE CLASSIFIED: ICD-10-CM

## 2023-07-07 DIAGNOSIS — Z90.49 ACQUIRED ABSENCE OF OTHER SPECIFIED PARTS OF DIGESTIVE TRACT: Chronic | ICD-10-CM

## 2023-07-07 PROCEDURE — 97140 MANUAL THERAPY 1/> REGIONS: CPT | Mod: GO

## 2023-07-07 PROCEDURE — 97110 THERAPEUTIC EXERCISES: CPT | Mod: GO

## 2023-07-08 DIAGNOSIS — M25.619 STIFFNESS OF UNSPECIFIED SHOULDER, NOT ELSEWHERE CLASSIFIED: ICD-10-CM

## 2023-07-12 ENCOUNTER — OUTPATIENT (OUTPATIENT)
Dept: OUTPATIENT SERVICES | Facility: HOSPITAL | Age: 63
LOS: 1 days | End: 2023-07-12

## 2023-07-12 DIAGNOSIS — Z98.891 HISTORY OF UTERINE SCAR FROM PREVIOUS SURGERY: Chronic | ICD-10-CM

## 2023-07-12 DIAGNOSIS — M25.619 STIFFNESS OF UNSPECIFIED SHOULDER, NOT ELSEWHERE CLASSIFIED: ICD-10-CM

## 2023-07-12 DIAGNOSIS — D36.9 BENIGN NEOPLASM, UNSPECIFIED SITE: Chronic | ICD-10-CM

## 2023-07-14 ENCOUNTER — OUTPATIENT (OUTPATIENT)
Dept: OUTPATIENT SERVICES | Facility: HOSPITAL | Age: 63
LOS: 1 days | End: 2023-07-14

## 2023-07-14 DIAGNOSIS — D36.9 BENIGN NEOPLASM, UNSPECIFIED SITE: Chronic | ICD-10-CM

## 2023-07-14 DIAGNOSIS — Z90.49 ACQUIRED ABSENCE OF OTHER SPECIFIED PARTS OF DIGESTIVE TRACT: Chronic | ICD-10-CM

## 2023-07-14 DIAGNOSIS — Z98.891 HISTORY OF UTERINE SCAR FROM PREVIOUS SURGERY: Chronic | ICD-10-CM

## 2023-07-14 DIAGNOSIS — Z98.890 OTHER SPECIFIED POSTPROCEDURAL STATES: Chronic | ICD-10-CM

## 2023-07-14 DIAGNOSIS — M25.619 STIFFNESS OF UNSPECIFIED SHOULDER, NOT ELSEWHERE CLASSIFIED: ICD-10-CM

## 2023-07-14 DIAGNOSIS — Z90.89 ACQUIRED ABSENCE OF OTHER ORGANS: Chronic | ICD-10-CM

## 2023-07-24 ENCOUNTER — APPOINTMENT (OUTPATIENT)
Dept: PLASTIC SURGERY | Facility: CLINIC | Age: 63
End: 2023-07-24

## 2023-08-02 ENCOUNTER — OUTPATIENT (OUTPATIENT)
Dept: OUTPATIENT SERVICES | Facility: HOSPITAL | Age: 63
LOS: 1 days | End: 2023-08-02
Payer: MEDICARE

## 2023-08-02 DIAGNOSIS — Z98.890 OTHER SPECIFIED POSTPROCEDURAL STATES: Chronic | ICD-10-CM

## 2023-08-02 DIAGNOSIS — M25.619 STIFFNESS OF UNSPECIFIED SHOULDER, NOT ELSEWHERE CLASSIFIED: ICD-10-CM

## 2023-08-02 DIAGNOSIS — Z98.891 HISTORY OF UTERINE SCAR FROM PREVIOUS SURGERY: Chronic | ICD-10-CM

## 2023-08-02 DIAGNOSIS — D36.9 BENIGN NEOPLASM, UNSPECIFIED SITE: Chronic | ICD-10-CM

## 2023-08-02 DIAGNOSIS — Z90.89 ACQUIRED ABSENCE OF OTHER ORGANS: Chronic | ICD-10-CM

## 2023-08-02 DIAGNOSIS — Z90.49 ACQUIRED ABSENCE OF OTHER SPECIFIED PARTS OF DIGESTIVE TRACT: Chronic | ICD-10-CM

## 2023-08-02 PROCEDURE — 97140 MANUAL THERAPY 1/> REGIONS: CPT | Mod: GO

## 2023-08-02 PROCEDURE — 97110 THERAPEUTIC EXERCISES: CPT | Mod: GO

## 2023-08-03 DIAGNOSIS — M25.619 STIFFNESS OF UNSPECIFIED SHOULDER, NOT ELSEWHERE CLASSIFIED: ICD-10-CM

## 2023-08-04 ENCOUNTER — OUTPATIENT (OUTPATIENT)
Dept: OUTPATIENT SERVICES | Facility: HOSPITAL | Age: 63
LOS: 1 days | End: 2023-08-04

## 2023-08-04 DIAGNOSIS — M25.619 STIFFNESS OF UNSPECIFIED SHOULDER, NOT ELSEWHERE CLASSIFIED: ICD-10-CM

## 2023-08-04 DIAGNOSIS — Z98.890 OTHER SPECIFIED POSTPROCEDURAL STATES: Chronic | ICD-10-CM

## 2023-08-04 DIAGNOSIS — Z98.891 HISTORY OF UTERINE SCAR FROM PREVIOUS SURGERY: Chronic | ICD-10-CM

## 2023-08-04 DIAGNOSIS — Z90.49 ACQUIRED ABSENCE OF OTHER SPECIFIED PARTS OF DIGESTIVE TRACT: Chronic | ICD-10-CM

## 2023-08-04 DIAGNOSIS — Z90.89 ACQUIRED ABSENCE OF OTHER ORGANS: Chronic | ICD-10-CM

## 2023-08-04 DIAGNOSIS — D36.9 BENIGN NEOPLASM, UNSPECIFIED SITE: Chronic | ICD-10-CM

## 2023-08-09 ENCOUNTER — OUTPATIENT (OUTPATIENT)
Dept: OUTPATIENT SERVICES | Facility: HOSPITAL | Age: 63
LOS: 1 days | End: 2023-08-09

## 2023-08-09 DIAGNOSIS — Z98.890 OTHER SPECIFIED POSTPROCEDURAL STATES: Chronic | ICD-10-CM

## 2023-08-09 DIAGNOSIS — Z90.49 ACQUIRED ABSENCE OF OTHER SPECIFIED PARTS OF DIGESTIVE TRACT: Chronic | ICD-10-CM

## 2023-08-09 DIAGNOSIS — D36.9 BENIGN NEOPLASM, UNSPECIFIED SITE: Chronic | ICD-10-CM

## 2023-08-09 DIAGNOSIS — M25.619 STIFFNESS OF UNSPECIFIED SHOULDER, NOT ELSEWHERE CLASSIFIED: ICD-10-CM

## 2023-08-09 DIAGNOSIS — Z98.891 HISTORY OF UTERINE SCAR FROM PREVIOUS SURGERY: Chronic | ICD-10-CM

## 2023-08-09 DIAGNOSIS — Z90.89 ACQUIRED ABSENCE OF OTHER ORGANS: Chronic | ICD-10-CM

## 2023-08-16 ENCOUNTER — OUTPATIENT (OUTPATIENT)
Dept: OUTPATIENT SERVICES | Facility: HOSPITAL | Age: 63
LOS: 1 days | End: 2023-08-16

## 2023-08-16 DIAGNOSIS — Z98.890 OTHER SPECIFIED POSTPROCEDURAL STATES: Chronic | ICD-10-CM

## 2023-08-16 DIAGNOSIS — M25.619 STIFFNESS OF UNSPECIFIED SHOULDER, NOT ELSEWHERE CLASSIFIED: ICD-10-CM

## 2023-08-16 DIAGNOSIS — D36.9 BENIGN NEOPLASM, UNSPECIFIED SITE: Chronic | ICD-10-CM

## 2023-08-16 DIAGNOSIS — Z98.891 HISTORY OF UTERINE SCAR FROM PREVIOUS SURGERY: Chronic | ICD-10-CM

## 2023-08-16 DIAGNOSIS — Z90.89 ACQUIRED ABSENCE OF OTHER ORGANS: Chronic | ICD-10-CM

## 2023-08-16 DIAGNOSIS — Z90.49 ACQUIRED ABSENCE OF OTHER SPECIFIED PARTS OF DIGESTIVE TRACT: Chronic | ICD-10-CM

## 2023-08-30 ENCOUNTER — OUTPATIENT (OUTPATIENT)
Dept: OUTPATIENT SERVICES | Facility: HOSPITAL | Age: 63
LOS: 1 days | End: 2023-08-30

## 2023-08-30 DIAGNOSIS — D36.9 BENIGN NEOPLASM, UNSPECIFIED SITE: Chronic | ICD-10-CM

## 2023-08-30 DIAGNOSIS — Z98.890 OTHER SPECIFIED POSTPROCEDURAL STATES: Chronic | ICD-10-CM

## 2023-08-30 DIAGNOSIS — Z90.49 ACQUIRED ABSENCE OF OTHER SPECIFIED PARTS OF DIGESTIVE TRACT: Chronic | ICD-10-CM

## 2023-08-30 DIAGNOSIS — M25.619 STIFFNESS OF UNSPECIFIED SHOULDER, NOT ELSEWHERE CLASSIFIED: ICD-10-CM

## 2023-08-30 DIAGNOSIS — Z90.89 ACQUIRED ABSENCE OF OTHER ORGANS: Chronic | ICD-10-CM

## 2023-08-30 DIAGNOSIS — Z98.891 HISTORY OF UTERINE SCAR FROM PREVIOUS SURGERY: Chronic | ICD-10-CM

## 2023-09-01 ENCOUNTER — OUTPATIENT (OUTPATIENT)
Dept: OUTPATIENT SERVICES | Facility: HOSPITAL | Age: 63
LOS: 1 days | End: 2023-09-01
Payer: MEDICARE

## 2023-09-01 DIAGNOSIS — Z90.89 ACQUIRED ABSENCE OF OTHER ORGANS: Chronic | ICD-10-CM

## 2023-09-01 DIAGNOSIS — Z98.891 HISTORY OF UTERINE SCAR FROM PREVIOUS SURGERY: Chronic | ICD-10-CM

## 2023-09-01 DIAGNOSIS — M25.619 STIFFNESS OF UNSPECIFIED SHOULDER, NOT ELSEWHERE CLASSIFIED: ICD-10-CM

## 2023-09-01 DIAGNOSIS — Z90.49 ACQUIRED ABSENCE OF OTHER SPECIFIED PARTS OF DIGESTIVE TRACT: Chronic | ICD-10-CM

## 2023-09-01 DIAGNOSIS — Z98.890 OTHER SPECIFIED POSTPROCEDURAL STATES: Chronic | ICD-10-CM

## 2023-09-01 DIAGNOSIS — D36.9 BENIGN NEOPLASM, UNSPECIFIED SITE: Chronic | ICD-10-CM

## 2023-09-01 PROCEDURE — 97110 THERAPEUTIC EXERCISES: CPT | Mod: GO

## 2023-09-01 PROCEDURE — 97140 MANUAL THERAPY 1/> REGIONS: CPT | Mod: GO

## 2023-09-02 DIAGNOSIS — M25.619 STIFFNESS OF UNSPECIFIED SHOULDER, NOT ELSEWHERE CLASSIFIED: ICD-10-CM

## 2023-09-13 ENCOUNTER — OUTPATIENT (OUTPATIENT)
Dept: OUTPATIENT SERVICES | Facility: HOSPITAL | Age: 63
LOS: 1 days | End: 2023-09-13
Payer: MEDICARE

## 2023-09-13 ENCOUNTER — APPOINTMENT (OUTPATIENT)
Dept: HEMATOLOGY ONCOLOGY | Facility: CLINIC | Age: 63
End: 2023-09-13

## 2023-09-13 ENCOUNTER — APPOINTMENT (OUTPATIENT)
Dept: HEMATOLOGY ONCOLOGY | Facility: CLINIC | Age: 63
End: 2023-09-13
Payer: MEDICARE

## 2023-09-13 ENCOUNTER — OUTPATIENT (OUTPATIENT)
Dept: OUTPATIENT SERVICES | Facility: HOSPITAL | Age: 63
LOS: 1 days | End: 2023-09-13

## 2023-09-13 VITALS
TEMPERATURE: 97.2 F | BODY MASS INDEX: 31.02 KG/M2 | DIASTOLIC BLOOD PRESSURE: 78 MMHG | SYSTOLIC BLOOD PRESSURE: 112 MMHG | HEART RATE: 101 BPM | WEIGHT: 158 LBS | HEIGHT: 60 IN

## 2023-09-13 DIAGNOSIS — M25.619 STIFFNESS OF UNSPECIFIED SHOULDER, NOT ELSEWHERE CLASSIFIED: ICD-10-CM

## 2023-09-13 DIAGNOSIS — Z98.890 OTHER SPECIFIED POSTPROCEDURAL STATES: Chronic | ICD-10-CM

## 2023-09-13 DIAGNOSIS — Z90.49 ACQUIRED ABSENCE OF OTHER SPECIFIED PARTS OF DIGESTIVE TRACT: Chronic | ICD-10-CM

## 2023-09-13 DIAGNOSIS — Z98.891 HISTORY OF UTERINE SCAR FROM PREVIOUS SURGERY: Chronic | ICD-10-CM

## 2023-09-13 DIAGNOSIS — Z90.11 ACQUIRED ABSENCE OF RIGHT BREAST AND NIPPLE: ICD-10-CM

## 2023-09-13 DIAGNOSIS — D36.9 BENIGN NEOPLASM, UNSPECIFIED SITE: Chronic | ICD-10-CM

## 2023-09-13 PROCEDURE — 99213 OFFICE O/P EST LOW 20 MIN: CPT

## 2023-09-13 PROCEDURE — 99213 OFFICE O/P EST LOW 20 MIN: CPT | Mod: 1L

## 2023-09-14 DIAGNOSIS — Z90.11 ACQUIRED ABSENCE OF RIGHT BREAST AND NIPPLE: ICD-10-CM

## 2023-09-20 ENCOUNTER — OUTPATIENT (OUTPATIENT)
Dept: OUTPATIENT SERVICES | Facility: HOSPITAL | Age: 63
LOS: 1 days | End: 2023-09-20

## 2023-09-20 DIAGNOSIS — M25.619 STIFFNESS OF UNSPECIFIED SHOULDER, NOT ELSEWHERE CLASSIFIED: ICD-10-CM

## 2023-09-20 DIAGNOSIS — Z90.89 ACQUIRED ABSENCE OF OTHER ORGANS: Chronic | ICD-10-CM

## 2023-09-20 DIAGNOSIS — Z98.890 OTHER SPECIFIED POSTPROCEDURAL STATES: Chronic | ICD-10-CM

## 2023-09-20 DIAGNOSIS — Z98.891 HISTORY OF UTERINE SCAR FROM PREVIOUS SURGERY: Chronic | ICD-10-CM

## 2023-09-20 DIAGNOSIS — Z90.49 ACQUIRED ABSENCE OF OTHER SPECIFIED PARTS OF DIGESTIVE TRACT: Chronic | ICD-10-CM

## 2023-09-20 DIAGNOSIS — D36.9 BENIGN NEOPLASM, UNSPECIFIED SITE: Chronic | ICD-10-CM

## 2023-09-21 ENCOUNTER — OUTPATIENT (OUTPATIENT)
Dept: OUTPATIENT SERVICES | Facility: HOSPITAL | Age: 63
LOS: 1 days | End: 2023-09-21
Payer: MEDICARE

## 2023-09-21 ENCOUNTER — APPOINTMENT (OUTPATIENT)
Dept: RADIATION ONCOLOGY | Facility: HOSPITAL | Age: 63
End: 2023-09-21
Payer: MEDICARE

## 2023-09-21 VITALS
HEIGHT: 60 IN | OXYGEN SATURATION: 98 % | HEART RATE: 91 BPM | TEMPERATURE: 98 F | DIASTOLIC BLOOD PRESSURE: 65 MMHG | WEIGHT: 157.41 LBS | SYSTOLIC BLOOD PRESSURE: 109 MMHG | RESPIRATION RATE: 16 BRPM

## 2023-09-21 VITALS
SYSTOLIC BLOOD PRESSURE: 111 MMHG | WEIGHT: 159.25 LBS | RESPIRATION RATE: 16 BRPM | BODY MASS INDEX: 31.1 KG/M2 | DIASTOLIC BLOOD PRESSURE: 70 MMHG | OXYGEN SATURATION: 95 % | HEART RATE: 99 BPM | TEMPERATURE: 98.4 F

## 2023-09-21 DIAGNOSIS — Z98.890 OTHER SPECIFIED POSTPROCEDURAL STATES: Chronic | ICD-10-CM

## 2023-09-21 DIAGNOSIS — C50.811 MALIGNANT NEOPLASM OF OVERLAPPING SITES OF RIGHT FEMALE BREAST: ICD-10-CM

## 2023-09-21 DIAGNOSIS — Z98.891 HISTORY OF UTERINE SCAR FROM PREVIOUS SURGERY: Chronic | ICD-10-CM

## 2023-09-21 DIAGNOSIS — D36.9 BENIGN NEOPLASM, UNSPECIFIED SITE: Chronic | ICD-10-CM

## 2023-09-21 DIAGNOSIS — Z90.49 ACQUIRED ABSENCE OF OTHER SPECIFIED PARTS OF DIGESTIVE TRACT: Chronic | ICD-10-CM

## 2023-09-21 DIAGNOSIS — Z92.3 PERSONAL HISTORY OF IRRADIATION: ICD-10-CM

## 2023-09-21 DIAGNOSIS — Z90.89 ACQUIRED ABSENCE OF OTHER ORGANS: Chronic | ICD-10-CM

## 2023-09-21 DIAGNOSIS — Z90.11 ACQUIRED ABSENCE OF RIGHT BREAST AND NIPPLE: Chronic | ICD-10-CM

## 2023-09-21 DIAGNOSIS — Z85.3 PERSONAL HISTORY OF MALIGNANT NEOPLASM OF BREAST: ICD-10-CM

## 2023-09-21 DIAGNOSIS — Z01.818 ENCOUNTER FOR OTHER PREPROCEDURAL EXAMINATION: ICD-10-CM

## 2023-09-21 LAB
A1C WITH ESTIMATED AVERAGE GLUCOSE RESULT: 6.8 % — HIGH (ref 4–5.6)
ALBUMIN SERPL ELPH-MCNC: 4.7 G/DL — SIGNIFICANT CHANGE UP (ref 3.5–5.2)
ALP SERPL-CCNC: 79 U/L — SIGNIFICANT CHANGE UP (ref 30–115)
ALT FLD-CCNC: 20 U/L — SIGNIFICANT CHANGE UP (ref 0–41)
ANION GAP SERPL CALC-SCNC: 12 MMOL/L — SIGNIFICANT CHANGE UP (ref 7–14)
APTT BLD: 27.1 SEC — SIGNIFICANT CHANGE UP (ref 27–39.2)
AST SERPL-CCNC: 17 U/L — SIGNIFICANT CHANGE UP (ref 0–41)
BASOPHILS # BLD AUTO: 0.05 K/UL — SIGNIFICANT CHANGE UP (ref 0–0.2)
BASOPHILS NFR BLD AUTO: 0.8 % — SIGNIFICANT CHANGE UP (ref 0–1)
BILIRUB SERPL-MCNC: 0.3 MG/DL — SIGNIFICANT CHANGE UP (ref 0.2–1.2)
BUN SERPL-MCNC: 21 MG/DL — HIGH (ref 10–20)
CALCIUM SERPL-MCNC: 9.9 MG/DL — SIGNIFICANT CHANGE UP (ref 8.4–10.5)
CHLORIDE SERPL-SCNC: 102 MMOL/L — SIGNIFICANT CHANGE UP (ref 98–110)
CO2 SERPL-SCNC: 27 MMOL/L — SIGNIFICANT CHANGE UP (ref 17–32)
CREAT SERPL-MCNC: 0.9 MG/DL — SIGNIFICANT CHANGE UP (ref 0.7–1.5)
EGFR: 72 ML/MIN/1.73M2 — SIGNIFICANT CHANGE UP
EOSINOPHIL # BLD AUTO: 0.13 K/UL — SIGNIFICANT CHANGE UP (ref 0–0.7)
EOSINOPHIL NFR BLD AUTO: 2 % — SIGNIFICANT CHANGE UP (ref 0–8)
ESTIMATED AVERAGE GLUCOSE: 148 MG/DL — HIGH (ref 68–114)
GLUCOSE SERPL-MCNC: 115 MG/DL — HIGH (ref 70–99)
HCT VFR BLD CALC: 38.9 % — SIGNIFICANT CHANGE UP (ref 37–47)
HGB BLD-MCNC: 12.6 G/DL — SIGNIFICANT CHANGE UP (ref 12–16)
IMM GRANULOCYTES NFR BLD AUTO: 0.2 % — SIGNIFICANT CHANGE UP (ref 0.1–0.3)
INR BLD: 1.02 RATIO — SIGNIFICANT CHANGE UP (ref 0.65–1.3)
LYMPHOCYTES # BLD AUTO: 1.85 K/UL — SIGNIFICANT CHANGE UP (ref 1.2–3.4)
LYMPHOCYTES # BLD AUTO: 28.6 % — SIGNIFICANT CHANGE UP (ref 20.5–51.1)
MCHC RBC-ENTMCNC: 28.4 PG — SIGNIFICANT CHANGE UP (ref 27–31)
MCHC RBC-ENTMCNC: 32.4 G/DL — SIGNIFICANT CHANGE UP (ref 32–37)
MCV RBC AUTO: 87.8 FL — SIGNIFICANT CHANGE UP (ref 81–99)
MONOCYTES # BLD AUTO: 0.69 K/UL — HIGH (ref 0.1–0.6)
MONOCYTES NFR BLD AUTO: 10.7 % — HIGH (ref 1.7–9.3)
NEUTROPHILS # BLD AUTO: 3.73 K/UL — SIGNIFICANT CHANGE UP (ref 1.4–6.5)
NEUTROPHILS NFR BLD AUTO: 57.7 % — SIGNIFICANT CHANGE UP (ref 42.2–75.2)
NRBC # BLD: 0 /100 WBCS — SIGNIFICANT CHANGE UP (ref 0–0)
PLATELET # BLD AUTO: 344 K/UL — SIGNIFICANT CHANGE UP (ref 130–400)
PMV BLD: 10.2 FL — SIGNIFICANT CHANGE UP (ref 7.4–10.4)
POTASSIUM SERPL-MCNC: 4.3 MMOL/L — SIGNIFICANT CHANGE UP (ref 3.5–5)
POTASSIUM SERPL-SCNC: 4.3 MMOL/L — SIGNIFICANT CHANGE UP (ref 3.5–5)
PROT SERPL-MCNC: 7.3 G/DL — SIGNIFICANT CHANGE UP (ref 6–8)
PROTHROM AB SERPL-ACNC: 11.6 SEC — SIGNIFICANT CHANGE UP (ref 9.95–12.87)
RBC # BLD: 4.43 M/UL — SIGNIFICANT CHANGE UP (ref 4.2–5.4)
RBC # FLD: 13.3 % — SIGNIFICANT CHANGE UP (ref 11.5–14.5)
SODIUM SERPL-SCNC: 141 MMOL/L — SIGNIFICANT CHANGE UP (ref 135–146)
WBC # BLD: 6.46 K/UL — SIGNIFICANT CHANGE UP (ref 4.8–10.8)
WBC # FLD AUTO: 6.46 K/UL — SIGNIFICANT CHANGE UP (ref 4.8–10.8)

## 2023-09-21 PROCEDURE — 99213 OFFICE O/P EST LOW 20 MIN: CPT

## 2023-09-21 PROCEDURE — 36415 COLL VENOUS BLD VENIPUNCTURE: CPT

## 2023-09-21 PROCEDURE — 99214 OFFICE O/P EST MOD 30 MIN: CPT | Mod: 25

## 2023-09-21 PROCEDURE — 83036 HEMOGLOBIN GLYCOSYLATED A1C: CPT

## 2023-09-21 PROCEDURE — 85730 THROMBOPLASTIN TIME PARTIAL: CPT

## 2023-09-21 PROCEDURE — 93010 ELECTROCARDIOGRAM REPORT: CPT

## 2023-09-21 PROCEDURE — 99213 OFFICE O/P EST LOW 20 MIN: CPT | Mod: TC

## 2023-09-21 PROCEDURE — 80053 COMPREHEN METABOLIC PANEL: CPT

## 2023-09-21 PROCEDURE — 93005 ELECTROCARDIOGRAM TRACING: CPT

## 2023-09-21 PROCEDURE — 85610 PROTHROMBIN TIME: CPT

## 2023-09-21 PROCEDURE — 85025 COMPLETE CBC W/AUTO DIFF WBC: CPT

## 2023-09-21 RX ORDER — METHOCARBAMOL 750 MG/1
750 TABLET, FILM COATED ORAL
Qty: 45 | Refills: 0 | Status: DISCONTINUED | COMMUNITY
Start: 2021-12-13 | End: 2023-09-21

## 2023-09-21 RX ORDER — TIRZEPATIDE 7.5 MG/.5ML
7.5 INJECTION, SOLUTION SUBCUTANEOUS
Refills: 0 | Status: ACTIVE | COMMUNITY

## 2023-09-21 RX ORDER — MULTIVIT-MIN/FERROUS GLUCONATE 9 MG/15 ML
1 LIQUID (ML) ORAL
Qty: 0 | Refills: 0 | DISCHARGE

## 2023-09-21 RX ORDER — GLIMEPIRIDE 1 MG
1 TABLET ORAL
Qty: 0 | Refills: 0 | DISCHARGE

## 2023-09-21 RX ORDER — VITAMIN E (DL,TOCOPHERYL ACET) 180 MG
CAPSULE ORAL
Refills: 0 | Status: ACTIVE | COMMUNITY

## 2023-09-21 RX ORDER — SACCHAROMYCES BOULARDII 50 MG
CAPSULE ORAL
Refills: 0 | Status: ACTIVE | COMMUNITY

## 2023-09-21 RX ORDER — ASPIRIN/CALCIUM CARB/MAGNESIUM 324 MG
0 TABLET ORAL
Qty: 0 | Refills: 0 | DISCHARGE

## 2023-09-21 RX ORDER — LANOLIN ALCOHOL/MO/W.PET/CERES
500 CREAM (GRAM) TOPICAL
Refills: 0 | Status: ACTIVE | COMMUNITY

## 2023-09-21 NOTE — H&P PST ADULT - CONSTITUTIONAL
Patient is requesting refill on   ClonazePAM 0.5 mg  Will run out in 3 days.
Please review. Protocol failed / No protocol. Requested Prescriptions   Pending Prescriptions Disp Refills    clonazePAM 0.5 MG Oral Tab 30 tablet 2     Sig: Take 1 tablet (0.5 mg total) by mouth daily.         There is no refill protocol information fo
normal/well-groomed/no distress

## 2023-09-21 NOTE — H&P PST ADULT - REASON FOR ADMISSION
64 y/o female presents to PAST in preparation for right breast reconstruction revision in [facility] under LSB with Dr. Brambila on 10/5/23

## 2023-09-21 NOTE — H&P PST ADULT - NSICDXPASTMEDICALHX_GEN_ALL_CORE_FT
PAST MEDICAL HISTORY:  Acid reflux     Breast cancer RIGHT    Diabetes     High cholesterol     Hypothyroid     Lymphedema     Ulcerative colitis

## 2023-09-21 NOTE — H&P PST ADULT - NSICDXPASTSURGICALHX_GEN_ALL_CORE_FT
PAST SURGICAL HISTORY:  H/O breast biopsy     H/O  section     H/O dilation and curettage     H/O right mastectomy     History of appendectomy     History of tonsillectomy     Intraductal papilloma

## 2023-09-21 NOTE — H&P PST ADULT - HISTORY OF PRESENT ILLNESS
62 y/o female presents to PAST in preparation for right breast reconstruction revision in [facility] under LSB with Dr. Brambila on 10/5/23    Pt with PMHx of  right breast intraductal papilloma s/p R WLE on 21, MR detected Stage IIB RIGHT breast invasive mammary carcinoma ([id number], ER+/OK+/Her2+, 7.8 cm superior pole). Pt had sx to her right breast in . Pt had completed radiation tx and noted changes to her breast following treatment. Pt now for reconstruction sx.    Pt with no significant respiratory history, denies any sob, lungs cta b/l, O2 sat at 98%. No CXR indicated upon exam.   PATIENT CURRENTLY DENIES CHEST PAIN  SHORTNESS OF BREATH  PALPITATIONS,  DYSURIA, OR UPPER RESPIRATORY INFECTION IN PAST 2 WEEKS  Patient verbalized understanding of instructions and was given the opportunity to ask questions and have them answered.  As per patient, this is their complete medical and surgical history, including medications both prescribed or over the counter.  written and verbal instructions with teach back on chlorhexidine shampoo provided,  pt verbalized understanding with returned demonstration    Anesthesia Alert  NO--Difficult Airway  NO--History of neck surgery or radiation- Right breast radiation completed   NO--Limited ROM of neck  NO--History of Malignant hyperthermia  NO--Personal or family history of Pseudocholinesterase deficiency.  NO--Prior Anesthesia Complication  NO--Latex Allergy  NO--Loose teeth  NO--History of Rheumatoid Arthritis  NO--NEY  NO--Bleeding risk  NO--Other_____  Mallampati airway: Class III  Right arm precautions       Aaron Activity Status Index (DASI)  2023      RESULT SUMMARY:  38.2 points  The higher the score (maximum 58.2), the higher the functional status.    7.44 METs    INPUTS:  Take care of self —> 2.75 = Yes  Walk indoors —> 1.75 = Yes  Walk 1&ndash;2 blocks on level ground —> 2.75 = Yes  Climb a flight of stairs or walk up a hill —> 5.5 = Yes  Run a short distance —> 8 = Yes  Do light work around the house —> 2.7 = Yes  Do moderate work around the house —> 3.5 = Yes  Do heavy work around the house —> 0 = No  Do yardwork —> 0 = No  Have sexual relations —> 5.25 = Yes  Participate in moderate recreational activities —> 6 = Yes  Participate in strenuous sports —> 0 = No    Revised Cardiac Risk Index for Pre-Operative Risk  2023    RESULT SUMMARY:  0 points  Class I Risk    3.9 %  30-day risk of death, MI, or cardiac arrest      INPUTS:  Elevated-risk surgery —> 0 = No  History of ischemic heart disease —> 0 = No  History of congestive heart failure —> 0 = No  History of cerebrovascular disease —> 0 = No  Pre-operative treatment with insulin —> 0 = No  Pre-operative creatinine >2 mg/dL / 176.8 µmol/L —> 0 = No    History of malignant neoplasm of breast    Encounter for other preprocedural examination    FH: CAD (coronary artery disease)    High cholesterol    Diabetes    Acid reflux    Hypothyroid    Ulcerative colitis    Breast cancer    History of tonsillectomy    History of appendectomy    H/O dilation and curettage    Intraductal papilloma    H/O  section    H/O breast biopsy    [id number]    [id number]

## 2023-09-22 ENCOUNTER — OUTPATIENT (OUTPATIENT)
Dept: OUTPATIENT SERVICES | Facility: HOSPITAL | Age: 63
LOS: 1 days | End: 2023-09-22

## 2023-09-22 DIAGNOSIS — Z98.891 HISTORY OF UTERINE SCAR FROM PREVIOUS SURGERY: Chronic | ICD-10-CM

## 2023-09-22 DIAGNOSIS — Z85.3 PERSONAL HISTORY OF MALIGNANT NEOPLASM OF BREAST: ICD-10-CM

## 2023-09-22 DIAGNOSIS — Z90.11 ACQUIRED ABSENCE OF RIGHT BREAST AND NIPPLE: Chronic | ICD-10-CM

## 2023-09-22 DIAGNOSIS — D36.9 BENIGN NEOPLASM, UNSPECIFIED SITE: Chronic | ICD-10-CM

## 2023-09-22 DIAGNOSIS — Z90.49 ACQUIRED ABSENCE OF OTHER SPECIFIED PARTS OF DIGESTIVE TRACT: Chronic | ICD-10-CM

## 2023-09-22 DIAGNOSIS — Z98.890 OTHER SPECIFIED POSTPROCEDURAL STATES: Chronic | ICD-10-CM

## 2023-09-22 DIAGNOSIS — Z01.818 ENCOUNTER FOR OTHER PREPROCEDURAL EXAMINATION: ICD-10-CM

## 2023-09-22 DIAGNOSIS — M25.619 STIFFNESS OF UNSPECIFIED SHOULDER, NOT ELSEWHERE CLASSIFIED: ICD-10-CM

## 2023-09-22 DIAGNOSIS — Z90.89 ACQUIRED ABSENCE OF OTHER ORGANS: Chronic | ICD-10-CM

## 2023-09-22 DIAGNOSIS — C50.811 MALIGNANT NEOPLASM OF OVERLAPPING SITES OF RIGHT FEMALE BREAST: ICD-10-CM

## 2023-09-25 RX ORDER — GABAPENTIN 300 MG/1
300 CAPSULE ORAL
Qty: 7 | Refills: 0 | Status: ACTIVE | COMMUNITY
Start: 2023-09-25 | End: 1900-01-01

## 2023-10-04 NOTE — ASU PATIENT PROFILE, ADULT - FALL HARM RISK - UNIVERSAL INTERVENTIONS
Bed in lowest position, wheels locked, appropriate side rails in place/Call bell, personal items and telephone in reach/Instruct patient to call for assistance before getting out of bed or chair/Non-slip footwear when patient is out of bed/[place] to call system/Physically safe environment - no spills, clutter or unnecessary equipment/Purposeful Proactive Rounding/Room/bathroom lighting operational, light cord in reach

## 2023-10-05 ENCOUNTER — TRANSCRIPTION ENCOUNTER (OUTPATIENT)
Age: 63
End: 2023-10-05

## 2023-10-05 ENCOUNTER — RESULT REVIEW (OUTPATIENT)
Age: 63
End: 2023-10-05

## 2023-10-05 ENCOUNTER — APPOINTMENT (OUTPATIENT)
Dept: PLASTIC SURGERY | Facility: AMBULATORY SURGERY CENTER | Age: 63
End: 2023-10-05
Payer: MEDICARE

## 2023-10-05 ENCOUNTER — OUTPATIENT (OUTPATIENT)
Dept: OUTPATIENT SERVICES | Facility: HOSPITAL | Age: 63
LOS: 1 days | Discharge: HOME | End: 2023-10-05
Payer: MEDICARE

## 2023-10-05 VITALS
SYSTOLIC BLOOD PRESSURE: 130 MMHG | OXYGEN SATURATION: 97 % | HEIGHT: 60 IN | DIASTOLIC BLOOD PRESSURE: 79 MMHG | RESPIRATION RATE: 18 BRPM | TEMPERATURE: 98 F | HEART RATE: 89 BPM | WEIGHT: 157.41 LBS

## 2023-10-05 VITALS
DIASTOLIC BLOOD PRESSURE: 64 MMHG | HEART RATE: 80 BPM | SYSTOLIC BLOOD PRESSURE: 110 MMHG | OXYGEN SATURATION: 98 % | RESPIRATION RATE: 20 BRPM

## 2023-10-05 DIAGNOSIS — Z90.11 ACQUIRED ABSENCE OF RIGHT BREAST AND NIPPLE: Chronic | ICD-10-CM

## 2023-10-05 DIAGNOSIS — Z98.891 HISTORY OF UTERINE SCAR FROM PREVIOUS SURGERY: Chronic | ICD-10-CM

## 2023-10-05 DIAGNOSIS — Z90.89 ACQUIRED ABSENCE OF OTHER ORGANS: Chronic | ICD-10-CM

## 2023-10-05 DIAGNOSIS — D36.9 BENIGN NEOPLASM, UNSPECIFIED SITE: Chronic | ICD-10-CM

## 2023-10-05 DIAGNOSIS — Z85.3 PERSONAL HISTORY OF MALIGNANT NEOPLASM OF BREAST: ICD-10-CM

## 2023-10-05 DIAGNOSIS — Z98.890 OTHER SPECIFIED POSTPROCEDURAL STATES: Chronic | ICD-10-CM

## 2023-10-05 DIAGNOSIS — Z90.49 ACQUIRED ABSENCE OF OTHER SPECIFIED PARTS OF DIGESTIVE TRACT: Chronic | ICD-10-CM

## 2023-10-05 LAB — GLUCOSE BLDC GLUCOMTR-MCNC: 122 MG/DL — HIGH (ref 70–99)

## 2023-10-05 PROCEDURE — 88342 IMHCHEM/IMCYTCHM 1ST ANTB: CPT

## 2023-10-05 PROCEDURE — 82962 GLUCOSE BLOOD TEST: CPT

## 2023-10-05 PROCEDURE — 88341 IMHCHEM/IMCYTCHM EA ADD ANTB: CPT | Mod: 26

## 2023-10-05 PROCEDURE — 88304 TISSUE EXAM BY PATHOLOGIST: CPT | Mod: 26

## 2023-10-05 PROCEDURE — 19380 REVJ RECONSTRUCTED BREAST: CPT | Mod: RT

## 2023-10-05 PROCEDURE — 88304 TISSUE EXAM BY PATHOLOGIST: CPT

## 2023-10-05 PROCEDURE — 88341 IMHCHEM/IMCYTCHM EA ADD ANTB: CPT

## 2023-10-05 PROCEDURE — 88342 IMHCHEM/IMCYTCHM 1ST ANTB: CPT | Mod: 26

## 2023-10-05 RX ORDER — PANTOPRAZOLE SODIUM 20 MG/1
1 TABLET, DELAYED RELEASE ORAL
Qty: 0 | Refills: 0 | DISCHARGE

## 2023-10-05 RX ORDER — GABAPENTIN 400 MG/1
0 CAPSULE ORAL
Refills: 0 | DISCHARGE

## 2023-10-05 RX ORDER — PREGABALIN 225 MG/1
1 CAPSULE ORAL
Qty: 0 | Refills: 0 | DISCHARGE

## 2023-10-05 RX ORDER — SODIUM CHLORIDE 9 MG/ML
1000 INJECTION, SOLUTION INTRAVENOUS
Refills: 0 | Status: DISCONTINUED | OUTPATIENT
Start: 2023-10-05 | End: 2023-10-05

## 2023-10-05 RX ORDER — LEVOTHYROXINE SODIUM 125 MCG
1 TABLET ORAL
Qty: 0 | Refills: 0 | DISCHARGE

## 2023-10-05 RX ORDER — ACETAMINOPHEN 500 MG
2 TABLET ORAL
Refills: 0 | DISCHARGE

## 2023-10-05 RX ORDER — LOSARTAN POTASSIUM 100 MG/1
1 TABLET, FILM COATED ORAL
Qty: 0 | Refills: 0 | DISCHARGE

## 2023-10-05 RX ORDER — ZOLPIDEM TARTRATE 10 MG/1
1 TABLET ORAL
Qty: 0 | Refills: 0 | DISCHARGE

## 2023-10-05 RX ORDER — METFORMIN HYDROCHLORIDE 850 MG/1
2 TABLET ORAL
Qty: 0 | Refills: 0 | DISCHARGE

## 2023-10-05 RX ORDER — ATORVASTATIN CALCIUM 80 MG/1
1 TABLET, FILM COATED ORAL
Qty: 0 | Refills: 0 | DISCHARGE

## 2023-10-05 RX ORDER — ONDANSETRON 8 MG/1
4 TABLET, FILM COATED ORAL ONCE
Refills: 0 | Status: DISCONTINUED | OUTPATIENT
Start: 2023-10-05 | End: 2023-10-05

## 2023-10-05 RX ORDER — OXYCODONE HYDROCHLORIDE 5 MG/1
5 TABLET ORAL ONCE
Refills: 0 | Status: DISCONTINUED | OUTPATIENT
Start: 2023-10-05 | End: 2023-10-05

## 2023-10-05 RX ORDER — FAMOTIDINE 10 MG/ML
1 INJECTION INTRAVENOUS
Qty: 0 | Refills: 0 | DISCHARGE

## 2023-10-05 RX ORDER — MORPHINE SULFATE 50 MG/1
2 CAPSULE, EXTENDED RELEASE ORAL
Refills: 0 | Status: DISCONTINUED | OUTPATIENT
Start: 2023-10-05 | End: 2023-10-05

## 2023-10-05 RX ORDER — SACCHAROMYCES BOULARDII 250 MG
0 POWDER IN PACKET (EA) ORAL
Qty: 0 | Refills: 0 | DISCHARGE

## 2023-10-05 RX ORDER — TIRZEPATIDE 15 MG/.5ML
7.5 INJECTION, SOLUTION SUBCUTANEOUS
Refills: 0 | DISCHARGE

## 2023-10-05 RX ADMIN — SODIUM CHLORIDE 100 MILLILITER(S): 9 INJECTION, SOLUTION INTRAVENOUS at 08:38

## 2023-10-05 NOTE — ASU DISCHARGE PLAN (ADULT/PEDIATRIC) - CARE PROVIDER_API CALL
Shari Brambila  Plastic Surgery  [address] [place] [address], [address]  [place] [address]  Phone: [phone]  Fax: [phone]  Established Patient  Follow Up Time: 1 week

## 2023-10-05 NOTE — ASU DISCHARGE PLAN (ADULT/PEDIATRIC) - ASU DC SPECIAL INSTRUCTIONSFT
Please follow up with Dr. Brambila. Please call her office at the number provided within 48 hours of leaving the hospital to set up this appointment.     Pain meds:   - Gabapentin twice daily  - Extra strength tylenol routinely. Please do not exceed 4,000mg in one day.     Do not sleep on your right side     Keep your compression bra on     ACTIVITY: Please refrain from increased physical activity. Please do not lift anything heavier than a gallon of milk or about 5 pounds.     ANTIBIOTICS: Please take any prescribed antibiotics as directed. These will be sent to your designated pharmacy    Dressing: please leave dressing in place until follow up. Please keep the dressing clean and dry while you recover.

## 2023-10-10 LAB — SURGICAL PATHOLOGY STUDY: SIGNIFICANT CHANGE UP

## 2023-10-11 DIAGNOSIS — N64.1 FAT NECROSIS OF BREAST: ICD-10-CM

## 2023-10-11 DIAGNOSIS — K21.9 GASTRO-ESOPHAGEAL REFLUX DISEASE WITHOUT ESOPHAGITIS: ICD-10-CM

## 2023-10-11 DIAGNOSIS — E03.9 HYPOTHYROIDISM, UNSPECIFIED: ICD-10-CM

## 2023-10-11 DIAGNOSIS — E11.9 TYPE 2 DIABETES MELLITUS WITHOUT COMPLICATIONS: ICD-10-CM

## 2023-10-11 DIAGNOSIS — E78.00 PURE HYPERCHOLESTEROLEMIA, UNSPECIFIED: ICD-10-CM

## 2023-10-11 DIAGNOSIS — Z85.3 PERSONAL HISTORY OF MALIGNANT NEOPLASM OF BREAST: ICD-10-CM

## 2023-10-12 ENCOUNTER — APPOINTMENT (OUTPATIENT)
Dept: PLASTIC SURGERY | Facility: CLINIC | Age: 63
End: 2023-10-12
Payer: MEDICARE

## 2023-10-12 DIAGNOSIS — Z98.890 OTHER SPECIFIED POSTPROCEDURAL STATES: ICD-10-CM

## 2023-10-12 PROCEDURE — 99024 POSTOP FOLLOW-UP VISIT: CPT

## 2023-11-13 ENCOUNTER — APPOINTMENT (OUTPATIENT)
Dept: PLASTIC SURGERY | Facility: CLINIC | Age: 63
End: 2023-11-13
Payer: MEDICARE

## 2023-11-13 PROBLEM — I89.0 LYMPHEDEMA, NOT ELSEWHERE CLASSIFIED: Chronic | Status: ACTIVE | Noted: 2023-09-21

## 2023-11-13 PROCEDURE — 99024 POSTOP FOLLOW-UP VISIT: CPT

## 2023-11-21 ENCOUNTER — RESULT REVIEW (OUTPATIENT)
Age: 63
End: 2023-11-21

## 2023-11-21 ENCOUNTER — OUTPATIENT (OUTPATIENT)
Dept: OUTPATIENT SERVICES | Facility: HOSPITAL | Age: 63
LOS: 1 days | End: 2023-11-21
Payer: MEDICARE

## 2023-11-21 DIAGNOSIS — Z90.89 ACQUIRED ABSENCE OF OTHER ORGANS: Chronic | ICD-10-CM

## 2023-11-21 DIAGNOSIS — Z98.891 HISTORY OF UTERINE SCAR FROM PREVIOUS SURGERY: Chronic | ICD-10-CM

## 2023-11-21 DIAGNOSIS — Z90.49 ACQUIRED ABSENCE OF OTHER SPECIFIED PARTS OF DIGESTIVE TRACT: Chronic | ICD-10-CM

## 2023-11-21 DIAGNOSIS — Z98.890 OTHER SPECIFIED POSTPROCEDURAL STATES: Chronic | ICD-10-CM

## 2023-11-21 DIAGNOSIS — D36.9 BENIGN NEOPLASM, UNSPECIFIED SITE: Chronic | ICD-10-CM

## 2023-11-21 DIAGNOSIS — Z90.11 ACQUIRED ABSENCE OF RIGHT BREAST AND NIPPLE: Chronic | ICD-10-CM

## 2023-11-21 DIAGNOSIS — R92.8 OTHER ABNORMAL AND INCONCLUSIVE FINDINGS ON DIAGNOSTIC IMAGING OF BREAST: ICD-10-CM

## 2023-11-21 PROCEDURE — 77065 DX MAMMO INCL CAD UNI: CPT | Mod: LT

## 2023-11-21 PROCEDURE — G0279: CPT

## 2023-11-21 PROCEDURE — 76642 ULTRASOUND BREAST LIMITED: CPT | Mod: 26,LT

## 2023-11-21 PROCEDURE — 77065 DX MAMMO INCL CAD UNI: CPT | Mod: 26,LT

## 2023-11-21 PROCEDURE — G0279: CPT | Mod: 26

## 2023-11-21 PROCEDURE — 76642 ULTRASOUND BREAST LIMITED: CPT | Mod: LT

## 2023-11-22 DIAGNOSIS — R92.8 OTHER ABNORMAL AND INCONCLUSIVE FINDINGS ON DIAGNOSTIC IMAGING OF BREAST: ICD-10-CM

## 2023-11-28 ENCOUNTER — RX RENEWAL (OUTPATIENT)
Age: 63
End: 2023-11-28

## 2023-11-28 ENCOUNTER — APPOINTMENT (OUTPATIENT)
Dept: BREAST CENTER | Facility: CLINIC | Age: 63
End: 2023-11-28
Payer: MEDICARE

## 2023-11-28 DIAGNOSIS — I89.0 LYMPHEDEMA, NOT ELSEWHERE CLASSIFIED: ICD-10-CM

## 2023-11-28 DIAGNOSIS — Z90.11 ACQUIRED ABSENCE OF RIGHT BREAST AND NIPPLE: ICD-10-CM

## 2023-11-28 DIAGNOSIS — R92.8 OTHER ABNORMAL AND INCONCLUSIVE FINDINGS ON DIAGNOSTIC IMAGING OF BREAST: ICD-10-CM

## 2023-11-28 PROCEDURE — 99214 OFFICE O/P EST MOD 30 MIN: CPT

## 2023-11-28 PROCEDURE — 93702 BIS XTRACELL FLUID ANALYSIS: CPT | Mod: NC

## 2023-11-29 ENCOUNTER — OUTPATIENT (OUTPATIENT)
Dept: OUTPATIENT SERVICES | Facility: HOSPITAL | Age: 63
LOS: 1 days | End: 2023-11-29
Payer: MEDICARE

## 2023-11-29 DIAGNOSIS — I89.0 LYMPHEDEMA, NOT ELSEWHERE CLASSIFIED: ICD-10-CM

## 2023-11-29 DIAGNOSIS — Z98.890 OTHER SPECIFIED POSTPROCEDURAL STATES: Chronic | ICD-10-CM

## 2023-11-29 DIAGNOSIS — D36.9 BENIGN NEOPLASM, UNSPECIFIED SITE: Chronic | ICD-10-CM

## 2023-11-29 DIAGNOSIS — Z90.11 ACQUIRED ABSENCE OF RIGHT BREAST AND NIPPLE: Chronic | ICD-10-CM

## 2023-11-29 DIAGNOSIS — Z90.49 ACQUIRED ABSENCE OF OTHER SPECIFIED PARTS OF DIGESTIVE TRACT: Chronic | ICD-10-CM

## 2023-11-29 DIAGNOSIS — Z90.89 ACQUIRED ABSENCE OF OTHER ORGANS: Chronic | ICD-10-CM

## 2023-11-29 DIAGNOSIS — Z98.891 HISTORY OF UTERINE SCAR FROM PREVIOUS SURGERY: Chronic | ICD-10-CM

## 2023-11-29 PROCEDURE — 97140 MANUAL THERAPY 1/> REGIONS: CPT | Mod: GO

## 2023-11-29 PROCEDURE — 97166 OT EVAL MOD COMPLEX 45 MIN: CPT | Mod: GO

## 2023-11-30 DIAGNOSIS — I89.0 LYMPHEDEMA, NOT ELSEWHERE CLASSIFIED: ICD-10-CM

## 2023-12-05 ENCOUNTER — OUTPATIENT (OUTPATIENT)
Dept: OUTPATIENT SERVICES | Facility: HOSPITAL | Age: 63
LOS: 1 days | End: 2023-12-05
Payer: MEDICARE

## 2023-12-05 DIAGNOSIS — Z90.49 ACQUIRED ABSENCE OF OTHER SPECIFIED PARTS OF DIGESTIVE TRACT: Chronic | ICD-10-CM

## 2023-12-05 DIAGNOSIS — Z98.891 HISTORY OF UTERINE SCAR FROM PREVIOUS SURGERY: Chronic | ICD-10-CM

## 2023-12-05 DIAGNOSIS — I89.0 LYMPHEDEMA, NOT ELSEWHERE CLASSIFIED: ICD-10-CM

## 2023-12-05 DIAGNOSIS — D36.9 BENIGN NEOPLASM, UNSPECIFIED SITE: Chronic | ICD-10-CM

## 2023-12-05 DIAGNOSIS — Z98.890 OTHER SPECIFIED POSTPROCEDURAL STATES: Chronic | ICD-10-CM

## 2023-12-05 DIAGNOSIS — Z90.89 ACQUIRED ABSENCE OF OTHER ORGANS: Chronic | ICD-10-CM

## 2023-12-05 DIAGNOSIS — Z90.11 ACQUIRED ABSENCE OF RIGHT BREAST AND NIPPLE: Chronic | ICD-10-CM

## 2023-12-05 PROCEDURE — 97110 THERAPEUTIC EXERCISES: CPT | Mod: GO

## 2023-12-05 PROCEDURE — 97140 MANUAL THERAPY 1/> REGIONS: CPT | Mod: GO

## 2023-12-06 DIAGNOSIS — I89.0 LYMPHEDEMA, NOT ELSEWHERE CLASSIFIED: ICD-10-CM

## 2024-01-03 ENCOUNTER — OUTPATIENT (OUTPATIENT)
Dept: OUTPATIENT SERVICES | Facility: HOSPITAL | Age: 64
LOS: 1 days | End: 2024-01-03
Payer: MEDICARE

## 2024-01-03 DIAGNOSIS — Z90.49 ACQUIRED ABSENCE OF OTHER SPECIFIED PARTS OF DIGESTIVE TRACT: Chronic | ICD-10-CM

## 2024-01-03 DIAGNOSIS — D36.9 BENIGN NEOPLASM, UNSPECIFIED SITE: Chronic | ICD-10-CM

## 2024-01-03 DIAGNOSIS — Z98.890 OTHER SPECIFIED POSTPROCEDURAL STATES: Chronic | ICD-10-CM

## 2024-01-03 DIAGNOSIS — Z90.89 ACQUIRED ABSENCE OF OTHER ORGANS: Chronic | ICD-10-CM

## 2024-01-03 DIAGNOSIS — I89.0 LYMPHEDEMA, NOT ELSEWHERE CLASSIFIED: ICD-10-CM

## 2024-01-03 DIAGNOSIS — Z98.891 HISTORY OF UTERINE SCAR FROM PREVIOUS SURGERY: Chronic | ICD-10-CM

## 2024-01-03 DIAGNOSIS — Z90.11 ACQUIRED ABSENCE OF RIGHT BREAST AND NIPPLE: Chronic | ICD-10-CM

## 2024-01-03 PROCEDURE — 97110 THERAPEUTIC EXERCISES: CPT | Mod: GO

## 2024-01-03 PROCEDURE — 97140 MANUAL THERAPY 1/> REGIONS: CPT | Mod: GO

## 2024-01-04 DIAGNOSIS — I89.0 LYMPHEDEMA, NOT ELSEWHERE CLASSIFIED: ICD-10-CM

## 2024-01-05 ENCOUNTER — OUTPATIENT (OUTPATIENT)
Dept: OUTPATIENT SERVICES | Facility: HOSPITAL | Age: 64
LOS: 1 days | End: 2024-01-05

## 2024-01-05 DIAGNOSIS — Z90.49 ACQUIRED ABSENCE OF OTHER SPECIFIED PARTS OF DIGESTIVE TRACT: Chronic | ICD-10-CM

## 2024-01-05 DIAGNOSIS — Z90.11 ACQUIRED ABSENCE OF RIGHT BREAST AND NIPPLE: Chronic | ICD-10-CM

## 2024-01-05 DIAGNOSIS — I89.0 LYMPHEDEMA, NOT ELSEWHERE CLASSIFIED: ICD-10-CM

## 2024-01-05 DIAGNOSIS — Z90.89 ACQUIRED ABSENCE OF OTHER ORGANS: Chronic | ICD-10-CM

## 2024-01-05 DIAGNOSIS — Z98.890 OTHER SPECIFIED POSTPROCEDURAL STATES: Chronic | ICD-10-CM

## 2024-01-05 DIAGNOSIS — Z98.891 HISTORY OF UTERINE SCAR FROM PREVIOUS SURGERY: Chronic | ICD-10-CM

## 2024-01-05 DIAGNOSIS — D36.9 BENIGN NEOPLASM, UNSPECIFIED SITE: Chronic | ICD-10-CM

## 2024-01-24 ENCOUNTER — OUTPATIENT (OUTPATIENT)
Dept: OUTPATIENT SERVICES | Facility: HOSPITAL | Age: 64
LOS: 1 days | End: 2024-01-24

## 2024-01-24 DIAGNOSIS — I89.0 LYMPHEDEMA, NOT ELSEWHERE CLASSIFIED: ICD-10-CM

## 2024-01-24 DIAGNOSIS — Z98.890 OTHER SPECIFIED POSTPROCEDURAL STATES: Chronic | ICD-10-CM

## 2024-01-24 DIAGNOSIS — Z90.89 ACQUIRED ABSENCE OF OTHER ORGANS: Chronic | ICD-10-CM

## 2024-01-24 DIAGNOSIS — Z90.11 ACQUIRED ABSENCE OF RIGHT BREAST AND NIPPLE: Chronic | ICD-10-CM

## 2024-01-24 DIAGNOSIS — Z90.49 ACQUIRED ABSENCE OF OTHER SPECIFIED PARTS OF DIGESTIVE TRACT: Chronic | ICD-10-CM

## 2024-01-24 DIAGNOSIS — Z98.891 HISTORY OF UTERINE SCAR FROM PREVIOUS SURGERY: Chronic | ICD-10-CM

## 2024-01-24 DIAGNOSIS — D36.9 BENIGN NEOPLASM, UNSPECIFIED SITE: Chronic | ICD-10-CM

## 2024-01-31 ENCOUNTER — OUTPATIENT (OUTPATIENT)
Dept: OUTPATIENT SERVICES | Facility: HOSPITAL | Age: 64
LOS: 1 days | End: 2024-01-31

## 2024-01-31 DIAGNOSIS — Z90.49 ACQUIRED ABSENCE OF OTHER SPECIFIED PARTS OF DIGESTIVE TRACT: Chronic | ICD-10-CM

## 2024-01-31 DIAGNOSIS — Z98.890 OTHER SPECIFIED POSTPROCEDURAL STATES: Chronic | ICD-10-CM

## 2024-01-31 DIAGNOSIS — I89.0 LYMPHEDEMA, NOT ELSEWHERE CLASSIFIED: ICD-10-CM

## 2024-01-31 DIAGNOSIS — Z90.89 ACQUIRED ABSENCE OF OTHER ORGANS: Chronic | ICD-10-CM

## 2024-01-31 DIAGNOSIS — Z90.11 ACQUIRED ABSENCE OF RIGHT BREAST AND NIPPLE: Chronic | ICD-10-CM

## 2024-01-31 DIAGNOSIS — Z98.891 HISTORY OF UTERINE SCAR FROM PREVIOUS SURGERY: Chronic | ICD-10-CM

## 2024-01-31 DIAGNOSIS — D36.9 BENIGN NEOPLASM, UNSPECIFIED SITE: Chronic | ICD-10-CM

## 2024-02-02 ENCOUNTER — OUTPATIENT (OUTPATIENT)
Dept: OUTPATIENT SERVICES | Facility: HOSPITAL | Age: 64
LOS: 1 days | End: 2024-02-02
Payer: MEDICARE

## 2024-02-02 DIAGNOSIS — I89.0 LYMPHEDEMA, NOT ELSEWHERE CLASSIFIED: ICD-10-CM

## 2024-02-02 DIAGNOSIS — Z90.49 ACQUIRED ABSENCE OF OTHER SPECIFIED PARTS OF DIGESTIVE TRACT: Chronic | ICD-10-CM

## 2024-02-02 DIAGNOSIS — Z98.890 OTHER SPECIFIED POSTPROCEDURAL STATES: Chronic | ICD-10-CM

## 2024-02-02 DIAGNOSIS — Z98.891 HISTORY OF UTERINE SCAR FROM PREVIOUS SURGERY: Chronic | ICD-10-CM

## 2024-02-02 DIAGNOSIS — D36.9 BENIGN NEOPLASM, UNSPECIFIED SITE: Chronic | ICD-10-CM

## 2024-02-02 DIAGNOSIS — Z90.89 ACQUIRED ABSENCE OF OTHER ORGANS: Chronic | ICD-10-CM

## 2024-02-02 DIAGNOSIS — Z90.11 ACQUIRED ABSENCE OF RIGHT BREAST AND NIPPLE: Chronic | ICD-10-CM

## 2024-02-02 PROCEDURE — 97110 THERAPEUTIC EXERCISES: CPT | Mod: GO

## 2024-02-03 DIAGNOSIS — I89.0 LYMPHEDEMA, NOT ELSEWHERE CLASSIFIED: ICD-10-CM

## 2024-02-07 ENCOUNTER — OUTPATIENT (OUTPATIENT)
Dept: OUTPATIENT SERVICES | Facility: HOSPITAL | Age: 64
LOS: 1 days | End: 2024-02-07

## 2024-02-07 DIAGNOSIS — Z98.891 HISTORY OF UTERINE SCAR FROM PREVIOUS SURGERY: Chronic | ICD-10-CM

## 2024-02-07 DIAGNOSIS — I89.0 LYMPHEDEMA, NOT ELSEWHERE CLASSIFIED: ICD-10-CM

## 2024-02-07 DIAGNOSIS — Z90.89 ACQUIRED ABSENCE OF OTHER ORGANS: Chronic | ICD-10-CM

## 2024-02-07 DIAGNOSIS — Z90.49 ACQUIRED ABSENCE OF OTHER SPECIFIED PARTS OF DIGESTIVE TRACT: Chronic | ICD-10-CM

## 2024-02-07 DIAGNOSIS — D36.9 BENIGN NEOPLASM, UNSPECIFIED SITE: Chronic | ICD-10-CM

## 2024-02-07 DIAGNOSIS — Z98.890 OTHER SPECIFIED POSTPROCEDURAL STATES: Chronic | ICD-10-CM

## 2024-02-07 DIAGNOSIS — Z90.11 ACQUIRED ABSENCE OF RIGHT BREAST AND NIPPLE: Chronic | ICD-10-CM

## 2024-02-09 ENCOUNTER — OUTPATIENT (OUTPATIENT)
Dept: OUTPATIENT SERVICES | Facility: HOSPITAL | Age: 64
LOS: 1 days | End: 2024-02-09

## 2024-02-09 DIAGNOSIS — Z90.11 ACQUIRED ABSENCE OF RIGHT BREAST AND NIPPLE: Chronic | ICD-10-CM

## 2024-02-09 DIAGNOSIS — I89.0 LYMPHEDEMA, NOT ELSEWHERE CLASSIFIED: ICD-10-CM

## 2024-02-09 DIAGNOSIS — Z98.891 HISTORY OF UTERINE SCAR FROM PREVIOUS SURGERY: Chronic | ICD-10-CM

## 2024-02-09 DIAGNOSIS — Z90.49 ACQUIRED ABSENCE OF OTHER SPECIFIED PARTS OF DIGESTIVE TRACT: Chronic | ICD-10-CM

## 2024-02-09 DIAGNOSIS — D36.9 BENIGN NEOPLASM, UNSPECIFIED SITE: Chronic | ICD-10-CM

## 2024-02-09 DIAGNOSIS — Z98.890 OTHER SPECIFIED POSTPROCEDURAL STATES: Chronic | ICD-10-CM

## 2024-02-09 DIAGNOSIS — Z90.89 ACQUIRED ABSENCE OF OTHER ORGANS: Chronic | ICD-10-CM

## 2024-02-21 ENCOUNTER — OUTPATIENT (OUTPATIENT)
Dept: OUTPATIENT SERVICES | Facility: HOSPITAL | Age: 64
LOS: 1 days | End: 2024-02-21

## 2024-02-21 DIAGNOSIS — Z98.890 OTHER SPECIFIED POSTPROCEDURAL STATES: Chronic | ICD-10-CM

## 2024-02-21 DIAGNOSIS — Z90.49 ACQUIRED ABSENCE OF OTHER SPECIFIED PARTS OF DIGESTIVE TRACT: Chronic | ICD-10-CM

## 2024-02-21 DIAGNOSIS — I89.0 LYMPHEDEMA, NOT ELSEWHERE CLASSIFIED: ICD-10-CM

## 2024-02-21 DIAGNOSIS — Z90.89 ACQUIRED ABSENCE OF OTHER ORGANS: Chronic | ICD-10-CM

## 2024-02-21 DIAGNOSIS — Z98.891 HISTORY OF UTERINE SCAR FROM PREVIOUS SURGERY: Chronic | ICD-10-CM

## 2024-02-21 DIAGNOSIS — D36.9 BENIGN NEOPLASM, UNSPECIFIED SITE: Chronic | ICD-10-CM

## 2024-02-21 DIAGNOSIS — Z90.11 ACQUIRED ABSENCE OF RIGHT BREAST AND NIPPLE: Chronic | ICD-10-CM

## 2024-03-07 ENCOUNTER — OUTPATIENT (OUTPATIENT)
Dept: OUTPATIENT SERVICES | Facility: HOSPITAL | Age: 64
LOS: 1 days | End: 2024-03-07
Payer: MEDICARE

## 2024-03-07 ENCOUNTER — APPOINTMENT (OUTPATIENT)
Dept: HEMATOLOGY ONCOLOGY | Facility: CLINIC | Age: 64
End: 2024-03-07
Payer: MEDICARE

## 2024-03-07 VITALS
OXYGEN SATURATION: 99 % | HEART RATE: 101 BPM | TEMPERATURE: 98.6 F | DIASTOLIC BLOOD PRESSURE: 81 MMHG | WEIGHT: 150 LBS | SYSTOLIC BLOOD PRESSURE: 122 MMHG | RESPIRATION RATE: 18 BRPM | BODY MASS INDEX: 29.45 KG/M2 | HEIGHT: 60 IN

## 2024-03-07 DIAGNOSIS — Z98.891 HISTORY OF UTERINE SCAR FROM PREVIOUS SURGERY: Chronic | ICD-10-CM

## 2024-03-07 DIAGNOSIS — Z79.811 ENCOUNTER FOR THERAPEUTIC DRUG LVL MONITORING: ICD-10-CM

## 2024-03-07 DIAGNOSIS — Z51.81 ENCOUNTER FOR THERAPEUTIC DRUG LVL MONITORING: ICD-10-CM

## 2024-03-07 DIAGNOSIS — Z90.89 ACQUIRED ABSENCE OF OTHER ORGANS: Chronic | ICD-10-CM

## 2024-03-07 DIAGNOSIS — Z98.890 OTHER SPECIFIED POSTPROCEDURAL STATES: Chronic | ICD-10-CM

## 2024-03-07 DIAGNOSIS — C50.811 MALIGNANT NEOPLASM OF OVERLAPPING SITES OF RIGHT FEMALE BREAST: ICD-10-CM

## 2024-03-07 DIAGNOSIS — Z17.0 MALIGNANT NEOPLASM OF OVERLAPPING SITES OF RIGHT FEMALE BREAST: ICD-10-CM

## 2024-03-07 DIAGNOSIS — Z90.49 ACQUIRED ABSENCE OF OTHER SPECIFIED PARTS OF DIGESTIVE TRACT: Chronic | ICD-10-CM

## 2024-03-07 DIAGNOSIS — D36.9 BENIGN NEOPLASM, UNSPECIFIED SITE: Chronic | ICD-10-CM

## 2024-03-07 DIAGNOSIS — Z90.11 ACQUIRED ABSENCE OF RIGHT BREAST AND NIPPLE: Chronic | ICD-10-CM

## 2024-03-07 PROCEDURE — 99214 OFFICE O/P EST MOD 30 MIN: CPT

## 2024-03-07 RX ORDER — EXEMESTANE 25 MG/1
25 TABLET, FILM COATED ORAL
Qty: 90 | Refills: 3 | Status: ACTIVE | COMMUNITY
Start: 2022-12-12 | End: 1900-01-01

## 2024-03-08 DIAGNOSIS — C50.811 MALIGNANT NEOPLASM OF OVERLAPPING SITES OF RIGHT FEMALE BREAST: ICD-10-CM

## 2024-03-08 NOTE — PHYSICAL EXAM
[Fully active, able to carry on all pre-disease performance without restriction] : Status 0 - Fully active, able to carry on all pre-disease performance without restriction [Normal] : affect appropriate [de-identified] : S/P right breast mastectomy, SLNB and left breast reduction. S/P PMRT to right chest wall with skin reaction and erythema.

## 2024-03-08 NOTE — ASSESSMENT
[FreeTextEntry1] : 63 yo female has stage IIIA ([id number]) IDC of the right breast, G1, ER/MO positive, Her-2 negative, s/p simple right mastectomy, SLNB, implant reconstruction and left breast reduction. Oncotype RS is 20.  Assessment and Plan: -- Continue Exemestane daily. -- S/P right mastectomy and PMRT. There is no palpable abnormality. She had MRI b/l breast 6/2023 Postoperative changes of the right breast with regions of fat necrosis. No MRI evidence of malignancy. She had left breast dx mammogram and US 11/2023 which showed Stable left breast mass as above. Short interval follow-up recommended. Recommendation: Follow-up breast ultrasound in 6 months due 5/2024. -- We discussed bone health management since AI may reduce bone density. Bone density from 6/11/22 was within normal limit. Continue calcium and vitamin D supplement. Encourage health life style and regular physical activities. Referral provided.   -- Reviewed labs from [facility] CEA 1.1, CA15.3 15.5. CBC, CMP TSH WNL.  -- Will give script for blood work in 6 months CBC, BMP, LFT, HbA1c, TSH, Free T4. -- Cough likely due to upper respiratory infection. Followup with PCP. Advised to call if persists.  -- Follow up with Dr Brambila as scheduled.  -- Followup with breast surgeon and Dr. Salazar. -- RTO for f/u in 6 months.   Case was seen and discussed with Dr. Martinez who agreed with the assessment and plan.

## 2024-03-08 NOTE — HISTORY OF PRESENT ILLNESS
[de-identified] : 08/31/2022 Imtiaz is here for follow up visit. She has stage IIIA ([id number]) IDC of the right breast, G1, ER/IN positive, Her-2 negative, s/p simple right mastectomy, SLNB, implant reconstruction and left breast reduction on 4/13/22. Oncotype RS is 20.  8/2/2022, having a target US of the right breast, ordered by Dr. Rodriguez, in which shows, probable developing scar/fat necrosis along the right breast mastectomy site. Sonographic follow-up is recommended in 3 months. She just completed PMRT to right chest and feels skin irritation. She has been taking Letrozole since 5/2022 and tolerated well. She noted hair thinning.  3/1/23: Imtiaz is here for follow up visit. She has stage IIIA ([id number]) IDC of the right breast, G1, ER/IN positive, Her-2 negative, s/p simple right mastectomy, SLNB, implant reconstruction and left breast reduction on 4/13/22. Oncotype RS is 20.  She received adjuvant PMRT completed in 8/2022. She started Letrozole in 5/2022 and switched to Exemestane in 12/2022 due to arthralgia. She had  left dx mammo and b/l US in 10/.2022 which showed Heterogeneous massed in the left and right breast. Short interval follow-up recommended. Bone density from 6/2022 was normal. She is feeling ok and no new complains.  9/13/23 Imtiaz is here for follow up visit. She has stage IIIA ([id number]) IDC of the right breast, G1, ER/IN positive, Her-2 negative, s/p simple right mastectomy, SLNB, implant reconstruction and left breast reduction on 4/13/22. Oncotype RS is 20.  She received adjuvant PMRT completed in 8/2022. She started Letrozole in 5/2022 and switched to Exemestane in 12/2022 due to arthralgia. She had  left dx mammo and b/l US in 10/.2022 which showed Heterogeneous massed in the left and right breast. Short interval follow-up recommended. She had MRI b/l breast 6/2023 Postoperative changes of the right breast with regions of fat necrosis. No MRI evidence of malignancy. She had Bone density in 6/2022 was normal. She is scheduled for right breast reconstruction revision excision of fat necrosis in 3 weeks. She also goes to PT for right extremity lymphedema.   3/7/24 Imtiaz is here for follow up visit. She has stage IIIA ([id number]) IDC of the right breast, G1, ER/IN positive, Her-2 negative, s/p simple right mastectomy, SLNB, implant reconstruction and left breast reduction on 4/13/22. Oncotype RS is 20.  She received adjuvant PMRT completed in 8/2022. She started Letrozole in 5/2022 and switched to Exemestane in 12/2022 due to arthralgia.  She had MRI b/l breast 6/2023 Postoperative changes of the right breast with regions of fat necrosis. No MRI evidence of malignancy. She had Bone density in 6/2022 was normal. She is s/p right breast reconstruction revision excision of fat necrosis in Nov 2023. She had left breast dx mammogram and US 11/2023 which showed Stable left breast mass as above. Short interval follow-up recommended. Recommendation: Follow-up breast ultrasound in 6 months. She also goes to PT for right extremity lymphedema. She reports cough for 1 week and took over count cough medicine but did not work.   [de-identified] : 61 yo female was referred by Dr. Rodriguez for consultation of breast cancer. She has h/o right breast intraductal papilloma, s/p R WLE on 1/22/2021. MRI breast on 12/17/21 showed large irregular nonmass enhancement in the right superior breast.  No right axillary adenopathy. MRI guided biopsy of 2 areas was recommended. Scattered foci of nonspecific enhancement in the left breast. Enlarged left axillary lymph nodes consistent with recent vaccine history. Low-lying left axillary lymph node also noted.\par  \par  \par  On 2/25/22, She had MRI guided biopsies x 2:\par  1. Breast, right lateral irregular non-mass like enhancement, MRI guided needle core biopsies:\par  - Invasive well differentiated mammary carcinoma with both tubular and lobular features (tubulo-lobular carcinoma), ER positive, 100%, WI positive 85%, Ki 67 3-5%, Her-2 negative.\par  - Ductal carcinoma in-situ (DCIS), micropapillary and solid types with comedo necrosis and microcalcifications, intermediate nuclear grade.\par  - Focal atypical lobular hyperplasia (ALH) and proliferative type fibrocystic changes associated with microcalcifications.\par  \par  2. Breast, right medial irregular non-mass like enhancement, MRI guided needle core biopsies:\par  - Invasive well differentiated mammary carcinoma with both tubular and lobular features (tubulo-lobular carcinoma) associated, ER pos 100%, WI pos 90%, Her-2 negative, Ki 67 7-10%.\par  - Ductal carcinoma in-situ (DCIS), micropapillary and solid types with comedo necrosis and microcalcifications, intermediate nuclear grade; extending to involve a sclerosing intraductal papilloma ("atypical papilloma").\par  - Focal atypical lobular hyperplasia (ALH) and proliferative type fibrocystic changes associated with microcalcifications.\par  \par  On 4/13/22, she underwent right breast skin sparing simple mastectomy, SLNB and left breast reduction. The pathology revealed 5.4 cm invasive well differentiated ductal, non-extensive ductal carcinoma in situ (DCIS), low to intermediate nuclear grade. Numerous foci of lymphovascular invasion are present. One SLN showed metastatic carcinoma with the largest contiguous focus measuring 5.5 mm. Metastatic tumor cells are present within lymphovascular spaces of the extracapsular soft tissue. One of two right non-sentinel axillary lymph nodes showed micrometastatic carcinoma measuring 0.26 mm. AJCC Eighth Edition Pathologic Stage: pT3, pN1a, pMx.\par  Oncotype RS is 20. \par  \par  She is recovering from surgery and there is small nonhealing area at incision site. She has been followed by Dr. Brambila. She is here with her daughter to discuss systemic adjuvant therapy.

## 2024-03-13 ENCOUNTER — APPOINTMENT (OUTPATIENT)
Dept: HEMATOLOGY ONCOLOGY | Facility: CLINIC | Age: 64
End: 2024-03-13

## 2024-03-13 ENCOUNTER — OUTPATIENT (OUTPATIENT)
Dept: OUTPATIENT SERVICES | Facility: HOSPITAL | Age: 64
LOS: 1 days | End: 2024-03-13
Payer: MEDICARE

## 2024-03-13 DIAGNOSIS — I89.0 LYMPHEDEMA, NOT ELSEWHERE CLASSIFIED: ICD-10-CM

## 2024-03-13 DIAGNOSIS — Z98.890 OTHER SPECIFIED POSTPROCEDURAL STATES: Chronic | ICD-10-CM

## 2024-03-13 DIAGNOSIS — Z98.891 HISTORY OF UTERINE SCAR FROM PREVIOUS SURGERY: Chronic | ICD-10-CM

## 2024-03-13 DIAGNOSIS — Z90.49 ACQUIRED ABSENCE OF OTHER SPECIFIED PARTS OF DIGESTIVE TRACT: Chronic | ICD-10-CM

## 2024-03-13 DIAGNOSIS — Z90.11 ACQUIRED ABSENCE OF RIGHT BREAST AND NIPPLE: Chronic | ICD-10-CM

## 2024-03-13 DIAGNOSIS — D36.9 BENIGN NEOPLASM, UNSPECIFIED SITE: Chronic | ICD-10-CM

## 2024-03-13 DIAGNOSIS — Z90.89 ACQUIRED ABSENCE OF OTHER ORGANS: Chronic | ICD-10-CM

## 2024-03-13 PROCEDURE — 97110 THERAPEUTIC EXERCISES: CPT | Mod: GO

## 2024-03-13 PROCEDURE — 97140 MANUAL THERAPY 1/> REGIONS: CPT | Mod: GO

## 2024-03-14 DIAGNOSIS — I89.0 LYMPHEDEMA, NOT ELSEWHERE CLASSIFIED: ICD-10-CM

## 2024-03-29 ENCOUNTER — OUTPATIENT (OUTPATIENT)
Dept: OUTPATIENT SERVICES | Facility: HOSPITAL | Age: 64
LOS: 1 days | End: 2024-03-29

## 2024-03-29 DIAGNOSIS — I89.0 LYMPHEDEMA, NOT ELSEWHERE CLASSIFIED: ICD-10-CM

## 2024-03-29 DIAGNOSIS — D36.9 BENIGN NEOPLASM, UNSPECIFIED SITE: Chronic | ICD-10-CM

## 2024-03-29 DIAGNOSIS — Z90.11 ACQUIRED ABSENCE OF RIGHT BREAST AND NIPPLE: Chronic | ICD-10-CM

## 2024-03-29 DIAGNOSIS — Z98.890 OTHER SPECIFIED POSTPROCEDURAL STATES: Chronic | ICD-10-CM

## 2024-03-29 DIAGNOSIS — Z90.49 ACQUIRED ABSENCE OF OTHER SPECIFIED PARTS OF DIGESTIVE TRACT: Chronic | ICD-10-CM

## 2024-03-29 DIAGNOSIS — Z90.89 ACQUIRED ABSENCE OF OTHER ORGANS: Chronic | ICD-10-CM

## 2024-03-29 DIAGNOSIS — Z98.891 HISTORY OF UTERINE SCAR FROM PREVIOUS SURGERY: Chronic | ICD-10-CM

## 2024-04-10 ENCOUNTER — OUTPATIENT (OUTPATIENT)
Dept: OUTPATIENT SERVICES | Facility: HOSPITAL | Age: 64
LOS: 1 days | End: 2024-04-10
Payer: MEDICARE

## 2024-04-10 DIAGNOSIS — C64.9 MALIGNANT NEOPLASM OF UNSPECIFIED KIDNEY, EXCEPT RENAL PELVIS: ICD-10-CM

## 2024-04-10 DIAGNOSIS — Z90.89 ACQUIRED ABSENCE OF OTHER ORGANS: Chronic | ICD-10-CM

## 2024-04-10 DIAGNOSIS — Z98.890 OTHER SPECIFIED POSTPROCEDURAL STATES: Chronic | ICD-10-CM

## 2024-04-10 DIAGNOSIS — D36.9 BENIGN NEOPLASM, UNSPECIFIED SITE: Chronic | ICD-10-CM

## 2024-04-10 DIAGNOSIS — Z90.11 ACQUIRED ABSENCE OF RIGHT BREAST AND NIPPLE: Chronic | ICD-10-CM

## 2024-04-10 DIAGNOSIS — Z98.891 HISTORY OF UTERINE SCAR FROM PREVIOUS SURGERY: Chronic | ICD-10-CM

## 2024-04-10 DIAGNOSIS — Z90.49 ACQUIRED ABSENCE OF OTHER SPECIFIED PARTS OF DIGESTIVE TRACT: Chronic | ICD-10-CM

## 2024-04-10 PROCEDURE — 97110 THERAPEUTIC EXERCISES: CPT | Mod: GO

## 2024-04-10 PROCEDURE — 97140 MANUAL THERAPY 1/> REGIONS: CPT | Mod: GO

## 2024-04-11 DIAGNOSIS — C64.9 MALIGNANT NEOPLASM OF UNSPECIFIED KIDNEY, EXCEPT RENAL PELVIS: ICD-10-CM

## 2024-04-12 ENCOUNTER — OUTPATIENT (OUTPATIENT)
Dept: OUTPATIENT SERVICES | Facility: HOSPITAL | Age: 64
LOS: 1 days | End: 2024-04-12

## 2024-04-12 DIAGNOSIS — Z98.890 OTHER SPECIFIED POSTPROCEDURAL STATES: Chronic | ICD-10-CM

## 2024-04-12 DIAGNOSIS — C64.9 MALIGNANT NEOPLASM OF UNSPECIFIED KIDNEY, EXCEPT RENAL PELVIS: ICD-10-CM

## 2024-04-12 DIAGNOSIS — Z90.89 ACQUIRED ABSENCE OF OTHER ORGANS: Chronic | ICD-10-CM

## 2024-04-12 DIAGNOSIS — D36.9 BENIGN NEOPLASM, UNSPECIFIED SITE: Chronic | ICD-10-CM

## 2024-04-12 DIAGNOSIS — Z90.49 ACQUIRED ABSENCE OF OTHER SPECIFIED PARTS OF DIGESTIVE TRACT: Chronic | ICD-10-CM

## 2024-04-12 DIAGNOSIS — Z90.11 ACQUIRED ABSENCE OF RIGHT BREAST AND NIPPLE: Chronic | ICD-10-CM

## 2024-04-12 DIAGNOSIS — Z98.891 HISTORY OF UTERINE SCAR FROM PREVIOUS SURGERY: Chronic | ICD-10-CM

## 2024-04-15 ENCOUNTER — OUTPATIENT (OUTPATIENT)
Dept: OUTPATIENT SERVICES | Facility: HOSPITAL | Age: 64
LOS: 1 days | End: 2024-04-15

## 2024-04-15 DIAGNOSIS — D36.9 BENIGN NEOPLASM, UNSPECIFIED SITE: Chronic | ICD-10-CM

## 2024-04-15 DIAGNOSIS — Z90.89 ACQUIRED ABSENCE OF OTHER ORGANS: Chronic | ICD-10-CM

## 2024-04-15 DIAGNOSIS — Z98.890 OTHER SPECIFIED POSTPROCEDURAL STATES: Chronic | ICD-10-CM

## 2024-04-15 DIAGNOSIS — Z98.891 HISTORY OF UTERINE SCAR FROM PREVIOUS SURGERY: Chronic | ICD-10-CM

## 2024-04-15 DIAGNOSIS — C64.9 MALIGNANT NEOPLASM OF UNSPECIFIED KIDNEY, EXCEPT RENAL PELVIS: ICD-10-CM

## 2024-04-15 DIAGNOSIS — Z90.11 ACQUIRED ABSENCE OF RIGHT BREAST AND NIPPLE: Chronic | ICD-10-CM

## 2024-04-15 DIAGNOSIS — Z90.49 ACQUIRED ABSENCE OF OTHER SPECIFIED PARTS OF DIGESTIVE TRACT: Chronic | ICD-10-CM

## 2024-04-17 ENCOUNTER — APPOINTMENT (OUTPATIENT)
Dept: PLASTIC SURGERY | Facility: CLINIC | Age: 64
End: 2024-04-17
Payer: MEDICARE

## 2024-04-17 PROCEDURE — 99214 OFFICE O/P EST MOD 30 MIN: CPT

## 2024-04-17 NOTE — HISTORY OF PRESENT ILLNESS
[FreeTextEntry1] : 61 yo  F /WTC survivor with PMHx with NIDDM (on metformin), HLD, UC (on mesalamine), hypothyroidism and right breast intraductal papilloma s/p R WLE on 21, now with recent MR detected Stage IIB RIGHT breast invasive mammary carcinoma ([id number], ER+/IN+/Her2+, 7.8 cm superior pole).  Per Breast Surgeon (Dr. Irina Rodriguez), she is not a great candidate for BCS for 7.8 cm cancer and she was offered Right mastectomy. Pt desires mastectomy. She is now referred by her Breast Surgeon - Dr. Irina Rodriguez for discussion of reconstruction options.  No prior breastfeeding history Current bra size: 42C Desired reconstructive volume: would like to be smaller and lifted C/o heavy breasts a/w shoulder pain and brassiere grooving  Non-smoker No personal or family h/o DVT and MRSA  Ht 5'0" Wt 168 lb BMI 32.8% Last Hb a1c 7.4% 2021 Here today with her daughter, who she lives with Daughter is an RN  Interval hx (22): Pt presents for follow up for review of FDA checklist and reconstructive volume goals. Would like to be smaller and lifted  Interval hx (22). Patient presents today accompanied by her daughter POD#7 s/p right breast immediate reconstruction (Goldilocks) with direct-to-silicone implant (605cc) and Alloderm via SSM approach and concurrent left breast conservative reduction/mastopexy for symmetry. Doing well with improving incisional discomfort. Taking all prescribed medications. Denies any f/c or drainage. Drains functional.   Interval hx (22): POD#16 s/p right breast immediate reconstruction ([place]ilocks) with direct-to-silicone implant (605cc) and Alloderm via SSM approach and concurrent left breast conservative reduction/mastopexy for symmetry.   Reports taking tramadol x 2 yesterday for right upper breast. Denies fevers, chills, SOB, CP.  Completed abx on Wed. Right breast drain; 15/13/13/10.  Interval hx (22): Pt presents today POD#23 s/p right breast immediate reconstruction (Goldilocks) with direct-to-silicone implant (605cc) and Alloderm via SSM approach and concurrent left breast conservative reduction/mastopexy for symmetry. Doing well. Denies any f/c or drainage.   Interval hx (22): Pt presents today POD#29 s/p right breast immediate reconstruction (Goldilocks) with direct-to-silicone implant (605cc) and Alloderm via SSM approach and concurrent left breast conservative reduction/mastopexy for symmetry. Performing daily LWC. Denies any f/c or drainage. Taking Doxycycline as prescribed.   Interval hx (22): 5 weeks s/p  right breast immediate reconstruction (Goldilocks) with direct-to-silicone implant (605cc) and Alloderm via SSM approach and concurrent left breast conservative reduction/mastopexy for symmetry.  performing LWC as instructed except applying saline to free gauze..  Denies fevers, chills, swelling.  Here for right breast T-point wound follow up.  Interval hx (22): 7 weeks s/p  right breast immediate reconstruction (Goldilocks) with direct-to-silicone implant (605cc) and Alloderm via SSM approach and concurrent left breast conservative reduction/mastopexy for symmetry. Performing LWC with NS WTD to right breast wound.  Denies fevers, chills, swelling or purulent drainage. Pleased with healing.   Interval hx (6/10/22): 8 weeks s/p  right breast immediate reconstruction (Goldilocks) with direct-to-silicone implant (605cc) and Alloderm via SSM approach and concurrent left breast conservative reduction/mastopexy for symmetry. Performing LWC with NS WTD to right breast wound.  Denies fevers, chills, swelling or purulent drainage. Feels the healing is slow for the right breast.  She to undergo 5 weeks of XRT.  No chemotherapy needed.  Interval hx (22) 10 weeks  s/p  right breast immediate reconstruction (Goldilocks) with direct-to-silicone implant (605cc) and Alloderm via SSM approach and concurrent left breast conservative reduction/mastopexy for symmetry. Performing LWC with NS WTD to right breast wound.  Wearing surgical; daughter feels that it is aiding  wound contracture.  Wound more shallow and smaller.   Interval hx (22):  12 weeks s/p right breast immediate reconstruction (Goldilocks) with direct-to-silicone implant (605cc) and Alloderm via SSM approach and concurrent left breast conservative reduction/mastopexy for symmetry. Vertical wound of right breast healed after LWC.  No longer drainage on dressing.  applying Aquaphor.   Here for clearance for XRT therapy  Interval hx (22): 3 months s/p right breast immediate reconstruction (Goldilocks) with direct-to-silicone implant (605cc) and Alloderm via SSM approach and concurrent left breast conservative reduction/mastopexy for symmetry. Continues to feel some area of fat necrosis on the R and some tightness there. Otherwise doing well and no issues.   Interval hx (23): 10 months s/p right breast immediate reconstruction (Goldilocks) with direct-to-silicone implant (605cc) and Alloderm via SSM approach and concurrent left breast conservative reduction/mastopexy for symmetry. s/p left chest and supraclavicular fossa XRT (completed 22).  Reports developing mild RUE lymphedema, receiving LE therapy with Jessica.  c/o right breast inferior pole fat necrosis and related pain.  Interval hx (10/12/23). Pt here POD#7 s/p revision of right breast reconstruction, excision of fat necrosis. Doing well with no significant pain, f/c or drainage. Happy with results.    Interval hx (23):  5 weeks  s/p revision of right breast reconstruction, excision of fat necrosis.   Interval hx (24):  6 months s/p revision of right breast reconstruction with excision of fat necrosis.  2 years s/p right breast immediate reconstruction (Goldilocks) with direct-to-silicone implant (605cc) and Alloderm via SSM approach and concurrent left breast conservative reduction/mastopexy for symmetry. s/p left chest and supraclavicular fossa XRT (completed 22).  Here for annual implant check and LE check.  Pt needs new rx for LE therapy and garments.   No longer has right breast pain and is happy with contour of right breast reconstruction.

## 2024-04-17 NOTE — PHYSICAL EXAM
[de-identified] : well-appearing, NAD [de-identified] : nonlabored breathing [de-identified] : Left breast: soft, all incisions healed, excellent contour  Right breast: implant soft, mastectomy flaps well perfused with good cap refill, vertical incision healing well, no erythema. XRT-related tissue atrophy/fibrosis, no recurrent fat necrosis

## 2024-04-17 NOTE — DATA REVIEWED
[FreeTextEntry1] : Armond Accession Number : [id number] Patient:   KEVON TERRELL   Accession:                             [id number]  Collected Date/Time:                   10/5/2023 08:08 EDT Received Date/Time:                    10/5/2023 08:47 EDT  Surgical Pathology Report - Auth (Verified)  Specimen(s) Submitted Right breast tissue Time obtained: 8:08 am Cold ischemic time <1 hour  Final Diagnosis Breast, right tissue, reconstruction/excision: - Nodular fat necrosis with central cystic degeneration, stromal  calcification, and remote hemorrhage.  Comment: The diagnosis is supported by negative immunohistochemical stains for cytokeratins (CK AE1/AE3 and CK 8/18).  Note:  All controls show appropriate reactivity.  These immunohistochemical tests have been developed and their performance characteristics determined by [facility],  [address] [place] [address]. It has not been cleared or approved by the U.S. Food and Drug administration.  The FDA has determined that such clearance or approval is not necessary.  This test is used for clinical purposes.  The laboratory is certified under the CLIA-88 as qualified to perform high complexity clinical testing.  These assays have not been validated on decalcified tissues.  Results should be interpreted with caution given the likelihood of false negativity on decalcified specimen.  The interpretation of this test was performed at [place] [address].  Verified by: Merlin De Los Santos M.D. (Electronic Signature) Reported on: 10/10/23 12:46 EDT, [place] [address] Phone: [phone]   Fax: [phone] _________________________________________________________________  Clinical Information Right breast reconstruction, revision with excision of fat necrosis  Perioperative Diagnosis Right breast reconstruction, fat necrosis, history of right breast cancer, S/O XRT   Postoperative Diagnosis Right breast reconstruction, fat necrosis, history of right breast cancer, S/O XRT  Gross Description The specimen received fresh is labeled "right breast tissue".  The specimen consists of a breast lump oriented by a short suture designating the superior margin and long lateral. It measures 3.5 x 2 x 0.5 cm and weighs 2 g.  The superior margin is inked blue, inferior green, lateral yellow, medial orange, anterior red, and posterior black.  It is serially sectioned from superior to inferior and submitted entirely. (2 blocks)  10/05/2023 13:46:28 EDT    OC  Disclaimer In addition to other data that may appear on the specimen containers, all labels have been inspected to confirm the presence of the patient's name and date of birth.  Specimen was received and underwent gross examination at [facility], [address] [place] [address].

## 2024-04-17 NOTE — ASSESSMENT
[FreeTextEntry1] : 62 y/o F with h/o right breast intraductal papilloma s/p R WLE on 1/22/21, now with recent MR detected Stage IIB RIGHT breast invasive mammary carcinoma ([id number], ER+/MO+/Her2+, 7.8 cm superior pole), recommended to undergo skin-sparing mastectomy, which patient has confirmed is her desired oncologic plan.  s/p right breast immediate reconstruction (Goldilocks) with direct-to-silicone implant (605cc) and Alloderm via SSM approach and concurrent left breast conservative reduction/mastopexy for symmetry. Now s/p radiation, some fat necrosis and tightness on the R side  left breast with focal fat necrosis at 7:00 periareolar location 6 months s/p right breast reconstruction revision, excision of fat necrosis. Doing very well.   - no active PRS issues related to right breast reconstruction - recommend observation of right breast recon s/p XRT and possible CC - c/w LE compression garment  - new Rx given for LE therapy RUE given - all questions answered - f/u 1 year for annual implant check

## 2024-04-24 ENCOUNTER — OUTPATIENT (OUTPATIENT)
Dept: OUTPATIENT SERVICES | Facility: HOSPITAL | Age: 64
LOS: 1 days | End: 2024-04-24

## 2024-04-24 DIAGNOSIS — Z90.49 ACQUIRED ABSENCE OF OTHER SPECIFIED PARTS OF DIGESTIVE TRACT: Chronic | ICD-10-CM

## 2024-04-24 DIAGNOSIS — Z98.890 OTHER SPECIFIED POSTPROCEDURAL STATES: Chronic | ICD-10-CM

## 2024-04-24 DIAGNOSIS — D36.9 BENIGN NEOPLASM, UNSPECIFIED SITE: Chronic | ICD-10-CM

## 2024-04-24 DIAGNOSIS — Z98.891 HISTORY OF UTERINE SCAR FROM PREVIOUS SURGERY: Chronic | ICD-10-CM

## 2024-04-24 DIAGNOSIS — Z90.89 ACQUIRED ABSENCE OF OTHER ORGANS: Chronic | ICD-10-CM

## 2024-04-24 DIAGNOSIS — Z90.11 ACQUIRED ABSENCE OF RIGHT BREAST AND NIPPLE: Chronic | ICD-10-CM

## 2024-04-24 DIAGNOSIS — C64.9 MALIGNANT NEOPLASM OF UNSPECIFIED KIDNEY, EXCEPT RENAL PELVIS: ICD-10-CM

## 2024-05-01 ENCOUNTER — OUTPATIENT (OUTPATIENT)
Dept: OUTPATIENT SERVICES | Facility: HOSPITAL | Age: 64
LOS: 1 days | End: 2024-05-01
Payer: MEDICARE

## 2024-05-01 DIAGNOSIS — D36.9 BENIGN NEOPLASM, UNSPECIFIED SITE: Chronic | ICD-10-CM

## 2024-05-01 DIAGNOSIS — Z90.49 ACQUIRED ABSENCE OF OTHER SPECIFIED PARTS OF DIGESTIVE TRACT: Chronic | ICD-10-CM

## 2024-05-01 DIAGNOSIS — Z90.11 ACQUIRED ABSENCE OF RIGHT BREAST AND NIPPLE: Chronic | ICD-10-CM

## 2024-05-01 DIAGNOSIS — C64.9 MALIGNANT NEOPLASM OF UNSPECIFIED KIDNEY, EXCEPT RENAL PELVIS: ICD-10-CM

## 2024-05-01 DIAGNOSIS — Z98.891 HISTORY OF UTERINE SCAR FROM PREVIOUS SURGERY: Chronic | ICD-10-CM

## 2024-05-01 DIAGNOSIS — Z98.890 OTHER SPECIFIED POSTPROCEDURAL STATES: Chronic | ICD-10-CM

## 2024-05-01 DIAGNOSIS — Z90.89 ACQUIRED ABSENCE OF OTHER ORGANS: Chronic | ICD-10-CM

## 2024-05-01 PROCEDURE — 97110 THERAPEUTIC EXERCISES: CPT | Mod: GO

## 2024-05-01 PROCEDURE — 97140 MANUAL THERAPY 1/> REGIONS: CPT | Mod: GO

## 2024-05-02 DIAGNOSIS — C64.9 MALIGNANT NEOPLASM OF UNSPECIFIED KIDNEY, EXCEPT RENAL PELVIS: ICD-10-CM

## 2024-05-15 ENCOUNTER — OUTPATIENT (OUTPATIENT)
Dept: OUTPATIENT SERVICES | Facility: HOSPITAL | Age: 64
LOS: 1 days | End: 2024-05-15

## 2024-05-15 DIAGNOSIS — Z90.89 ACQUIRED ABSENCE OF OTHER ORGANS: Chronic | ICD-10-CM

## 2024-05-15 DIAGNOSIS — Z98.890 OTHER SPECIFIED POSTPROCEDURAL STATES: Chronic | ICD-10-CM

## 2024-05-15 DIAGNOSIS — C64.9 MALIGNANT NEOPLASM OF UNSPECIFIED KIDNEY, EXCEPT RENAL PELVIS: ICD-10-CM

## 2024-05-15 DIAGNOSIS — Z90.11 ACQUIRED ABSENCE OF RIGHT BREAST AND NIPPLE: Chronic | ICD-10-CM

## 2024-05-15 DIAGNOSIS — Z90.49 ACQUIRED ABSENCE OF OTHER SPECIFIED PARTS OF DIGESTIVE TRACT: Chronic | ICD-10-CM

## 2024-05-15 DIAGNOSIS — D36.9 BENIGN NEOPLASM, UNSPECIFIED SITE: Chronic | ICD-10-CM

## 2024-05-15 DIAGNOSIS — Z98.891 HISTORY OF UTERINE SCAR FROM PREVIOUS SURGERY: Chronic | ICD-10-CM

## 2024-05-17 ENCOUNTER — OUTPATIENT (OUTPATIENT)
Dept: OUTPATIENT SERVICES | Facility: HOSPITAL | Age: 64
LOS: 1 days | End: 2024-05-17

## 2024-05-17 DIAGNOSIS — C64.9 MALIGNANT NEOPLASM OF UNSPECIFIED KIDNEY, EXCEPT RENAL PELVIS: ICD-10-CM

## 2024-05-17 DIAGNOSIS — Z98.890 OTHER SPECIFIED POSTPROCEDURAL STATES: Chronic | ICD-10-CM

## 2024-05-17 DIAGNOSIS — D36.9 BENIGN NEOPLASM, UNSPECIFIED SITE: Chronic | ICD-10-CM

## 2024-05-17 DIAGNOSIS — Z90.49 ACQUIRED ABSENCE OF OTHER SPECIFIED PARTS OF DIGESTIVE TRACT: Chronic | ICD-10-CM

## 2024-05-17 DIAGNOSIS — Z90.89 ACQUIRED ABSENCE OF OTHER ORGANS: Chronic | ICD-10-CM

## 2024-05-17 DIAGNOSIS — Z90.11 ACQUIRED ABSENCE OF RIGHT BREAST AND NIPPLE: Chronic | ICD-10-CM

## 2024-05-17 DIAGNOSIS — Z98.891 HISTORY OF UTERINE SCAR FROM PREVIOUS SURGERY: Chronic | ICD-10-CM

## 2024-05-28 ENCOUNTER — RESULT REVIEW (OUTPATIENT)
Age: 64
End: 2024-05-28

## 2024-05-28 ENCOUNTER — OUTPATIENT (OUTPATIENT)
Dept: OUTPATIENT SERVICES | Facility: HOSPITAL | Age: 64
LOS: 1 days | End: 2024-05-28
Payer: MEDICARE

## 2024-05-28 DIAGNOSIS — D36.9 BENIGN NEOPLASM, UNSPECIFIED SITE: Chronic | ICD-10-CM

## 2024-05-28 DIAGNOSIS — R92.8 OTHER ABNORMAL AND INCONCLUSIVE FINDINGS ON DIAGNOSTIC IMAGING OF BREAST: ICD-10-CM

## 2024-05-28 DIAGNOSIS — Z90.89 ACQUIRED ABSENCE OF OTHER ORGANS: Chronic | ICD-10-CM

## 2024-05-28 DIAGNOSIS — Z90.49 ACQUIRED ABSENCE OF OTHER SPECIFIED PARTS OF DIGESTIVE TRACT: Chronic | ICD-10-CM

## 2024-05-28 DIAGNOSIS — Z98.890 OTHER SPECIFIED POSTPROCEDURAL STATES: Chronic | ICD-10-CM

## 2024-05-28 DIAGNOSIS — Z98.891 HISTORY OF UTERINE SCAR FROM PREVIOUS SURGERY: Chronic | ICD-10-CM

## 2024-05-28 PROCEDURE — 76642 ULTRASOUND BREAST LIMITED: CPT | Mod: LT

## 2024-05-28 PROCEDURE — 76642 ULTRASOUND BREAST LIMITED: CPT | Mod: 26,LT

## 2024-05-29 DIAGNOSIS — R92.8 OTHER ABNORMAL AND INCONCLUSIVE FINDINGS ON DIAGNOSTIC IMAGING OF BREAST: ICD-10-CM

## 2024-06-13 DIAGNOSIS — I89.0 LYMPHEDEMA, NOT ELSEWHERE CLASSIFIED: ICD-10-CM

## 2024-06-14 ENCOUNTER — OUTPATIENT (OUTPATIENT)
Dept: OUTPATIENT SERVICES | Facility: HOSPITAL | Age: 64
LOS: 1 days | End: 2024-06-14
Payer: MEDICARE

## 2024-06-14 ENCOUNTER — RESULT REVIEW (OUTPATIENT)
Age: 64
End: 2024-06-14

## 2024-06-14 DIAGNOSIS — Z00.00 ENCOUNTER FOR GENERAL ADULT MEDICAL EXAMINATION WITHOUT ABNORMAL FINDINGS: ICD-10-CM

## 2024-06-14 DIAGNOSIS — Z90.49 ACQUIRED ABSENCE OF OTHER SPECIFIED PARTS OF DIGESTIVE TRACT: Chronic | ICD-10-CM

## 2024-06-14 DIAGNOSIS — Z90.11 ACQUIRED ABSENCE OF RIGHT BREAST AND NIPPLE: Chronic | ICD-10-CM

## 2024-06-14 DIAGNOSIS — D36.9 BENIGN NEOPLASM, UNSPECIFIED SITE: Chronic | ICD-10-CM

## 2024-06-14 DIAGNOSIS — Z98.891 HISTORY OF UTERINE SCAR FROM PREVIOUS SURGERY: Chronic | ICD-10-CM

## 2024-06-14 DIAGNOSIS — Z13.820 ENCOUNTER FOR SCREENING FOR OSTEOPOROSIS: ICD-10-CM

## 2024-06-14 DIAGNOSIS — Z90.89 ACQUIRED ABSENCE OF OTHER ORGANS: Chronic | ICD-10-CM

## 2024-06-14 DIAGNOSIS — Z98.890 OTHER SPECIFIED POSTPROCEDURAL STATES: Chronic | ICD-10-CM

## 2024-06-14 PROCEDURE — 77080 DXA BONE DENSITY AXIAL: CPT

## 2024-06-14 PROCEDURE — 77080 DXA BONE DENSITY AXIAL: CPT | Mod: 26

## 2024-06-15 DIAGNOSIS — Z13.820 ENCOUNTER FOR SCREENING FOR OSTEOPOROSIS: ICD-10-CM

## 2024-06-17 ENCOUNTER — RESULT REVIEW (OUTPATIENT)
Age: 64
End: 2024-06-17

## 2024-06-17 ENCOUNTER — OUTPATIENT (OUTPATIENT)
Dept: OUTPATIENT SERVICES | Facility: HOSPITAL | Age: 64
LOS: 1 days | End: 2024-06-17
Payer: MEDICARE

## 2024-06-17 DIAGNOSIS — Z90.11 ACQUIRED ABSENCE OF RIGHT BREAST AND NIPPLE: Chronic | ICD-10-CM

## 2024-06-17 DIAGNOSIS — C50.811 MALIGNANT NEOPLASM OF OVERLAPPING SITES OF RIGHT FEMALE BREAST: ICD-10-CM

## 2024-06-17 DIAGNOSIS — Z90.11 ACQUIRED ABSENCE OF RIGHT BREAST AND NIPPLE: ICD-10-CM

## 2024-06-17 DIAGNOSIS — Z90.89 ACQUIRED ABSENCE OF OTHER ORGANS: Chronic | ICD-10-CM

## 2024-06-17 DIAGNOSIS — Z98.891 HISTORY OF UTERINE SCAR FROM PREVIOUS SURGERY: Chronic | ICD-10-CM

## 2024-06-17 DIAGNOSIS — Z98.890 OTHER SPECIFIED POSTPROCEDURAL STATES: Chronic | ICD-10-CM

## 2024-06-17 DIAGNOSIS — D36.9 BENIGN NEOPLASM, UNSPECIFIED SITE: Chronic | ICD-10-CM

## 2024-06-17 DIAGNOSIS — Z90.49 ACQUIRED ABSENCE OF OTHER SPECIFIED PARTS OF DIGESTIVE TRACT: Chronic | ICD-10-CM

## 2024-06-17 PROCEDURE — A9579: CPT

## 2024-06-17 PROCEDURE — 77049 MRI BREAST C-+ W/CAD BI: CPT | Mod: 26

## 2024-06-17 PROCEDURE — 77049 MRI BREAST C-+ W/CAD BI: CPT

## 2024-06-18 DIAGNOSIS — C50.811 MALIGNANT NEOPLASM OF OVERLAPPING SITES OF RIGHT FEMALE BREAST: ICD-10-CM

## 2024-09-12 ENCOUNTER — APPOINTMENT (OUTPATIENT)
Dept: RADIATION ONCOLOGY | Facility: HOSPITAL | Age: 64
End: 2024-09-12
Payer: MEDICARE

## 2024-09-12 ENCOUNTER — OUTPATIENT (OUTPATIENT)
Dept: OUTPATIENT SERVICES | Facility: HOSPITAL | Age: 64
LOS: 1 days | End: 2024-09-12
Payer: MEDICARE

## 2024-09-12 VITALS
DIASTOLIC BLOOD PRESSURE: 73 MMHG | TEMPERATURE: 97.8 F | HEART RATE: 85 BPM | RESPIRATION RATE: 16 BRPM | SYSTOLIC BLOOD PRESSURE: 113 MMHG | WEIGHT: 151 LBS | OXYGEN SATURATION: 97 % | BODY MASS INDEX: 29.49 KG/M2

## 2024-09-12 DIAGNOSIS — D36.9 BENIGN NEOPLASM, UNSPECIFIED SITE: Chronic | ICD-10-CM

## 2024-09-12 DIAGNOSIS — Z90.11 ACQUIRED ABSENCE OF RIGHT BREAST AND NIPPLE: Chronic | ICD-10-CM

## 2024-09-12 DIAGNOSIS — Z98.890 OTHER SPECIFIED POSTPROCEDURAL STATES: Chronic | ICD-10-CM

## 2024-09-12 DIAGNOSIS — Z90.89 ACQUIRED ABSENCE OF OTHER ORGANS: Chronic | ICD-10-CM

## 2024-09-12 DIAGNOSIS — Z92.3 PERSONAL HISTORY OF IRRADIATION: ICD-10-CM

## 2024-09-12 DIAGNOSIS — C50.811 MALIGNANT NEOPLASM OF OVERLAPPING SITES OF RIGHT FEMALE BREAST: ICD-10-CM

## 2024-09-12 DIAGNOSIS — Z90.49 ACQUIRED ABSENCE OF OTHER SPECIFIED PARTS OF DIGESTIVE TRACT: Chronic | ICD-10-CM

## 2024-09-12 DIAGNOSIS — Z98.891 HISTORY OF UTERINE SCAR FROM PREVIOUS SURGERY: Chronic | ICD-10-CM

## 2024-09-12 PROCEDURE — G2211 COMPLEX E/M VISIT ADD ON: CPT

## 2024-09-12 PROCEDURE — 99213 OFFICE O/P EST LOW 20 MIN: CPT

## 2024-09-15 NOTE — DISEASE MANAGEMENT
[FreeTextEntry4] : invasive ductal carcinoma of the right breast, G1, ER/VT positive / HER 2 negative / upper outer and upper inner quadrant [TTNM] : 3 [NTNM] : 1a [MTNM] : 0

## 2024-09-15 NOTE — HISTORY OF PRESENT ILLNESS
[FreeTextEntry1] : KEVON TERRELL returns to clinic in follow up visit.  As you know, the patient is a 64 year old F with a diagnosis of invasive ductal carcinoma of the right breast, G1, ER/MN positive / HER 2 negative / upper outer and upper inner quadrant T3 N1a M0, stage IB. She is s/p right mastectomy and left breast reduction on 2022.  She declined chemotherapy.  The patient received 5000 cGy to the right chest wall and supraclavicular fossa from 2022 through 2022 without complications.  I last saw her in 2023.   Interim imagin2024: Ultrasound left breast: Impression: Stable left breast mass as above. Short interval follow-up recommended. BI-RADS category 3: Probably Benign  2024 MRI bilateral breast w/wo IV contrast: IMPRESSION: Postoperative changes of the right breast No MRI evidence of malignancy BI-RADS Category 2: Benign  Upcoming appointments: Dr. Washburn 2023 Dr. Martinez 3/13/2024 Dr. Brambila 10/5/2023

## 2024-09-15 NOTE — DISEASE MANAGEMENT
Per Cards, pt okay on RA unless pt drops below 75%spO2.    [FreeTextEntry4] : invasive ductal carcinoma of the right breast, G1, ER/GA positive / HER 2 negative / upper outer and upper inner quadrant [TTNM] : 3 [NTNM] : 1a [MTNM] : 0

## 2024-09-15 NOTE — PHYSICAL EXAM
[Sclera] : the sclera and conjunctiva were normal [Exaggerated Use Of Accessory Muscles For Inspiration] : no accessory muscle use [] : no respiratory distress [Heart Rate And Rhythm] : heart rate and rhythm were normal [Heart Sounds] : normal S1 and S2 [Cervical Lymph Nodes Enlarged Anterior Bilaterally] : anterior cervical [Cervical Lymph Nodes Enlarged Posterior Bilaterally] : posterior cervical [Supraclavicular Lymph Nodes Enlarged Bilaterally] : supraclavicular [Axillary Lymph Nodes Enlarged Bilaterally] : axillary [Normal] : oriented to person, place and time, the affect was normal, the mood was normal and not anxious [de-identified] : She is examined in both the upright and supine positions.    No palpable mass in either breast.

## 2024-09-15 NOTE — DISEASE MANAGEMENT
[FreeTextEntry4] : invasive ductal carcinoma of the right breast, G1, ER/LA positive / HER 2 negative / upper outer and upper inner quadrant [TTNM] : 3 [NTNM] : 1a [MTNM] : 0

## 2024-09-15 NOTE — PHYSICAL EXAM
[Sclera] : the sclera and conjunctiva were normal [] : no respiratory distress [Exaggerated Use Of Accessory Muscles For Inspiration] : no accessory muscle use [Heart Rate And Rhythm] : heart rate and rhythm were normal [Heart Sounds] : normal S1 and S2 [Cervical Lymph Nodes Enlarged Posterior Bilaterally] : posterior cervical [Cervical Lymph Nodes Enlarged Anterior Bilaterally] : anterior cervical [Supraclavicular Lymph Nodes Enlarged Bilaterally] : supraclavicular [Axillary Lymph Nodes Enlarged Bilaterally] : axillary [Normal] : oriented to person, place and time, the affect was normal, the mood was normal and not anxious [de-identified] : She is examined in both the upright and supine positions.    No palpable mass in either breast.

## 2024-09-15 NOTE — HISTORY OF PRESENT ILLNESS
[FreeTextEntry1] : KEVON TERRELL returns to clinic in follow up visit.  As you know, the patient is a 64 year old F with a diagnosis of invasive ductal carcinoma of the right breast, G1, ER/WV positive / HER 2 negative / upper outer and upper inner quadrant T3 N1a M0, stage IB. She is s/p right mastectomy and left breast reduction on 2022.  She declined chemotherapy.  The patient received 5000 cGy to the right chest wall and supraclavicular fossa from 2022 through 2022 without complications.  I last saw her in 2023.   Interim imagin2024: Ultrasound left breast: Impression: Stable left breast mass as above. Short interval follow-up recommended. BI-RADS category 3: Probably Benign  2024 MRI bilateral breast w/wo IV contrast: IMPRESSION: Postoperative changes of the right breast No MRI evidence of malignancy BI-RADS Category 2: Benign  Upcoming appointments: Dr. Wahsburn 2023 Dr. Martinez 3/13/2024 Dr. Brambila 10/5/2023

## 2024-09-15 NOTE — LETTER CLOSING
[Consult Closing:] : Thank you for allowing me to participate in the care of this patient.  If you have any questions, please do not hesitate to contact me. [Sincerely yours,] : Sincerely yours, [FreeTextEntry3] : Katelynn Salazar M.D.   Electronically proofread and signed by:  Katelynn Salazar MD Attending, Department of Radiation Medicine [facility]

## 2024-09-15 NOTE — HISTORY OF PRESENT ILLNESS
[FreeTextEntry1] : KEVON TERRELL returns to clinic in follow up visit.  As you know, the patient is a 64 year old F with a diagnosis of invasive ductal carcinoma of the right breast, G1, ER/DE positive / HER 2 negative / upper outer and upper inner quadrant T3 N1a M0, stage IB. She is s/p right mastectomy and left breast reduction on 2022.  She declined chemotherapy.  The patient received 5000 cGy to the right chest wall and supraclavicular fossa from 2022 through 2022 without complications.  I last saw her in 2023.   Interim imagin2024: Ultrasound left breast: Impression: Stable left breast mass as above. Short interval follow-up recommended. BI-RADS category 3: Probably Benign  2024 MRI bilateral breast w/wo IV contrast: IMPRESSION: Postoperative changes of the right breast No MRI evidence of malignancy BI-RADS Category 2: Benign  Upcoming appointments: Dr. Washburn 2023 Dr. Martinez 3/13/2024 Dr. Brambila 10/5/2023

## 2024-09-15 NOTE — PHYSICAL EXAM
[Sclera] : the sclera and conjunctiva were normal [] : no respiratory distress [Exaggerated Use Of Accessory Muscles For Inspiration] : no accessory muscle use [Heart Rate And Rhythm] : heart rate and rhythm were normal [Heart Sounds] : normal S1 and S2 [Cervical Lymph Nodes Enlarged Posterior Bilaterally] : posterior cervical [Cervical Lymph Nodes Enlarged Anterior Bilaterally] : anterior cervical [Supraclavicular Lymph Nodes Enlarged Bilaterally] : supraclavicular [Axillary Lymph Nodes Enlarged Bilaterally] : axillary [Normal] : oriented to person, place and time, the affect was normal, the mood was normal and not anxious [de-identified] : She is examined in both the upright and supine positions.    No palpable mass in either breast.

## 2024-09-15 NOTE — END OF VISIT
[Time Spent: ___ minutes] : I have spent [unfilled] minutes of time on the encounter which excludes teaching and separately reported services.
No adenopathy or splenomegaly. No cervical or inguinal lymphadenopathy.

## 2024-09-15 NOTE — LETTER CLOSING
[Consult Closing:] : Thank you for allowing me to participate in the care of this patient.  If you have any questions, please do not hesitate to contact me. [Sincerely yours,] : Sincerely yours, [FreeTextEntry3] : Katelynn Salazar M.D.   Electronically proofread and signed by:  Katelynn Salazar MD Attending, Department of Radiation Medicine [facility]

## 2024-09-15 NOTE — LETTER CLOSING
[Consult Closing:] : Thank you for allowing me to participate in the care of this patient.  If you have any questions, please do not hesitate to contact me. [Sincerely yours,] : Sincerely yours, [FreeTextEntry3] : Katelynn Salazar M.D.   Electronically proofread and signed by:  Katelynn Salazar MD Attending, Department of Radiation Medicine [facility]

## 2024-09-16 DIAGNOSIS — C50.811 MALIGNANT NEOPLASM OF OVERLAPPING SITES OF RIGHT FEMALE BREAST: ICD-10-CM

## 2024-09-18 ENCOUNTER — APPOINTMENT (OUTPATIENT)
Age: 64
End: 2024-09-18
Payer: MEDICARE

## 2024-09-18 ENCOUNTER — OUTPATIENT (OUTPATIENT)
Dept: OUTPATIENT SERVICES | Facility: HOSPITAL | Age: 64
LOS: 1 days | End: 2024-09-18
Payer: MEDICARE

## 2024-09-18 VITALS
WEIGHT: 148 LBS | RESPIRATION RATE: 16 BRPM | TEMPERATURE: 98 F | SYSTOLIC BLOOD PRESSURE: 114 MMHG | OXYGEN SATURATION: 100 % | DIASTOLIC BLOOD PRESSURE: 79 MMHG | HEART RATE: 85 BPM | BODY MASS INDEX: 29.06 KG/M2 | HEIGHT: 60 IN

## 2024-09-18 DIAGNOSIS — C50.811 MALIGNANT NEOPLASM OF OVERLAPPING SITES OF RIGHT FEMALE BREAST: ICD-10-CM

## 2024-09-18 DIAGNOSIS — Z98.890 OTHER SPECIFIED POSTPROCEDURAL STATES: Chronic | ICD-10-CM

## 2024-09-18 DIAGNOSIS — Z90.11 ACQUIRED ABSENCE OF RIGHT BREAST AND NIPPLE: Chronic | ICD-10-CM

## 2024-09-18 DIAGNOSIS — Z78.0 OTHER SPECIFIED DISORDERS OF BONE DENSITY AND STRUCTURE, UNSPECIFIED SITE: ICD-10-CM

## 2024-09-18 DIAGNOSIS — Z17.0 MALIGNANT NEOPLASM OF OVERLAPPING SITES OF RIGHT FEMALE BREAST: ICD-10-CM

## 2024-09-18 DIAGNOSIS — Z90.49 ACQUIRED ABSENCE OF OTHER SPECIFIED PARTS OF DIGESTIVE TRACT: Chronic | ICD-10-CM

## 2024-09-18 DIAGNOSIS — Z90.89 ACQUIRED ABSENCE OF OTHER ORGANS: Chronic | ICD-10-CM

## 2024-09-18 DIAGNOSIS — M85.80 OTHER SPECIFIED DISORDERS OF BONE DENSITY AND STRUCTURE, UNSPECIFIED SITE: ICD-10-CM

## 2024-09-18 DIAGNOSIS — D36.9 BENIGN NEOPLASM, UNSPECIFIED SITE: Chronic | ICD-10-CM

## 2024-09-18 DIAGNOSIS — Z51.81 ENCOUNTER FOR THERAPEUTIC DRUG LVL MONITORING: ICD-10-CM

## 2024-09-18 DIAGNOSIS — Z98.891 HISTORY OF UTERINE SCAR FROM PREVIOUS SURGERY: Chronic | ICD-10-CM

## 2024-09-18 DIAGNOSIS — Z79.811 ENCOUNTER FOR THERAPEUTIC DRUG LVL MONITORING: ICD-10-CM

## 2024-09-18 DIAGNOSIS — I89.0 LYMPHEDEMA, NOT ELSEWHERE CLASSIFIED: ICD-10-CM

## 2024-09-18 PROCEDURE — 99214 OFFICE O/P EST MOD 30 MIN: CPT

## 2024-09-18 PROCEDURE — G2211 COMPLEX E/M VISIT ADD ON: CPT

## 2024-09-18 NOTE — CONSULT LETTER
[Dear  ___] : Dear  [unfilled], [Courtesy Letter:] : I had the pleasure of seeing your patient, [unfilled], in my office today. [Please see my note below.] : Please see my note below. [Consult Closing:] : Thank you very much for allowing me to participate in the care of this patient.  If you have any questions, please do not hesitate to contact me. [Sincerely,] : Sincerely, [DrCarol  ___] : Dr. TYLER [FreeTextEntry3] : Alma Martinez MD [DrCarol ___] : Dr. TYLER

## 2024-09-18 NOTE — ASSESSMENT
[FreeTextEntry1] : 63 yo female has stage IIIA ([id number]) IDC of the right breast, G1, ER/CO positive, Her-2 negative, s/p simple right mastectomy, SLNB, implant reconstruction and left breast reduction. Oncotype RS is 20.  Assessment and Plan: -- Continue Exemestane daily. -- S/P right mastectomy and PMRT. There is no palpable abnormality. She had MRI b/l breast 6/2023 Postoperative changes of the right breast with regions of fat necrosis. No MRI evidence of malignancy. She had left breast dx mammogram and US 11/2023 and US in 5/2024. No suspicious finding. She will have left breast dx mammo in 11/2024. -- Osteopenia in femoral neck. Continue calcium and vitamin D supplement. Encourage regular physical activities. Referral provided.   -- Right arm lymphedema. Physical therapy and compression sleeve.  -- Reviewed labs from [facility]. -- Will give script for blood work in 6 months CBC, BMP, LFT, HbA1c, TSH, Free T4. -- Follow up with Dr Brambila as scheduled.  -- Followup with Dr. Washburn and Dr. Salazar. -- RTO for f/u in 6 months.

## 2024-09-18 NOTE — HISTORY OF PRESENT ILLNESS
[de-identified] : 63 yo female was referred by Dr. Rodriguez for consultation of breast cancer. She has h/o right breast intraductal papilloma, s/p R WLE on 1/22/2021. MRI breast on 12/17/21 showed large irregular nonmass enhancement in the right superior breast.  No right axillary adenopathy. MRI guided biopsy of 2 areas was recommended. Scattered foci of nonspecific enhancement in the left breast. Enlarged left axillary lymph nodes consistent with recent vaccine history. Low-lying left axillary lymph node also noted.\par  \par  \par  On 2/25/22, She had MRI guided biopsies x 2:\par  1. Breast, right lateral irregular non-mass like enhancement, MRI guided needle core biopsies:\par  - Invasive well differentiated mammary carcinoma with both tubular and lobular features (tubulo-lobular carcinoma), ER positive, 100%, NM positive 85%, Ki 67 3-5%, Her-2 negative.\par  - Ductal carcinoma in-situ (DCIS), micropapillary and solid types with comedo necrosis and microcalcifications, intermediate nuclear grade.\par  - Focal atypical lobular hyperplasia (ALH) and proliferative type fibrocystic changes associated with microcalcifications.\par  \par  2. Breast, right medial irregular non-mass like enhancement, MRI guided needle core biopsies:\par  - Invasive well differentiated mammary carcinoma with both tubular and lobular features (tubulo-lobular carcinoma) associated, ER pos 100%, NM pos 90%, Her-2 negative, Ki 67 7-10%.\par  - Ductal carcinoma in-situ (DCIS), micropapillary and solid types with comedo necrosis and microcalcifications, intermediate nuclear grade; extending to involve a sclerosing intraductal papilloma ("atypical papilloma").\par  - Focal atypical lobular hyperplasia (ALH) and proliferative type fibrocystic changes associated with microcalcifications.\par  \par  On 4/13/22, she underwent right breast skin sparing simple mastectomy, SLNB and left breast reduction. The pathology revealed 5.4 cm invasive well differentiated ductal, non-extensive ductal carcinoma in situ (DCIS), low to intermediate nuclear grade. Numerous foci of lymphovascular invasion are present. One SLN showed metastatic carcinoma with the largest contiguous focus measuring 5.5 mm. Metastatic tumor cells are present within lymphovascular spaces of the extracapsular soft tissue. One of two right non-sentinel axillary lymph nodes showed micrometastatic carcinoma measuring 0.26 mm. AJCC Eighth Edition Pathologic Stage: pT3, pN1a, pMx.\par  Oncotype RS is 20. \par  \par  She is recovering from surgery and there is small nonhealing area at incision site. She has been followed by Dr. Brambila. She is here with her daughter to discuss systemic adjuvant therapy.  [de-identified] : 08/31/2022 Imtiaz is here for follow up visit. She has stage IIIA ([id number]) IDC of the right breast, G1, ER/DE positive, Her-2 negative, s/p simple right mastectomy, SLNB, implant reconstruction and left breast reduction on 4/13/22. Oncotype RS is 20.  8/2/2022, having a target US of the right breast, ordered by Dr. Rodriguez, in which shows, probable developing scar/fat necrosis along the right breast mastectomy site. Sonographic follow-up is recommended in 3 months. She just completed PMRT to right chest and feels skin irritation. She has been taking Letrozole since 5/2022 and tolerated well. She noted hair thinning.  3/1/23: Imtiaz is here for follow up visit. She has stage IIIA ([id number]) IDC of the right breast, G1, ER/DE positive, Her-2 negative, s/p simple right mastectomy, SLNB, implant reconstruction and left breast reduction on 4/13/22. Oncotype RS is 20.  She received adjuvant PMRT completed in 8/2022. She started Letrozole in 5/2022 and switched to Exemestane in 12/2022 due to arthralgia. She had  left dx mammo and b/l US in 10/.2022 which showed Heterogeneous massed in the left and right breast. Short interval follow-up recommended. Bone density from 6/2022 was normal. She is feeling ok and no new complains.  9/13/23 Imtiaz is here for follow up visit. She has stage IIIA ([id number]) IDC of the right breast, G1, ER/DE positive, Her-2 negative, s/p simple right mastectomy, SLNB, implant reconstruction and left breast reduction on 4/13/22. Oncotype RS is 20.  She received adjuvant PMRT completed in 8/2022. She started Letrozole in 5/2022 and switched to Exemestane in 12/2022 due to arthralgia. She had  left dx mammo and b/l US in 10/.2022 which showed Heterogeneous massed in the left and right breast. Short interval follow-up recommended. She had MRI b/l breast 6/2023 Postoperative changes of the right breast with regions of fat necrosis. No MRI evidence of malignancy. She had Bone density in 6/2022 was normal. She is scheduled for right breast reconstruction revision excision of fat necrosis in 3 weeks. She also goes to PT for right extremity lymphedema.   3/7/24 Imtiaz is here for follow up visit. She has stage IIIA ([id number]) IDC of the right breast, G1, ER/DE positive, Her-2 negative, s/p simple right mastectomy, SLNB, implant reconstruction and left breast reduction on 4/13/22. Oncotype RS is 20.  She received adjuvant PMRT completed in 8/2022. She started Letrozole in 5/2022 and switched to Exemestane in 12/2022 due to arthralgia.  She had MRI b/l breast 6/2023 Postoperative changes of the right breast with regions of fat necrosis. No MRI evidence of malignancy. She had Bone density in 6/2022 was normal. She is s/p right breast reconstruction revision excision of fat necrosis in Nov 2023. She had left breast dx mammogram and US 11/2023 which showed Stable left breast mass as above. Short interval follow-up recommended. Recommendation: Follow-up breast ultrasound in 6 months. She also goes to PT for right extremity lymphedema. She reports cough for 1 week and took over count cough medicine but did not work.    9/18/24: Imtiaz is here for follow up visit. She has stage IIIA ([id number]) IDC of the right breast, G1, ER/DE positive, Her-2 negative, s/p simple right mastectomy, SLNB, implant reconstruction and left breast reduction on 4/13/22. Oncotype RS is 20.  She received adjuvant PMRT completed in 8/2022. She started Letrozole in 5/2022 and switched to Exemestane in 12/2022 due to arthralgia.  She had MRI b/l breast 6/2023 Postoperative changes of the right breast with regions of fat necrosis. No MRI evidence of malignancy. She had Bone density in 6/2022 was normal. She is s/p right breast reconstruction revision excision of fat necrosis in Nov 2023. She had left breast dx mammogram and US 11/2023 and left breast US in 5/2024. There was no suspicious finding. She has mild lymphedema in the right arm

## 2024-09-18 NOTE — PHYSICAL EXAM
[Fully active, able to carry on all pre-disease performance without restriction] : Status 0 - Fully active, able to carry on all pre-disease performance without restriction [Normal] : affect appropriate [de-identified] : S/P right breast mastectomy, SLNB and left breast reduction. S/P PMRT to right chest wall with skin reaction and erythema.

## 2024-09-19 DIAGNOSIS — C50.811 MALIGNANT NEOPLASM OF OVERLAPPING SITES OF RIGHT FEMALE BREAST: ICD-10-CM

## 2024-12-04 ENCOUNTER — RESULT REVIEW (OUTPATIENT)
Age: 64
End: 2024-12-04

## 2024-12-04 ENCOUNTER — OUTPATIENT (OUTPATIENT)
Dept: OUTPATIENT SERVICES | Facility: HOSPITAL | Age: 64
LOS: 1 days | End: 2024-12-04
Payer: MEDICARE

## 2024-12-04 DIAGNOSIS — Z90.49 ACQUIRED ABSENCE OF OTHER SPECIFIED PARTS OF DIGESTIVE TRACT: Chronic | ICD-10-CM

## 2024-12-04 DIAGNOSIS — Z98.891 HISTORY OF UTERINE SCAR FROM PREVIOUS SURGERY: Chronic | ICD-10-CM

## 2024-12-04 DIAGNOSIS — Z98.890 OTHER SPECIFIED POSTPROCEDURAL STATES: Chronic | ICD-10-CM

## 2024-12-04 DIAGNOSIS — R92.8 OTHER ABNORMAL AND INCONCLUSIVE FINDINGS ON DIAGNOSTIC IMAGING OF BREAST: ICD-10-CM

## 2024-12-04 DIAGNOSIS — D36.9 BENIGN NEOPLASM, UNSPECIFIED SITE: Chronic | ICD-10-CM

## 2024-12-04 DIAGNOSIS — Z90.89 ACQUIRED ABSENCE OF OTHER ORGANS: Chronic | ICD-10-CM

## 2024-12-04 DIAGNOSIS — Z90.11 ACQUIRED ABSENCE OF RIGHT BREAST AND NIPPLE: Chronic | ICD-10-CM

## 2024-12-04 PROCEDURE — 77065 DX MAMMO INCL CAD UNI: CPT | Mod: 26,LT

## 2024-12-04 PROCEDURE — G0279: CPT

## 2024-12-04 PROCEDURE — 76641 ULTRASOUND BREAST COMPLETE: CPT | Mod: 26,LT

## 2024-12-04 PROCEDURE — 76641 ULTRASOUND BREAST COMPLETE: CPT | Mod: LT

## 2024-12-04 PROCEDURE — G0279: CPT | Mod: 26

## 2024-12-04 PROCEDURE — 77065 DX MAMMO INCL CAD UNI: CPT | Mod: LT

## 2024-12-05 DIAGNOSIS — R92.8 OTHER ABNORMAL AND INCONCLUSIVE FINDINGS ON DIAGNOSTIC IMAGING OF BREAST: ICD-10-CM

## 2024-12-12 ENCOUNTER — APPOINTMENT (OUTPATIENT)
Dept: BREAST CENTER | Facility: CLINIC | Age: 64
End: 2024-12-12

## 2024-12-12 VITALS
BODY MASS INDEX: 29.06 KG/M2 | HEART RATE: 100 BPM | HEIGHT: 60 IN | SYSTOLIC BLOOD PRESSURE: 104 MMHG | DIASTOLIC BLOOD PRESSURE: 75 MMHG | WEIGHT: 148 LBS

## 2024-12-12 PROCEDURE — 99214 OFFICE O/P EST MOD 30 MIN: CPT

## 2024-12-23 PROBLEM — R92.30 DENSE BREAST TISSUE ON MAMMOGRAM: Status: ACTIVE | Noted: 2021-07-28

## 2025-03-10 ENCOUNTER — LABORATORY RESULT (OUTPATIENT)
Age: 65
End: 2025-03-10

## 2025-03-10 ENCOUNTER — OUTPATIENT (OUTPATIENT)
Dept: OUTPATIENT SERVICES | Facility: HOSPITAL | Age: 65
LOS: 1 days | End: 2025-03-10
Payer: MEDICARE

## 2025-03-10 DIAGNOSIS — Z98.890 OTHER SPECIFIED POSTPROCEDURAL STATES: Chronic | ICD-10-CM

## 2025-03-10 DIAGNOSIS — Z90.11 ACQUIRED ABSENCE OF RIGHT BREAST AND NIPPLE: Chronic | ICD-10-CM

## 2025-03-10 DIAGNOSIS — Z90.89 ACQUIRED ABSENCE OF OTHER ORGANS: Chronic | ICD-10-CM

## 2025-03-10 DIAGNOSIS — D36.9 BENIGN NEOPLASM, UNSPECIFIED SITE: Chronic | ICD-10-CM

## 2025-03-10 DIAGNOSIS — Z90.49 ACQUIRED ABSENCE OF OTHER SPECIFIED PARTS OF DIGESTIVE TRACT: Chronic | ICD-10-CM

## 2025-03-10 DIAGNOSIS — Z98.891 HISTORY OF UTERINE SCAR FROM PREVIOUS SURGERY: Chronic | ICD-10-CM

## 2025-03-10 PROCEDURE — 86300 IMMUNOASSAY TUMOR CA 15-3: CPT

## 2025-03-10 PROCEDURE — 85025 COMPLETE CBC W/AUTO DIFF WBC: CPT

## 2025-03-10 PROCEDURE — 80053 COMPREHEN METABOLIC PANEL: CPT

## 2025-03-10 PROCEDURE — 82378 CARCINOEMBRYONIC ANTIGEN: CPT

## 2025-03-13 DIAGNOSIS — C50.811 MALIGNANT NEOPLASM OF OVERLAPPING SITES OF RIGHT FEMALE BREAST: ICD-10-CM

## 2025-03-14 DIAGNOSIS — C50.811 MALIGNANT NEOPLASM OF OVERLAPPING SITES OF RIGHT FEMALE BREAST: ICD-10-CM

## 2025-03-19 ENCOUNTER — OUTPATIENT (OUTPATIENT)
Dept: OUTPATIENT SERVICES | Facility: HOSPITAL | Age: 65
LOS: 1 days | End: 2025-03-19
Payer: MEDICARE

## 2025-03-19 ENCOUNTER — APPOINTMENT (OUTPATIENT)
Age: 65
End: 2025-03-19
Payer: MEDICARE

## 2025-03-19 DIAGNOSIS — D36.9 BENIGN NEOPLASM, UNSPECIFIED SITE: Chronic | ICD-10-CM

## 2025-03-19 DIAGNOSIS — Z90.11 ACQUIRED ABSENCE OF RIGHT BREAST AND NIPPLE: Chronic | ICD-10-CM

## 2025-03-19 DIAGNOSIS — Z98.890 OTHER SPECIFIED POSTPROCEDURAL STATES: Chronic | ICD-10-CM

## 2025-03-19 DIAGNOSIS — Z90.89 ACQUIRED ABSENCE OF OTHER ORGANS: Chronic | ICD-10-CM

## 2025-03-19 DIAGNOSIS — Z79.811 ENCOUNTER FOR THERAPEUTIC DRUG LVL MONITORING: ICD-10-CM

## 2025-03-19 DIAGNOSIS — Z51.81 ENCOUNTER FOR THERAPEUTIC DRUG LVL MONITORING: ICD-10-CM

## 2025-03-19 DIAGNOSIS — C50.811 MALIGNANT NEOPLASM OF OVERLAPPING SITES OF RIGHT FEMALE BREAST: ICD-10-CM

## 2025-03-19 DIAGNOSIS — Z17.0 MALIGNANT NEOPLASM OF OVERLAPPING SITES OF RIGHT FEMALE BREAST: ICD-10-CM

## 2025-03-19 DIAGNOSIS — Z90.49 ACQUIRED ABSENCE OF OTHER SPECIFIED PARTS OF DIGESTIVE TRACT: Chronic | ICD-10-CM

## 2025-03-19 DIAGNOSIS — Z78.0 OTHER SPECIFIED DISORDERS OF BONE DENSITY AND STRUCTURE, UNSPECIFIED SITE: ICD-10-CM

## 2025-03-19 DIAGNOSIS — M85.80 OTHER SPECIFIED DISORDERS OF BONE DENSITY AND STRUCTURE, UNSPECIFIED SITE: ICD-10-CM

## 2025-03-19 DIAGNOSIS — Z98.891 HISTORY OF UTERINE SCAR FROM PREVIOUS SURGERY: Chronic | ICD-10-CM

## 2025-03-19 PROCEDURE — 99214 OFFICE O/P EST MOD 30 MIN: CPT

## 2025-03-20 DIAGNOSIS — C50.811 MALIGNANT NEOPLASM OF OVERLAPPING SITES OF RIGHT FEMALE BREAST: ICD-10-CM

## 2025-04-21 ENCOUNTER — APPOINTMENT (OUTPATIENT)
Dept: PLASTIC SURGERY | Facility: CLINIC | Age: 65
End: 2025-04-21
Payer: MEDICARE

## 2025-04-21 PROCEDURE — 99213 OFFICE O/P EST LOW 20 MIN: CPT

## 2025-06-13 ENCOUNTER — OUTPATIENT (OUTPATIENT)
Dept: OUTPATIENT SERVICES | Facility: HOSPITAL | Age: 65
LOS: 1 days | End: 2025-06-13
Payer: MEDICARE

## 2025-06-13 DIAGNOSIS — Z90.49 ACQUIRED ABSENCE OF OTHER SPECIFIED PARTS OF DIGESTIVE TRACT: Chronic | ICD-10-CM

## 2025-06-13 DIAGNOSIS — Z98.890 OTHER SPECIFIED POSTPROCEDURAL STATES: Chronic | ICD-10-CM

## 2025-06-13 DIAGNOSIS — Z90.11 ACQUIRED ABSENCE OF RIGHT BREAST AND NIPPLE: Chronic | ICD-10-CM

## 2025-06-13 DIAGNOSIS — Z98.891 HISTORY OF UTERINE SCAR FROM PREVIOUS SURGERY: Chronic | ICD-10-CM

## 2025-06-13 DIAGNOSIS — D36.9 BENIGN NEOPLASM, UNSPECIFIED SITE: Chronic | ICD-10-CM

## 2025-06-13 DIAGNOSIS — Z00.8 ENCOUNTER FOR OTHER GENERAL EXAMINATION: ICD-10-CM

## 2025-06-13 DIAGNOSIS — I89.0 LYMPHEDEMA, NOT ELSEWHERE CLASSIFIED: ICD-10-CM

## 2025-06-13 DIAGNOSIS — Z90.89 ACQUIRED ABSENCE OF OTHER ORGANS: Chronic | ICD-10-CM

## 2025-06-13 PROCEDURE — 97165 OT EVAL LOW COMPLEX 30 MIN: CPT | Mod: GO

## 2025-06-13 PROCEDURE — 97140 MANUAL THERAPY 1/> REGIONS: CPT | Mod: GO

## 2025-06-14 DIAGNOSIS — I89.0 LYMPHEDEMA, NOT ELSEWHERE CLASSIFIED: ICD-10-CM

## 2025-06-16 ENCOUNTER — OUTPATIENT (OUTPATIENT)
Dept: OUTPATIENT SERVICES | Facility: HOSPITAL | Age: 65
LOS: 1 days | End: 2025-06-16

## 2025-06-16 DIAGNOSIS — Z90.89 ACQUIRED ABSENCE OF OTHER ORGANS: Chronic | ICD-10-CM

## 2025-06-16 DIAGNOSIS — Z98.890 OTHER SPECIFIED POSTPROCEDURAL STATES: Chronic | ICD-10-CM

## 2025-06-16 DIAGNOSIS — Z90.49 ACQUIRED ABSENCE OF OTHER SPECIFIED PARTS OF DIGESTIVE TRACT: Chronic | ICD-10-CM

## 2025-06-16 DIAGNOSIS — D36.9 BENIGN NEOPLASM, UNSPECIFIED SITE: Chronic | ICD-10-CM

## 2025-06-16 DIAGNOSIS — Z90.11 ACQUIRED ABSENCE OF RIGHT BREAST AND NIPPLE: Chronic | ICD-10-CM

## 2025-06-16 DIAGNOSIS — I89.0 LYMPHEDEMA, NOT ELSEWHERE CLASSIFIED: ICD-10-CM

## 2025-06-16 DIAGNOSIS — Z98.891 HISTORY OF UTERINE SCAR FROM PREVIOUS SURGERY: Chronic | ICD-10-CM

## 2025-06-18 ENCOUNTER — OUTPATIENT (OUTPATIENT)
Dept: OUTPATIENT SERVICES | Facility: HOSPITAL | Age: 65
LOS: 1 days | End: 2025-06-18

## 2025-06-18 DIAGNOSIS — D36.9 BENIGN NEOPLASM, UNSPECIFIED SITE: Chronic | ICD-10-CM

## 2025-06-18 DIAGNOSIS — Z90.11 ACQUIRED ABSENCE OF RIGHT BREAST AND NIPPLE: Chronic | ICD-10-CM

## 2025-06-18 DIAGNOSIS — Z98.891 HISTORY OF UTERINE SCAR FROM PREVIOUS SURGERY: Chronic | ICD-10-CM

## 2025-06-18 DIAGNOSIS — Z90.89 ACQUIRED ABSENCE OF OTHER ORGANS: Chronic | ICD-10-CM

## 2025-06-18 DIAGNOSIS — I89.0 LYMPHEDEMA, NOT ELSEWHERE CLASSIFIED: ICD-10-CM

## 2025-06-18 DIAGNOSIS — Z90.49 ACQUIRED ABSENCE OF OTHER SPECIFIED PARTS OF DIGESTIVE TRACT: Chronic | ICD-10-CM

## 2025-06-18 DIAGNOSIS — Z98.890 OTHER SPECIFIED POSTPROCEDURAL STATES: Chronic | ICD-10-CM

## 2025-06-20 ENCOUNTER — OUTPATIENT (OUTPATIENT)
Dept: OUTPATIENT SERVICES | Facility: HOSPITAL | Age: 65
LOS: 1 days | End: 2025-06-20

## 2025-06-20 DIAGNOSIS — I89.0 LYMPHEDEMA, NOT ELSEWHERE CLASSIFIED: ICD-10-CM

## 2025-06-20 DIAGNOSIS — D36.9 BENIGN NEOPLASM, UNSPECIFIED SITE: Chronic | ICD-10-CM

## 2025-06-20 DIAGNOSIS — Z98.891 HISTORY OF UTERINE SCAR FROM PREVIOUS SURGERY: Chronic | ICD-10-CM

## 2025-06-20 DIAGNOSIS — Z98.890 OTHER SPECIFIED POSTPROCEDURAL STATES: Chronic | ICD-10-CM

## 2025-06-20 DIAGNOSIS — Z90.11 ACQUIRED ABSENCE OF RIGHT BREAST AND NIPPLE: Chronic | ICD-10-CM

## 2025-06-20 DIAGNOSIS — Z90.49 ACQUIRED ABSENCE OF OTHER SPECIFIED PARTS OF DIGESTIVE TRACT: Chronic | ICD-10-CM

## 2025-06-20 DIAGNOSIS — Z90.89 ACQUIRED ABSENCE OF OTHER ORGANS: Chronic | ICD-10-CM

## 2025-06-25 ENCOUNTER — RESULT REVIEW (OUTPATIENT)
Age: 65
End: 2025-06-25

## 2025-06-25 ENCOUNTER — OUTPATIENT (OUTPATIENT)
Dept: OUTPATIENT SERVICES | Facility: HOSPITAL | Age: 65
LOS: 1 days | End: 2025-06-25
Payer: MEDICARE

## 2025-06-25 DIAGNOSIS — D36.9 BENIGN NEOPLASM, UNSPECIFIED SITE: Chronic | ICD-10-CM

## 2025-06-25 DIAGNOSIS — Z98.891 HISTORY OF UTERINE SCAR FROM PREVIOUS SURGERY: Chronic | ICD-10-CM

## 2025-06-25 DIAGNOSIS — Z98.890 OTHER SPECIFIED POSTPROCEDURAL STATES: Chronic | ICD-10-CM

## 2025-06-25 DIAGNOSIS — R92.30 DENSE BREASTS, UNSPECIFIED: ICD-10-CM

## 2025-06-25 DIAGNOSIS — C50.811 MALIGNANT NEOPLASM OF OVERLAPPING SITES OF RIGHT FEMALE BREAST: ICD-10-CM

## 2025-06-25 DIAGNOSIS — Z90.89 ACQUIRED ABSENCE OF OTHER ORGANS: Chronic | ICD-10-CM

## 2025-06-25 DIAGNOSIS — Z90.11 ACQUIRED ABSENCE OF RIGHT BREAST AND NIPPLE: Chronic | ICD-10-CM

## 2025-06-25 DIAGNOSIS — Z90.49 ACQUIRED ABSENCE OF OTHER SPECIFIED PARTS OF DIGESTIVE TRACT: Chronic | ICD-10-CM

## 2025-06-25 PROCEDURE — A9579: CPT

## 2025-06-25 PROCEDURE — 77049 MRI BREAST C-+ W/CAD BI: CPT | Mod: 26

## 2025-06-25 PROCEDURE — 77049 MRI BREAST C-+ W/CAD BI: CPT

## 2025-06-25 PROCEDURE — C8937: CPT

## 2025-06-26 DIAGNOSIS — C50.811 MALIGNANT NEOPLASM OF OVERLAPPING SITES OF RIGHT FEMALE BREAST: ICD-10-CM

## 2025-06-26 DIAGNOSIS — R92.30 DENSE BREASTS, UNSPECIFIED: ICD-10-CM

## 2025-08-11 ENCOUNTER — APPOINTMENT (OUTPATIENT)
Dept: BREAST CENTER | Facility: CLINIC | Age: 65
End: 2025-08-11
Payer: MEDICARE

## 2025-08-11 VITALS
SYSTOLIC BLOOD PRESSURE: 108 MMHG | DIASTOLIC BLOOD PRESSURE: 80 MMHG | BODY MASS INDEX: 29.06 KG/M2 | HEART RATE: 83 BPM | WEIGHT: 148 LBS | HEIGHT: 60 IN

## 2025-08-11 DIAGNOSIS — Z17.0 MALIGNANT NEOPLASM OF OVERLAPPING SITES OF RIGHT FEMALE BREAST: ICD-10-CM

## 2025-08-11 DIAGNOSIS — Z90.11 ACQUIRED ABSENCE OF RIGHT BREAST AND NIPPLE: ICD-10-CM

## 2025-08-11 DIAGNOSIS — C50.811 MALIGNANT NEOPLASM OF OVERLAPPING SITES OF RIGHT FEMALE BREAST: ICD-10-CM

## 2025-08-11 DIAGNOSIS — I89.0 LYMPHEDEMA, NOT ELSEWHERE CLASSIFIED: ICD-10-CM

## 2025-08-11 PROCEDURE — 93702 BIS XTRACELL FLUID ANALYSIS: CPT | Mod: NC

## 2025-08-11 PROCEDURE — 99215 OFFICE O/P EST HI 40 MIN: CPT

## 2025-08-11 PROCEDURE — G2211 COMPLEX E/M VISIT ADD ON: CPT

## 2025-09-04 ENCOUNTER — APPOINTMENT (OUTPATIENT)
Age: 65
End: 2025-09-04

## 2025-09-15 ENCOUNTER — APPOINTMENT (OUTPATIENT)
Age: 65
End: 2025-09-15

## 2025-09-18 ENCOUNTER — APPOINTMENT (OUTPATIENT)
Dept: RADIATION ONCOLOGY | Facility: HOSPITAL | Age: 65
End: 2025-09-18
Payer: MEDICARE

## 2025-09-18 VITALS
TEMPERATURE: 98.2 F | RESPIRATION RATE: 16 BRPM | HEART RATE: 97 BPM | SYSTOLIC BLOOD PRESSURE: 87 MMHG | WEIGHT: 151.38 LBS | BODY MASS INDEX: 29.56 KG/M2 | OXYGEN SATURATION: 96 % | DIASTOLIC BLOOD PRESSURE: 60 MMHG

## 2025-09-18 DIAGNOSIS — C50.811 MALIGNANT NEOPLASM OF OVERLAPPING SITES OF RIGHT FEMALE BREAST: ICD-10-CM

## 2025-09-18 DIAGNOSIS — Z17.0 MALIGNANT NEOPLASM OF OVERLAPPING SITES OF RIGHT FEMALE BREAST: ICD-10-CM

## 2025-09-18 DIAGNOSIS — Z92.3 PERSONAL HISTORY OF IRRADIATION: ICD-10-CM

## 2025-09-18 PROCEDURE — G2211 COMPLEX E/M VISIT ADD ON: CPT

## 2025-09-18 PROCEDURE — 99213 OFFICE O/P EST LOW 20 MIN: CPT

## 2025-09-18 RX ORDER — TIRZEPATIDE 12.5 MG/.5ML
12.5 INJECTION, SOLUTION SUBCUTANEOUS
Qty: 2 | Refills: 0 | Status: ACTIVE | COMMUNITY
Start: 2025-09-13

## 2025-09-18 RX ORDER — RISANKIZUMAB-RZAA 150 MG/ML
150 INJECTION SUBCUTANEOUS
Refills: 0 | Status: ACTIVE | COMMUNITY